# Patient Record
Sex: FEMALE | Race: WHITE | NOT HISPANIC OR LATINO | Employment: UNEMPLOYED | URBAN - METROPOLITAN AREA
[De-identification: names, ages, dates, MRNs, and addresses within clinical notes are randomized per-mention and may not be internally consistent; named-entity substitution may affect disease eponyms.]

---

## 2017-03-13 RX ORDER — SIMVASTATIN 40 MG
TABLET ORAL
Qty: 90 TABLET | Refills: 2 | Status: SHIPPED | OUTPATIENT
Start: 2017-03-13 | End: 2017-10-16 | Stop reason: ALTCHOICE

## 2017-06-30 DIAGNOSIS — E78.5 HYPERLIPIDEMIA: ICD-10-CM

## 2017-07-05 ENCOUNTER — OFFICE VISIT (OUTPATIENT)
Dept: CARDIOLOGY | Facility: CLINIC | Age: 72
End: 2017-07-05

## 2017-07-05 VITALS
HEIGHT: 62 IN | HEART RATE: 80 BPM | SYSTOLIC BLOOD PRESSURE: 122 MMHG | BODY MASS INDEX: 24.18 KG/M2 | DIASTOLIC BLOOD PRESSURE: 56 MMHG | WEIGHT: 131.4 LBS

## 2017-07-05 DIAGNOSIS — I25.10 CORONARY ARTERY DISEASE INVOLVING NATIVE CORONARY ARTERY OF NATIVE HEART WITHOUT ANGINA PECTORIS: Primary | ICD-10-CM

## 2017-07-05 DIAGNOSIS — E78.5 DYSLIPIDEMIA: ICD-10-CM

## 2017-07-05 DIAGNOSIS — E78.00 HYPERCHOLESTEREMIA: Primary | ICD-10-CM

## 2017-07-05 PROCEDURE — 99213 OFFICE O/P EST LOW 20 MIN: CPT | Performed by: INTERNAL MEDICINE

## 2017-07-05 RX ORDER — ROSUVASTATIN CALCIUM 20 MG/1
20 TABLET, COATED ORAL DAILY
Qty: 90 TABLET | Refills: 0 | Status: SHIPPED | OUTPATIENT
Start: 2017-07-05 | End: 2017-10-15 | Stop reason: SDUPTHER

## 2017-07-05 RX ORDER — DEXLANSOPRAZOLE 60 MG/1
60 CAPSULE, DELAYED RELEASE ORAL DAILY
COMMUNITY
End: 2018-07-11

## 2017-07-05 NOTE — PROGRESS NOTES
Earline Santacruz  1945  [phone]      07/05/2017    John Randhawa MD    Chief Complaint   Patient presents with   • Coronary Artery Disease       PROBLEM LIST:  1. Coronary Artery Disease:  a. Emergency room presentation in [facility] for chest pain. Admitted (04/13/2015) through (04/14/2015) with normal EKG and normal troponin.  b. Cardiac catheterization, (04/21/2015), Jodie Kenyon MD, revealing significant coronary artery disease involving the second diagonal branch, which was a 0.5 mm branch in diameter and too small for intervention.   c. Mildly elevated LVEDP and evidence of diastolic dysfunction by echocardiogram.   d. Initiation of Imdur 30 mg daily as well as aspirin 81 mg daily.   2. Fatigue and muscle weakness, recent onset.  3. Family history of early cardiac death.   4. Dyslipidemia.   5. Type 2 Diabetes Mellitus.  6. GERD.   7. Hypothyroidism.   8. Chronic pain.  9. Panic attacks.   10. Pemphigoid.   11. Mild obstructive sleep apnea; does not utilize CPAP.  12. Surgical history:  a. Cataract surgery bilaterally.  b. TODD.   c. Right lumpectomy.  d. Left hand surgery.   e. Appendectomy.       Allergies   Allergen Reactions   • Crestor [Rosuvastatin]      Muscle Cramps   • Sulfa Antibiotics      Headaches       Current Medications:    Current Outpatient Prescriptions:   •  aspirin 81 MG EC tablet, Take 81 mg by mouth daily ., Disp: , Rfl:   •  coenzyme Q10 100 MG capsule, Take 100 mg by mouth daily ., Disp: , Rfl:   •  dexlansoprazole (DEXILANT) 60 MG capsule, Take 60 mg by mouth Daily., Disp: , Rfl:   •  folic acid (FOLVITE) 1 MG tablet, Take 1 mg by mouth daily . 2 po daily, Disp: , Rfl:   •  levothyroxine (SYNTHROID, LEVOTHROID) 50 MCG tablet, Take 50 mcg by mouth daily ., Disp: , Rfl:   •  losartan (COZAAR) 25 MG tablet, Take 25 mg by mouth daily . 1/2 po daily, Disp: , Rfl:   •  metFORMIN (GLUCOPHAGE) 1000 MG tablet, Take 2,000 mg by mouth daily ., Disp: , Rfl:   •   "PARoxetine (PAXIL) 20 MG tablet, Take 20 mg by mouth every morning ., Disp: , Rfl:   •  simvastatin (ZOCOR) 40 MG tablet, TAKE 1 TABLET EVERY DAY, Disp: 90 tablet, Rfl: 2    History of Present Illness (HPI):     Earline Santacruz returns today for a follow-up of CAD. Since last visit, the patient has been experiencing some 5-minute long chest pains at night, when she is resting. However, she does not experience chest pain when she exerts herself. She also mentions having a cough and white phlegm, only when she wakes up in the morning. Additionally, she has a slight pain in her neck, most likely from the cough or sinuses. Overall, the patient is doing well from a cardiac standpoint. She denies any complaints of pressure, tightness, or unusual dyspnea. Aside from moving things in her house, she has not been very physically active because she quickly becomes fatigued.    The following portions of the patient's history were reviewed and updated as appropriate: allergies, current medications and problem list.    Pertinent positives as listed in the HPI.  All other systems reviewed are negative.    Vitals:    17 1527   BP: 122/56   BP Location: Right arm   Patient Position: Sitting   Pulse: 80   Weight: 131 lb 6.4 oz (59.6 kg)   Height: 62\" (157.5 cm)       General: Alert, awake, and oriented.  Neck: Jugular venous pressure is within normal limits. Carotids have normal upstrokes without bruits.   Cardiovascular: Heart has a nondisplaced focal PMI. Regular rate and rhythm without murmur, gallop or rub.  Lungs: Clear without rales or wheezes. Equal expansion is noted.   Extremities: Show no edema.  Skin: Warm and dry.  Neurologic: Non-focal.    Diagnostic Data:  Procedures    Lipid panel (2017)  Tri  Chol: 255  HDL: 59   LDL: 168    Assessment:    ICD-10-CM ICD-9-CM   1. Coronary artery disease involving native coronary artery of native heart without angina pectoris I25.10 414.01   2. Dyslipidemia E78.5 " 272.4       Plan:  1. Crestor 20 mg by mouth daily.  The goal LDL is less than 70  2. Repatha if the Crestor is ineffective  3. Encouraged to increase physical activity.  4. Follow-up in 1 year or sooner, if needed.      Scribed for Jodie Kenyon MD by Nataliya De La Rosa. 7/5/2017  3:52 PM    I Jodie Kenyon MD personally performed the services described in this documentation as scribed by the above individual in my presence, and it is both accurate and complete.    Jodie Kenyon MD, FACC]

## 2017-09-13 DIAGNOSIS — E78.00 HYPERCHOLESTEREMIA: ICD-10-CM

## 2017-10-16 RX ORDER — ROSUVASTATIN CALCIUM 20 MG/1
TABLET, COATED ORAL
Qty: 90 TABLET | Refills: 3 | Status: SHIPPED | OUTPATIENT
Start: 2017-10-16 | End: 2018-11-13 | Stop reason: SDUPTHER

## 2018-07-11 ENCOUNTER — OFFICE VISIT (OUTPATIENT)
Dept: CARDIOLOGY | Facility: CLINIC | Age: 73
End: 2018-07-11

## 2018-07-11 VITALS
HEART RATE: 60 BPM | BODY MASS INDEX: 24.69 KG/M2 | WEIGHT: 134.2 LBS | HEIGHT: 62 IN | DIASTOLIC BLOOD PRESSURE: 62 MMHG | SYSTOLIC BLOOD PRESSURE: 104 MMHG

## 2018-07-11 DIAGNOSIS — E78.5 DYSLIPIDEMIA: ICD-10-CM

## 2018-07-11 DIAGNOSIS — R00.2 PALPITATIONS: ICD-10-CM

## 2018-07-11 DIAGNOSIS — I25.10 CORONARY ARTERY DISEASE INVOLVING NATIVE CORONARY ARTERY OF NATIVE HEART WITHOUT ANGINA PECTORIS: Primary | ICD-10-CM

## 2018-07-11 DIAGNOSIS — I63.429 CEREBROVASCULAR ACCIDENT (CVA) DUE TO EMBOLISM OF ANTERIOR CEREBRAL ARTERY, UNSPECIFIED BLOOD VESSEL LATERALITY (HCC): ICD-10-CM

## 2018-07-11 DIAGNOSIS — R09.89 BRUIT: ICD-10-CM

## 2018-07-11 PROCEDURE — 99213 OFFICE O/P EST LOW 20 MIN: CPT | Performed by: INTERNAL MEDICINE

## 2018-07-11 RX ORDER — PANTOPRAZOLE SODIUM 40 MG/1
40 TABLET, DELAYED RELEASE ORAL DAILY
COMMUNITY
End: 2022-10-31

## 2018-07-11 RX ORDER — ALPRAZOLAM 0.5 MG/1
0.5 TABLET ORAL NIGHTLY PRN
COMMUNITY
End: 2022-09-30 | Stop reason: SDUPTHER

## 2018-07-11 NOTE — PROGRESS NOTES
Earline Santacruz  1945  [phone]      07/11/2018    John Randhawa MD    Chief Complaint   Patient presents with   • Coronary Artery Disease       Problem List:  1. Coronary Artery Disease:  a. Emergency room presentation in [facility] for chest pain. Admitted (04/13/2015) through (04/14/2015) with normal EKG and normal troponin.  b. Cardiac catheterization, (04/21/2015), Jodie Kenyon MD, revealing significant coronary artery disease involving the second diagonal branch, which was a 0.5 mm branch in diameter and too small for intervention.   c. Mildly elevated LVEDP and evidence of diastolic dysfunction by echocardiogram.   d. Initiation of Imdur 30 mg daily as well as aspirin 81 mg daily.   2. Fatigue and muscle weakness, recent onset.  3. Family history of early cardiac death.   4. Dyslipidemia.   5. Type 2 Diabetes Mellitus.  6. GERD.   7. Hypothyroidism.   8. Chronic pain.  9. Panic attacks.   10. Pemphigoid.   11. Mild obstructive sleep apnea; does not utilize CPAP.  12. Surgical history:  a. Cataract surgery bilaterally.  b. TODD.   c. Right lumpectomy.  d. Left hand surgery.   e. Appendectomy.     Allergies   Allergen Reactions   • Crestor [Rosuvastatin]      Muscle Cramps   • Sulfa Antibiotics      Headaches       Current Medications:      Current Outpatient Prescriptions:   •  ALPRAZolam (XANAX) 0.5 MG tablet, Take 0.5 mg by mouth At Night As Needed for Anxiety., Disp: , Rfl:   •  aspirin 81 MG EC tablet, Take 81 mg by mouth daily ., Disp: , Rfl:   •  coenzyme Q10 100 MG capsule, Take 100 mg by mouth daily ., Disp: , Rfl:   •  folic acid (FOLVITE) 1 MG tablet, Take 1 mg by mouth daily . 2 po daily, Disp: , Rfl:   •  levothyroxine (SYNTHROID, LEVOTHROID) 50 MCG tablet, Take 50 mcg by mouth daily ., Disp: , Rfl:   •  losartan (COZAAR) 25 MG tablet, Take 25 mg by mouth Daily., Disp: , Rfl:   •  metFORMIN (GLUCOPHAGE) 1000 MG tablet, Take 2,000 mg by mouth daily ., Disp: , Rfl:   •   "pantoprazole (PROTONIX) 40 MG EC tablet, Take 40 mg by mouth Daily., Disp: , Rfl:   •  PARoxetine (PAXIL) 20 MG tablet, Take 20 mg by mouth every morning ., Disp: , Rfl:   •  rosuvastatin (CRESTOR) 20 MG tablet, TAKE 1 TABLET BY MOUTH EVERY DAY, Disp: 90 tablet, Rfl: 3    HPI    Earline Santacruz presents today for 12 month follow up of CAD and dyslipidemia. Since last visit, patient has been doing well from a cardiac standpoint, but was found to have had a stroke in the past. She states that it may have occurred 5 years ago after her mother passed away. On the day of the stroke she states she was at a yard sale, when she suddenly had difficulty with her speech. Reports that she has lópez under significant stress and anxiety due to family issues. States her ankles swell mildly at night. Patient denies chest pain, palpitations, shortness of breath, edema, PND, orthopnea, dizziness, and syncope. Remains busy and active with walking with no limitations.    The following portions of the patient's history were reviewed and updated as appropriate: allergies, current medications and problem list.    Pertinent positives as listed in the HPI.  All other systems reviewed are negative.    Vitals:    07/11/18 1507   BP: 104/62   BP Location: Right arm   Patient Position: Sitting   Pulse: 60   Weight: 60.9 kg (134 lb 3.2 oz)   Height: 157.5 cm (62\")       Physical Exam:  General: Alert and oriented to person, place, and time.  Neck: Jugular venous pressure is within normal limits. Carotids have normal upstrokes with Soft right bruit.   Cardiovascular: Regular rate and rhythm without murmur gallop or rub.  Lungs: Clear without rales or wheezes. Equal expansion is noted.   Extremities: Show no edema.  Skin: warm and dry.  Neurologic: nonfocal    Diagnostic Data:      Procedures    Assessment:      ICD-10-CM ICD-9-CM   1. Coronary artery disease involving native coronary artery of native heart without angina pectoris I25.10 414.01 "   2. Dyslipidemia E78.5 272.4   3. 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 Palpitations  R00.2 785.1       Plan:    1. Schedule carotid US and a 30 day monitor.  2. Continue current medications.  3. F/up in 5-6 weeksor sooner if needed.    Scribed for Jodie Kenyon MD by Santana Adam. 7/11/2018  3:38 PM     I Jodie Kenyon MD personally performed the services described in this documentation as scribed by the above individual in my presence, and it is both accurate and complete.    Jodie Kenyon MD, FACC

## 2018-08-14 ENCOUNTER — TELEPHONE (OUTPATIENT)
Dept: CARDIOLOGY | Facility: CLINIC | Age: 73
End: 2018-08-14

## 2018-08-14 NOTE — TELEPHONE ENCOUNTER
Notified pt via phone that her monitor did not show any afib- keep carotid and appt on 8/22- she verbalized understanding-

## 2018-08-22 ENCOUNTER — OFFICE VISIT (OUTPATIENT)
Dept: CARDIOLOGY | Facility: CLINIC | Age: 73
End: 2018-08-22

## 2018-08-22 ENCOUNTER — HOSPITAL ENCOUNTER (OUTPATIENT)
Dept: CARDIOLOGY | Facility: HOSPITAL | Age: 73
Discharge: HOME | End: 2018-08-22
Attending: INTERNAL MEDICINE | Admitting: INTERNAL MEDICINE

## 2018-08-22 VITALS
HEIGHT: 62 IN | BODY MASS INDEX: 23.81 KG/M2 | WEIGHT: 129.4 LBS | SYSTOLIC BLOOD PRESSURE: 114 MMHG | HEART RATE: 62 BPM | DIASTOLIC BLOOD PRESSURE: 54 MMHG

## 2018-08-22 DIAGNOSIS — R09.89 BRUIT: ICD-10-CM

## 2018-08-22 DIAGNOSIS — E78.5 DYSLIPIDEMIA: ICD-10-CM

## 2018-08-22 DIAGNOSIS — I25.10 CORONARY ARTERY DISEASE INVOLVING NATIVE CORONARY ARTERY OF NATIVE HEART WITHOUT ANGINA PECTORIS: Primary | ICD-10-CM

## 2018-08-22 LAB
BH CV XLRA MEAS LEFT CCA RATIO VEL: 110 CM/SEC
BH CV XLRA MEAS LEFT DIST CCA EDV: 21.6 CM/SEC
BH CV XLRA MEAS LEFT DIST CCA PSV: 111 CM/SEC
BH CV XLRA MEAS LEFT DIST ICA EDV: 29.1 CM/SEC
BH CV XLRA MEAS LEFT DIST ICA PSV: 102.1 CM/SEC
BH CV XLRA MEAS LEFT ICA RATIO VEL: 110 CM/SEC
BH CV XLRA MEAS LEFT ICA/CCA RATIO: 1
BH CV XLRA MEAS LEFT MID CCA EDV: 20.6 CM/SEC
BH CV XLRA MEAS LEFT MID CCA PSV: 106.1 CM/SEC
BH CV XLRA MEAS LEFT MID ICA EDV: 27.5 CM/SEC
BH CV XLRA MEAS LEFT MID ICA PSV: 110.8 CM/SEC
BH CV XLRA MEAS LEFT PROX CCA EDV: 23.6 CM/SEC
BH CV XLRA MEAS LEFT PROX CCA PSV: 117.9 CM/SEC
BH CV XLRA MEAS LEFT PROX ECA PSV: 90.4 CM/SEC
BH CV XLRA MEAS LEFT PROX ICA EDV: 29.9 CM/SEC
BH CV XLRA MEAS LEFT PROX ICA PSV: 104.5 CM/SEC
BH CV XLRA MEAS LEFT PROX SCLA PSV: 139.5 CM/SEC
BH CV XLRA MEAS LEFT VERTEBRAL A PSV: 47.9 CM/SEC
BH CV XLRA MEAS RIGHT CCA RATIO VEL: 83.1 CM/SEC
BH CV XLRA MEAS RIGHT DIST CCA EDV: 16.1 CM/SEC
BH CV XLRA MEAS RIGHT DIST CCA PSV: 83.8 CM/SEC
BH CV XLRA MEAS RIGHT DIST ICA EDV: 32.2 CM/SEC
BH CV XLRA MEAS RIGHT DIST ICA PSV: 106.9 CM/SEC
BH CV XLRA MEAS RIGHT ICA RATIO VEL: 98.5 CM/SEC
BH CV XLRA MEAS RIGHT ICA/CCA RATIO: 1.2
BH CV XLRA MEAS RIGHT MID CCA EDV: 17.5 CM/SEC
BH CV XLRA MEAS RIGHT MID CCA PSV: 93.6 CM/SEC
BH CV XLRA MEAS RIGHT MID ICA EDV: 30 CM/SEC
BH CV XLRA MEAS RIGHT MID ICA PSV: 99.2 CM/SEC
BH CV XLRA MEAS RIGHT PROX CCA EDV: 19.6 CM/SEC
BH CV XLRA MEAS RIGHT PROX CCA PSV: 87.4 CM/SEC
BH CV XLRA MEAS RIGHT PROX ECA PSV: 74.7 CM/SEC
BH CV XLRA MEAS RIGHT PROX ICA EDV: 21 CM/SEC
BH CV XLRA MEAS RIGHT PROX ICA PSV: 81.7 CM/SEC
BH CV XLRA MEAS RIGHT PROX SCLA PSV: 165 CM/SEC
BH CV XLRA MEAS RIGHT VERTEBRAL A PSV: 33.8 CM/SEC
LEFT ARM BP: NORMAL MMHG
RIGHT ARM BP: NORMAL MMHG

## 2018-08-22 PROCEDURE — 93880 EXTRACRANIAL BILAT STUDY: CPT

## 2018-08-22 PROCEDURE — 99213 OFFICE O/P EST LOW 20 MIN: CPT | Performed by: INTERNAL MEDICINE

## 2018-08-22 PROCEDURE — 93880 EXTRACRANIAL BILAT STUDY: CPT | Performed by: INTERNAL MEDICINE

## 2018-08-22 NOTE — PROGRESS NOTES
Earline Santacruz  1945  [phone]      08/22/2018    John Randhawa MD    Chief Complaint   Patient presents with   • Coronary Artery Disease       Problem List:  1. Coronary Artery Disease:  a. Emergency room presentation in [facility] for chest pain. Admitted (04/13/2015) through (04/14/2015) with normal EKG and normal troponin.  b. Cardiac catheterization, (04/21/2015), Jodie Kenyon MD, revealing significant coronary artery disease involving the second diagonal branch, which was a 0.5 mm branch in diameter and too small for intervention.   c. Mildly elevated LVEDP and evidence of diastolic dysfunction by echocardiogram.   d. Initiation of Imdur 30 mg daily as well as aspirin 81 mg daily.   2. Prior CVA  a. Holter monitor 8/9/2018: SR/ST/PAC  b. Caroid duplex 8/22/2018: Right internal carotid artery stenosis of 0-49%. Left internal carotid artery stenosis of 0-49%. Minimal carotid atheroma bilaterally.  3. Fatigue and muscle weakness, recent onset.  4. Family history of early cardiac death.   5. Dyslipidemia.   6. Type 2 Diabetes Mellitus.  7. GERD.   8. Hypothyroidism.   9. Chronic pain.  10. Panic attacks.   11. Pemphigoid.   12. Mild obstructive sleep apnea; does not utilize CPAP.  13. Surgical history:  a. Cataract surgery bilaterally.  b. TODD.   c. Right lumpectomy.  d. Left hand surgery.   e. Appendectomy.     Allergies   Allergen Reactions   • Crestor [Rosuvastatin]      Muscle Cramps   • Sulfa Antibiotics      Headaches       Current Medications:      Current Outpatient Prescriptions:   •  ALPRAZolam (XANAX) 0.5 MG tablet, Take 0.5 mg by mouth At Night As Needed for Anxiety., Disp: , Rfl:   •  aspirin 81 MG EC tablet, Take 81 mg by mouth daily ., Disp: , Rfl:   •  coenzyme Q10 100 MG capsule, Take 100 mg by mouth daily ., Disp: , Rfl:   •  folic acid (FOLVITE) 1 MG tablet, Take 1 mg by mouth daily . 2 po daily, Disp: , Rfl:   •  levothyroxine (SYNTHROID, LEVOTHROID) 50 MCG tablet,  "Take 50 mcg by mouth daily ., Disp: , Rfl:   •  losartan (COZAAR) 25 MG tablet, Take 25 mg by mouth Daily., Disp: , Rfl:   •  metFORMIN (GLUCOPHAGE) 500 MG tablet, Take 2,000 mg by mouth daily ., Disp: , Rfl:   •  pantoprazole (PROTONIX) 40 MG EC tablet, Take 40 mg by mouth Daily., Disp: , Rfl:   •  rosuvastatin (CRESTOR) 20 MG tablet, TAKE 1 TABLET BY MOUTH EVERY DAY, Disp: 90 tablet, Rfl: 3    HPI    Earline Santacruz presents today for 4 week follow up of CAD and dyslipidemia. Since last visit, patient has been doing well from a cardia standpoint. Per most recent carotid duplex, her carotid artery flow is normal, with minimal blockage. She had a recent 30 day heart monitor which showed sinus rhythm with no episodes of atrial fibrillation. She recently was bitten by her dog, but has had no complications. Patient denies chest pain, palpitations, shortness of breath, edema, PND, orthopnea, dizziness, and syncope. Remains busy and active with no limitations, but does not exercise regularly.    The following portions of the patient's history were reviewed and updated as appropriate: allergies, current medications and problem list.    Pertinent positives as listed in the HPI.  All other systems reviewed are negative.    Vitals:    08/22/18 1114   BP: 114/54   BP Location: Right arm   Patient Position: Sitting   Pulse: 62   Weight: 58.7 kg (129 lb 6.4 oz)   Height: 157.5 cm (62\")       Physical Exam:  General: Alert and oriented to person, place, and time.  Neck: Jugular venous pressure is within normal limits. Carotids have normal upstrokes without bruits.   Cardiovascular: Regular rate and rhythm without murmur gallop or rub.  Lungs: Clear without rales or wheezes. Equal expansion is noted.   Extremities: Show no edema.  Skin: warm and dry.  Neurologic: nonfocal    Diagnostic Data:    Lipid panel 5/16/2018:   TG 97    HDL 68  LDL 29    Procedures    Assessment:      ICD-10-CM ICD-9-CM   1. Coronary artery " disease involving native coronary artery of native heart without angina pectoris I25.10 414.01   2. Dyslipidemia E78.5 272.4       Plan:    1. No transesophageal echo at this time for her prior CVA.  There was no evidence of A. fib on her monitor.  She does not believe she was taking aspirin daily at the time of her CVA approximately 5 years ago.    2. Begin regular exercise for at least 30 minutes 4-5 days a week.  3. If CVA symptoms reoccur, then reevaluate the possibility of BERTIN..  4. Continue current medications.  5. F/up in 1 year or sooner if needed.    Scribed for Jodie Kenyon MD by Santana Adam. 8/22/2018  11:26 AM     I Jodie Kenyon MD personally performed the services described in this documentation as scribed by the above individual in my presence, and it is both accurate and complete.    Jodie Kenyon MD, [facility]

## 2018-11-13 RX ORDER — ROSUVASTATIN CALCIUM 20 MG/1
TABLET, COATED ORAL
Qty: 90 TABLET | Refills: 1 | Status: SHIPPED | OUTPATIENT
Start: 2018-11-13 | End: 2019-07-01 | Stop reason: SDUPTHER

## 2019-07-01 RX ORDER — ROSUVASTATIN CALCIUM 20 MG/1
20 TABLET, COATED ORAL DAILY
Qty: 90 TABLET | Refills: 0 | Status: SHIPPED | OUTPATIENT
Start: 2019-07-01 | End: 2023-01-09 | Stop reason: SDUPTHER

## 2019-10-23 ENCOUNTER — OFFICE VISIT (OUTPATIENT)
Dept: CARDIOLOGY | Facility: CLINIC | Age: 74
End: 2019-10-23

## 2019-10-23 VITALS
WEIGHT: 127 LBS | OXYGEN SATURATION: 97 % | BODY MASS INDEX: 23.37 KG/M2 | SYSTOLIC BLOOD PRESSURE: 106 MMHG | HEART RATE: 74 BPM | HEIGHT: 62 IN | DIASTOLIC BLOOD PRESSURE: 60 MMHG

## 2019-10-23 DIAGNOSIS — I25.10 CORONARY ARTERY DISEASE INVOLVING NATIVE CORONARY ARTERY OF NATIVE HEART WITHOUT ANGINA PECTORIS: Primary | ICD-10-CM

## 2019-10-23 DIAGNOSIS — I10 ESSENTIAL HYPERTENSION: ICD-10-CM

## 2019-10-23 DIAGNOSIS — E78.5 DYSLIPIDEMIA: ICD-10-CM

## 2019-10-23 PROCEDURE — 99214 OFFICE O/P EST MOD 30 MIN: CPT | Performed by: INTERNAL MEDICINE

## 2019-10-23 NOTE — PROGRESS NOTES
Earline Santacruz  1945  [phone]    Encounter date: 10/23/2019    PCP: John Randhawa MD    Chief Complaint: Coronary artery disease, dyslipidemia    PROBLEM LIST:  1. Coronary Artery Disease:  a. Emergency room presentation in [facility] for chest pain. Admitted (04/13/2015) through (04/14/2015) with normal EKG and normal troponin.  b. Cardiac catheterization, (04/21/2015), Jodie Kenyon MD, revealing significant coronary artery disease involving the second diagonal branch, which was a 0.5 mm branch in diameter and too small for intervention.   c. Mildly elevated LVEDP and evidence of diastolic dysfunction by echocardiogram.   d. Initiation of Imdur 30 mg daily as well as aspirin 81 mg daily.   2. Prior CVA  a. Holter monitor 8/9/2018: SR/ST/PAC  b. Caroid duplex 8/22/2018: Right internal carotid artery stenosis of 0-49%. Left internal carotid artery stenosis of 0-49%. Minimal carotid atheroma bilaterally.  3. Fatigue and muscle weakness, recent onset.  4. Family history of early cardiac death.   5. Dyslipidemia.   6. Type 2 Diabetes Mellitus.  7. GERD.   8. Hypothyroidism.   9. Chronic pain.  10. Panic attacks.   11. Pemphigoid.   12. Mild obstructive sleep apnea; does not utilize CPAP.  13. Surgical history:  a. Cataract surgery bilaterally.  b. TODD.   c. Right lumpectomy.  d. Left hand surgery.       Allergies   Allergen Reactions   • Crestor [Rosuvastatin]      Muscle Cramps   • Sulfa Antibiotics      Headaches       Current Medications:    Current Outpatient Medications:   •  ALPRAZolam (XANAX) 0.5 MG tablet, Take 0.5 mg by mouth At Night As Needed for Anxiety., Disp: , Rfl:   •  aspirin 81 MG EC tablet, Take 81 mg by mouth daily ., Disp: , Rfl:   •  coenzyme Q10 100 MG capsule, Take 100 mg by mouth daily ., Disp: , Rfl:   •  folic acid (FOLVITE) 1 MG tablet, Take 1 mg by mouth daily . 2 po daily, Disp: , Rfl:   •  levothyroxine (SYNTHROID, LEVOTHROID) 50 MCG tablet, Take 50 mcg by mouth  "daily ., Disp: , Rfl:   •  losartan (COZAAR) 25 MG tablet, Take 12.5 mg by mouth Daily., Disp: , Rfl:   •  metFORMIN (GLUCOPHAGE) 500 MG tablet, Take 2,000 mg by mouth daily ., Disp: , Rfl:   •  pantoprazole (PROTONIX) 40 MG EC tablet, Take 40 mg by mouth Daily., Disp: , Rfl:   •  rosuvastatin (CRESTOR) 20 MG tablet, Take 1 tablet by mouth Daily. Need lab work, Disp: 90 tablet, Rfl: 0    HPI  Patient returns today for follow up with a history of CAD and dyslipdemia. Since last visit in August 2019, she has been feeling well overall from a cardiac standpoint. She remains physically active with walking, though she admits that her physical activity has greatly decreased recently. She walked 4-5 miles daily in the Spring, but has recently only been walking about 2 miles weekly. This is due mostly to feeling fatigued and \"not wanting to do anything\". She reports blood pressures in the low-100s systolic and wonders whether this could cause her fatigue. Patient denies chest pain, palpitations, shortness of breath, edema, and syncope. Pt reports a recent flu shot.    The following portions of the patient's history were reviewed and updated as appropriate: allergies, current medications and problem list.    Pertinent positives as listed in the HPI.  All other systems reviewed are negative.    Vitals:    10/23/19 1119   BP: 106/60   BP Location: Right arm   Patient Position: Sitting   Pulse: 74   SpO2: 97%   Weight: 57.6 kg (127 lb)   Height: 156.2 cm (61.5\")       General: Alert, awake, and oriented  Neck: Jugular venous pressure is within normal limits. Carotids have normal upstrokes without bruits.   Cardiovascular: Heart has a nondisplaced focal PMI. Regular rate and rhythm without murmur, gallop or rub.  Lungs: Clear without rales or wheezes. Equal expansion is noted.   Extremities: Shows no edema.  Skin: Warm and dry.  Neurologic: nonfocal    Diagnostic Data:    Last labs, 05/28/2019:  · FLP: , TG 99, HDL 72, LDL " 75  · CMP: Glucose 152, otherwise normal CMP  · Normal CBC  · TSH 1.05    Procedures    Assessment:    ICD-10-CM ICD-9-CM   1. Coronary artery disease involving native coronary artery of native heart without angina pectoris I25.10 414.01   2. Essential hypertension I10 401.9   3. Dyslipidemia E78.5 272.4       Plan:    1. Begin routine aerobic exercise at least 30 minutes, 4-5 days per week.   2. DC losartan due to low-normal blood pressure with possible associated fatigue.  If her fatigue does not improve then she should reinitiate the losartan at 12.5 mg daily.  3. Continue aspirin 81 mg for CAD.  4. Continue rosuvastatin 20 mg for hyperlipidemia.  5. Continue current medications.  6. F/up in 12 months or sooner if needed.      Scribed for Jodie Kenyon MD by Yani Ramos. 10/23/2019  11:27 AM     I Jodie Kenyon MD personally performed the services described in this documentation as scribed by the above individual in my presence, and it is both accurate and complete.    Jodie Kenyon MD, FACC

## 2019-11-17 ENCOUNTER — HOSPITAL ENCOUNTER (OUTPATIENT)
Facility: HOSPITAL | Age: 74
Setting detail: OBSERVATION
Discharge: HOME | End: 2019-11-18
Attending: EMERGENCY MEDICINE | Admitting: INTERNAL MEDICINE

## 2019-11-17 DIAGNOSIS — R51.9 INTRACTABLE HEADACHE, UNSPECIFIED CHRONICITY PATTERN, UNSPECIFIED HEADACHE TYPE: Primary | ICD-10-CM

## 2019-11-17 DIAGNOSIS — Z74.09 IMPAIRED MOBILITY AND ADLS: ICD-10-CM

## 2019-11-17 DIAGNOSIS — Z78.9 IMPAIRED MOBILITY AND ADLS: ICD-10-CM

## 2019-11-17 PROBLEM — B02.9 ZOSTER: Status: ACTIVE | Noted: 2019-11-17

## 2019-11-17 LAB
ALBUMIN SERPL-MCNC: 4 G/DL (ref 3.5–5.2)
ALBUMIN/GLOB SERPL: 1.7 G/DL
ALP SERPL-CCNC: 51 U/L (ref 39–117)
ALT SERPL W P-5'-P-CCNC: 12 U/L (ref 1–33)
ANION GAP SERPL CALCULATED.3IONS-SCNC: 10 MMOL/L (ref 5–15)
AST SERPL-CCNC: 14 U/L (ref 1–32)
BASOPHILS # BLD AUTO: 0.05 10*3/MM3 (ref 0–0.2)
BASOPHILS NFR BLD AUTO: 0.5 % (ref 0–1.5)
BILIRUB SERPL-MCNC: 0.4 MG/DL (ref 0.2–1.2)
BUN BLD-MCNC: 15 MG/DL (ref 8–23)
BUN/CREAT SERPL: 23.4 (ref 7–25)
CALCIUM SPEC-SCNC: 9 MG/DL (ref 8.6–10.5)
CHLORIDE SERPL-SCNC: 101 MMOL/L (ref 98–107)
CO2 SERPL-SCNC: 29 MMOL/L (ref 22–29)
CREAT BLD-MCNC: 0.64 MG/DL (ref 0.57–1)
DEPRECATED RDW RBC AUTO: 42.2 FL (ref 37–54)
EOSINOPHIL # BLD AUTO: 0.21 10*3/MM3 (ref 0–0.4)
EOSINOPHIL NFR BLD AUTO: 2.1 % (ref 0.3–6.2)
ERYTHROCYTE [DISTWIDTH] IN BLOOD BY AUTOMATED COUNT: 12.3 % (ref 12.3–15.4)
ERYTHROCYTE [SEDIMENTATION RATE] IN BLOOD: 2 MM/HR (ref 0–30)
GFR SERPL CREATININE-BSD FRML MDRD: 91 ML/MIN/1.73
GLOBULIN UR ELPH-MCNC: 2.4 GM/DL
GLUCOSE BLD-MCNC: 155 MG/DL (ref 65–99)
HCT VFR BLD AUTO: 39.8 % (ref 34–46.6)
HGB BLD-MCNC: 13.6 G/DL (ref 12–15.9)
IMM GRANULOCYTES # BLD AUTO: 0.02 10*3/MM3 (ref 0–0.05)
IMM GRANULOCYTES NFR BLD AUTO: 0.2 % (ref 0–0.5)
LYMPHOCYTES # BLD AUTO: 2.87 10*3/MM3 (ref 0.7–3.1)
LYMPHOCYTES NFR BLD AUTO: 29 % (ref 19.6–45.3)
MCH RBC QN AUTO: 32.1 PG (ref 26.6–33)
MCHC RBC AUTO-ENTMCNC: 34.2 G/DL (ref 31.5–35.7)
MCV RBC AUTO: 93.9 FL (ref 79–97)
MONOCYTES # BLD AUTO: 0.64 10*3/MM3 (ref 0.1–0.9)
MONOCYTES NFR BLD AUTO: 6.5 % (ref 5–12)
NEUTROPHILS # BLD AUTO: 6.11 10*3/MM3 (ref 1.7–7)
NEUTROPHILS NFR BLD AUTO: 61.7 % (ref 42.7–76)
NRBC BLD AUTO-RTO: 0 /100 WBC (ref 0–0.2)
PLATELET # BLD AUTO: 294 10*3/MM3 (ref 140–450)
PMV BLD AUTO: 9.3 FL (ref 6–12)
POTASSIUM BLD-SCNC: 4.1 MMOL/L (ref 3.5–5.2)
PROT SERPL-MCNC: 6.4 G/DL (ref 6–8.5)
RBC # BLD AUTO: 4.24 10*6/MM3 (ref 3.77–5.28)
SODIUM BLD-SCNC: 140 MMOL/L (ref 136–145)
WBC NRBC COR # BLD: 9.9 10*3/MM3 (ref 3.4–10.8)

## 2019-11-17 PROCEDURE — 25010000002 DIPHENHYDRAMINE PER 50 MG: Performed by: PHYSICIAN ASSISTANT

## 2019-11-17 PROCEDURE — 96375 TX/PRO/DX INJ NEW DRUG ADDON: CPT

## 2019-11-17 PROCEDURE — 99285 EMERGENCY DEPT VISIT HI MDM: CPT

## 2019-11-17 PROCEDURE — 85652 RBC SED RATE AUTOMATED: CPT | Performed by: INTERNAL MEDICINE

## 2019-11-17 PROCEDURE — 85025 COMPLETE CBC W/AUTO DIFF WBC: CPT | Performed by: PHYSICIAN ASSISTANT

## 2019-11-17 PROCEDURE — 25010000002 KETOROLAC TROMETHAMINE PER 15 MG: Performed by: EMERGENCY MEDICINE

## 2019-11-17 PROCEDURE — 96376 TX/PRO/DX INJ SAME DRUG ADON: CPT

## 2019-11-17 PROCEDURE — 93005 ELECTROCARDIOGRAM TRACING: CPT | Performed by: INTERNAL MEDICINE

## 2019-11-17 PROCEDURE — G0378 HOSPITAL OBSERVATION PER HR: HCPCS

## 2019-11-17 PROCEDURE — 96374 THER/PROPH/DIAG INJ IV PUSH: CPT

## 2019-11-17 PROCEDURE — 80053 COMPREHEN METABOLIC PANEL: CPT | Performed by: PHYSICIAN ASSISTANT

## 2019-11-17 PROCEDURE — 25010000002 METOCLOPRAMIDE PER 10 MG: Performed by: EMERGENCY MEDICINE

## 2019-11-17 PROCEDURE — 25010000002 DIPHENHYDRAMINE PER 50 MG: Performed by: EMERGENCY MEDICINE

## 2019-11-17 PROCEDURE — 25010000002 METOCLOPRAMIDE PER 10 MG: Performed by: PHYSICIAN ASSISTANT

## 2019-11-17 PROCEDURE — 99220 PR INITIAL OBSERVATION CARE/DAY 70 MINUTES: CPT | Performed by: INTERNAL MEDICINE

## 2019-11-17 RX ORDER — ACETAMINOPHEN 325 MG/1
650 TABLET ORAL EVERY 4 HOURS PRN
Status: DISCONTINUED | OUTPATIENT
Start: 2019-11-17 | End: 2019-11-18 | Stop reason: HOSPADM

## 2019-11-17 RX ORDER — ONDANSETRON 2 MG/ML
4 INJECTION INTRAMUSCULAR; INTRAVENOUS EVERY 6 HOURS PRN
Status: DISCONTINUED | OUTPATIENT
Start: 2019-11-17 | End: 2019-11-18 | Stop reason: HOSPADM

## 2019-11-17 RX ORDER — SODIUM CHLORIDE 0.9 % (FLUSH) 0.9 %
10 SYRINGE (ML) INJECTION AS NEEDED
Status: DISCONTINUED | OUTPATIENT
Start: 2019-11-17 | End: 2019-11-18 | Stop reason: HOSPADM

## 2019-11-17 RX ORDER — METOCLOPRAMIDE HYDROCHLORIDE 5 MG/ML
10 INJECTION INTRAMUSCULAR; INTRAVENOUS ONCE
Status: COMPLETED | OUTPATIENT
Start: 2019-11-17 | End: 2019-11-17

## 2019-11-17 RX ORDER — HYDROMORPHONE HYDROCHLORIDE 1 MG/ML
0.25 INJECTION, SOLUTION INTRAMUSCULAR; INTRAVENOUS; SUBCUTANEOUS
Status: DISCONTINUED | OUTPATIENT
Start: 2019-11-17 | End: 2019-11-18 | Stop reason: HOSPADM

## 2019-11-17 RX ORDER — METOCLOPRAMIDE HYDROCHLORIDE 5 MG/ML
5 INJECTION INTRAMUSCULAR; INTRAVENOUS ONCE
Status: COMPLETED | OUTPATIENT
Start: 2019-11-17 | End: 2019-11-17

## 2019-11-17 RX ORDER — DIPHENHYDRAMINE HYDROCHLORIDE 50 MG/ML
12.5 INJECTION INTRAMUSCULAR; INTRAVENOUS ONCE
Status: COMPLETED | OUTPATIENT
Start: 2019-11-17 | End: 2019-11-17

## 2019-11-17 RX ORDER — NICOTINE POLACRILEX 4 MG
15 LOZENGE BUCCAL
Status: DISCONTINUED | OUTPATIENT
Start: 2019-11-17 | End: 2019-11-18 | Stop reason: HOSPADM

## 2019-11-17 RX ORDER — DIPHENHYDRAMINE HYDROCHLORIDE 50 MG/ML
25 INJECTION INTRAMUSCULAR; INTRAVENOUS ONCE
Status: COMPLETED | OUTPATIENT
Start: 2019-11-17 | End: 2019-11-17

## 2019-11-17 RX ORDER — PANTOPRAZOLE SODIUM 40 MG/1
40 TABLET, DELAYED RELEASE ORAL DAILY
Status: DISCONTINUED | OUTPATIENT
Start: 2019-11-18 | End: 2019-11-18 | Stop reason: HOSPADM

## 2019-11-17 RX ORDER — GABAPENTIN 400 MG/1
400 CAPSULE ORAL EVERY 8 HOURS SCHEDULED
Status: DISCONTINUED | OUTPATIENT
Start: 2019-11-18 | End: 2019-11-18 | Stop reason: HOSPADM

## 2019-11-17 RX ORDER — DEXTROSE MONOHYDRATE 25 G/50ML
25 INJECTION, SOLUTION INTRAVENOUS
Status: DISCONTINUED | OUTPATIENT
Start: 2019-11-17 | End: 2019-11-18 | Stop reason: HOSPADM

## 2019-11-17 RX ORDER — ROSUVASTATIN CALCIUM 20 MG/1
20 TABLET, COATED ORAL DAILY
Status: DISCONTINUED | OUTPATIENT
Start: 2019-11-18 | End: 2019-11-18 | Stop reason: HOSPADM

## 2019-11-17 RX ORDER — ASPIRIN 81 MG/1
81 TABLET ORAL DAILY
Status: DISCONTINUED | OUTPATIENT
Start: 2019-11-18 | End: 2019-11-18 | Stop reason: HOSPADM

## 2019-11-17 RX ORDER — ONDANSETRON 4 MG/1
4 TABLET, FILM COATED ORAL EVERY 6 HOURS PRN
Status: DISCONTINUED | OUTPATIENT
Start: 2019-11-17 | End: 2019-11-18 | Stop reason: HOSPADM

## 2019-11-17 RX ORDER — ALPRAZOLAM 0.5 MG/1
0.5 TABLET ORAL NIGHTLY PRN
Status: DISCONTINUED | OUTPATIENT
Start: 2019-11-17 | End: 2019-11-18 | Stop reason: HOSPADM

## 2019-11-17 RX ORDER — TETRACAINE HYDROCHLORIDE 5 MG/ML
2 SOLUTION OPHTHALMIC ONCE
Status: COMPLETED | OUTPATIENT
Start: 2019-11-17 | End: 2019-11-17

## 2019-11-17 RX ORDER — KETOROLAC TROMETHAMINE 15 MG/ML
15 INJECTION, SOLUTION INTRAMUSCULAR; INTRAVENOUS ONCE
Status: COMPLETED | OUTPATIENT
Start: 2019-11-17 | End: 2019-11-17

## 2019-11-17 RX ORDER — NALOXONE HCL 0.4 MG/ML
0.4 VIAL (ML) INJECTION
Status: DISCONTINUED | OUTPATIENT
Start: 2019-11-17 | End: 2019-11-18 | Stop reason: HOSPADM

## 2019-11-17 RX ORDER — ACETAMINOPHEN 160 MG/5ML
650 SOLUTION ORAL EVERY 4 HOURS PRN
Status: DISCONTINUED | OUTPATIENT
Start: 2019-11-17 | End: 2019-11-18 | Stop reason: HOSPADM

## 2019-11-17 RX ORDER — SODIUM CHLORIDE 0.9 % (FLUSH) 0.9 %
10 SYRINGE (ML) INJECTION EVERY 12 HOURS SCHEDULED
Status: DISCONTINUED | OUTPATIENT
Start: 2019-11-18 | End: 2019-11-18 | Stop reason: HOSPADM

## 2019-11-17 RX ORDER — ACETAMINOPHEN 650 MG/1
650 SUPPOSITORY RECTAL EVERY 4 HOURS PRN
Status: DISCONTINUED | OUTPATIENT
Start: 2019-11-17 | End: 2019-11-18 | Stop reason: HOSPADM

## 2019-11-17 RX ORDER — SODIUM CHLORIDE 9 MG/ML
75 INJECTION, SOLUTION INTRAVENOUS CONTINUOUS
Status: DISCONTINUED | OUTPATIENT
Start: 2019-11-18 | End: 2019-11-18 | Stop reason: HOSPADM

## 2019-11-17 RX ORDER — VALACYCLOVIR HYDROCHLORIDE 500 MG/1
1000 TABLET, FILM COATED ORAL EVERY 8 HOURS SCHEDULED
Status: DISCONTINUED | OUTPATIENT
Start: 2019-11-18 | End: 2019-11-18 | Stop reason: HOSPADM

## 2019-11-17 RX ORDER — LEVOTHYROXINE SODIUM 0.05 MG/1
50 TABLET ORAL
Status: DISCONTINUED | OUTPATIENT
Start: 2019-11-18 | End: 2019-11-18 | Stop reason: HOSPADM

## 2019-11-17 RX ADMIN — SODIUM CHLORIDE 500 ML: 9 INJECTION, SOLUTION INTRAVENOUS at 12:57

## 2019-11-17 RX ADMIN — METOCLOPRAMIDE 10 MG: 5 INJECTION, SOLUTION INTRAMUSCULAR; INTRAVENOUS at 17:26

## 2019-11-17 RX ADMIN — DIPHENHYDRAMINE HYDROCHLORIDE 25 MG: 50 INJECTION INTRAMUSCULAR; INTRAVENOUS at 17:10

## 2019-11-17 RX ADMIN — KETOROLAC TROMETHAMINE 15 MG: 15 INJECTION, SOLUTION INTRAMUSCULAR; INTRAVENOUS at 17:14

## 2019-11-17 RX ADMIN — TETRACAINE HYDROCHLORIDE 2 DROP: 5 SOLUTION OPHTHALMIC at 15:42

## 2019-11-17 RX ADMIN — METOCLOPRAMIDE 5 MG: 5 INJECTION, SOLUTION INTRAMUSCULAR; INTRAVENOUS at 12:57

## 2019-11-17 RX ADMIN — SODIUM CHLORIDE 1000 ML: 9 INJECTION, SOLUTION INTRAVENOUS at 17:08

## 2019-11-17 RX ADMIN — DIPHENHYDRAMINE HYDROCHLORIDE 12.5 MG: 50 INJECTION, SOLUTION INTRAMUSCULAR; INTRAVENOUS at 12:57

## 2019-11-17 NOTE — ED PROVIDER NOTES
"Subjective   Earline Santacruz is a 74 year old female presenting to the ED today with complaints of a headache. She reports for the past 2 weeks she has been experiencing a headache so she presented to [facility]. She was admitted to [facility] for 7 days and reports a temporal artery biopsy was performed and found to be normal. She was discharged from [facility] 2 days ago and prescribed Baclofen, Norco, Tylenol, and a muscle relaxer. She states the pain is constant, most prominent at her right eye, and describes it as throbbing and sharp. She states sleep helps relieve some of her pain, however light exacerbates it. She states having headaches in the past but never this severe. Additional complains include right eye drainage, intermittent ear \"popping and cracking\", nausea, and vomiting, however she denies fever. She has a history of pemphigoid autoimmune disease and diabetes mellitus. She has had a hysterectomy and a breast biopsy. She denies smoking, use of alcohol, or illegal drugs. She has upcoming appointments scheduled with Dr. Abarca, neurology, Dr. Valdez, dermatologist, Dr. Pruett, ENT, and Dr. Randhawa, primary care. There are no other acute complaints at this time.                History provided by:  Patient  Headache   Location: right eye.  Quality: sharp and throbbing.  Radiates to:  Does not radiate  Severity currently:  10/10  Onset quality:  Sudden  Duration:  2 weeks  Timing:  Constant  Progression:  Worsening  Chronicity:  New  Similar to prior headaches: no    Relieved by: mild relief from sleep.  Worsened by:  Light  Ineffective treatments:  Prescription medications  Associated symptoms: eye pain (right), nausea and vomiting    Associated symptoms: no fever        Review of Systems   Constitutional: Negative for chills and fever.   HENT:        Positive for intermittent ear \"popping and cracking\".   Eyes: Positive for pain (right) and discharge (right).   Gastrointestinal: Positive for " nausea and vomiting.   Neurological: Positive for headaches.   All other systems reviewed and are negative.      Past Medical History:   Diagnosis Date   • Diabetes mellitus (CMS/HCC)    • Hyperlipidemia    • Hypertension    • Panic attacks        Allergies   Allergen Reactions   • Sulfa Antibiotics      Headaches       Past Surgical History:   Procedure Laterality Date   • APPENDECTOMY     • BREAST LUMPECTOMY      RIGHT    • CATARACT EXTRACTION, BILATERAL     • HAND SURGERY      LEFT    • TOTAL ABDOMINAL HYSTERECTOMY         Family History   Problem Relation Age of Onset   • No Known Problems Mother    • No Known Problems Father        Social History     Socioeconomic History   • Marital status:      Spouse name: Not on file   • Number of children: Not on file   • Years of education: Not on file   • Highest education level: Not on file   Tobacco Use   • Smoking status: Never Smoker   • Smokeless tobacco: Never Used   Substance and Sexual Activity   • Alcohol use: No   • Drug use: No   • Sexual activity: Defer         Objective   Physical Exam   Constitutional: She is oriented to person, place, and time. She appears well-developed and well-nourished. No distress.   HENT:   Head: Normocephalic and atraumatic.   Right Ear: Tympanic membrane normal.   Left Ear: Tympanic membrane normal.   Eyes: Conjunctivae are normal. Pupils are equal, round, and reactive to light. No scleral icterus.   Abrasions over right eyelid. Normal fluorescein exam. IOP 10   Neck: Normal range of motion. Neck supple.   Cardiovascular: Normal rate, regular rhythm and normal heart sounds. Exam reveals no gallop and no friction rub.   No murmur heard.  Pulmonary/Chest: Effort normal and breath sounds normal. No respiratory distress.   Abdominal: Soft. There is no tenderness.   Musculoskeletal: Normal range of motion.   Neurological: She is alert and oriented to person, place, and time. No cranial nerve deficit or sensory deficit. She  exhibits normal muscle tone. Coordination normal.   Skin: Skin is warm and dry.   Psychiatric: She has a normal mood and affect. Her behavior is normal.   Nursing note and vitals reviewed.      Procedures         ED Course  ED Course as of Nov 17 2141   Sun Nov 17, 2019   [address] records:  Negative right temporal artery biopsy  Negative MRI brain/ orbits  Evaluated by neurology   [WT]      ED Course User Index  [WT] Sarah Mix, OSCAR     Recent Results (from the past 24 hour(s))   Comprehensive Metabolic Panel    Collection Time: 11/17/19 12:56 PM   Result Value Ref Range    Glucose 155 (H) 65 - 99 mg/dL    BUN 15 8 - 23 mg/dL    Creatinine 0.64 0.57 - 1.00 mg/dL    Sodium 140 136 - 145 mmol/L    Potassium 4.1 3.5 - 5.2 mmol/L    Chloride 101 98 - 107 mmol/L    CO2 29.0 22.0 - 29.0 mmol/L    Calcium 9.0 8.6 - 10.5 mg/dL    Total Protein 6.4 6.0 - 8.5 g/dL    Albumin 4.00 3.50 - 5.20 g/dL    ALT (SGPT) 12 1 - 33 U/L    AST (SGOT) 14 1 - 32 U/L    Alkaline Phosphatase 51 39 - 117 U/L    Total Bilirubin 0.4 0.2 - 1.2 mg/dL    eGFR Non African Amer 91 >60 mL/min/1.73    Globulin 2.4 gm/dL    A/G Ratio 1.7 g/dL    BUN/Creatinine Ratio 23.4 7.0 - 25.0    Anion Gap 10.0 5.0 - 15.0 mmol/L   CBC Auto Differential    Collection Time: 11/17/19 12:56 PM   Result Value Ref Range    WBC 9.90 3.40 - 10.80 10*3/mm3    RBC 4.24 3.77 - 5.28 10*6/mm3    Hemoglobin 13.6 12.0 - 15.9 g/dL    Hematocrit 39.8 34.0 - 46.6 %    MCV 93.9 79.0 - 97.0 fL    MCH 32.1 26.6 - 33.0 pg    MCHC 34.2 31.5 - 35.7 g/dL    RDW 12.3 12.3 - 15.4 %    RDW-SD 42.2 37.0 - 54.0 fl    MPV 9.3 6.0 - 12.0 fL    Platelets 294 140 - 450 10*3/mm3    Neutrophil % 61.7 42.7 - 76.0 %    Lymphocyte % 29.0 19.6 - 45.3 %    Monocyte % 6.5 5.0 - 12.0 %    Eosinophil % 2.1 0.3 - 6.2 %    Basophil % 0.5 0.0 - 1.5 %    Immature Grans % 0.2 0.0 - 0.5 %    Neutrophils, Absolute 6.11 1.70 - 7.00 10*3/mm3    Lymphocytes, Absolute 2.87 0.70 - 3.10 10*3/mm3    Monocytes,  "Absolute 0.64 0.10 - 0.90 10*3/mm3    Eosinophils, Absolute 0.21 0.00 - 0.40 10*3/mm3    Basophils, Absolute 0.05 0.00 - 0.20 10*3/mm3    Immature Grans, Absolute 0.02 0.00 - 0.05 10*3/mm3    nRBC 0.0 0.0 - 0.2 /100 WBC     Note: In addition to lab results from this visit, the labs listed above may include labs taken at another facility or during a different encounter within the last 24 hours. Please correlate lab times with ED admission and discharge times for further clarification of the services performed during this visit.    No orders to display     Vitals:    11/17/19 2000 11/17/19 2030 11/17/19 2109 11/17/19 2134   BP: 140/67 142/65 136/60 146/88   BP Location:   Right arm Right arm   Patient Position:   Lying Lying   Pulse:   60 68   Resp:   16 18   Temp:   98.4 °F (36.9 °C) 98.3 °F (36.8 °C)   TempSrc:   Oral Oral   SpO2: 97% 97% 96%    Weight:    58.4 kg (128 lb 12.8 oz)   Height:    154.9 cm (61\")     Medications   metoclopramide (REGLAN) injection 5 mg (5 mg Intravenous Given 11/17/19 1257)   diphenhydrAMINE (BENADRYL) injection 12.5 mg (12.5 mg Intravenous Given 11/17/19 1257)   sodium chloride 0.9 % bolus 500 mL (0 mL Intravenous Stopped 11/17/19 1327)   tetracaine (ALTACAINE) 0.5 % ophthalmic solution 2 drop (2 drops Right Eye Given 11/17/19 1542)   sodium chloride 0.9 % bolus 1,000 mL (0 mL Intravenous Stopped 11/17/19 1800)   metoclopramide (REGLAN) injection 10 mg (10 mg Intravenous Given 11/17/19 1726)   ketorolac (TORADOL) injection 15 mg (15 mg Intravenous Given 11/17/19 1714)   diphenhydrAMINE (BENADRYL) injection 25 mg (25 mg Intravenous Given 11/17/19 1710)     ECG/EMG Results (last 24 hours)     ** No results found for the last 24 hours. **        No orders to display                       MDM  Number of Diagnoses or Management Options  Intractable headache, unspecified chronicity pattern, unspecified headache type:   Diagnosis management comments: Patient presents complaining of pain in the " right temporal region.  She is recently been admitted for 1 week at [facility].  She had a negative MRI of the brain and orbits.  She had a negative temporal artery biopsy.  Patient continues to have intractable headache.  She is mildly improved in the emergency department with Reglan, Benadryl, Tylenol.  She does have lesions over her eyelid which she states she has been rubbing at, currently these appear to be only scabs and not vesicular or herpetic lesions.  She has no dendritic lesions on fluorescein exam.  She has a normal visual acuity.  She has seen neurology at the outside hospital and refused lumbar puncture.  She will be admitted for intractable headache.       Amount and/or Complexity of Data Reviewed  Clinical lab tests: reviewed        Final diagnoses:   Intractable headache, unspecified chronicity pattern, unspecified headache type       Documentation assistance provided by sydnie Parnell.  Information recorded by the sydnie was done at my direction and has been verified and validated by me.     Taya Parnell  11/17/19 1326       Taya Parnell  11/17/19 1642       Sarah Mix PA-C  11/17/19 [id number]

## 2019-11-18 VITALS
WEIGHT: 128.8 LBS | RESPIRATION RATE: 18 BRPM | SYSTOLIC BLOOD PRESSURE: 123 MMHG | BODY MASS INDEX: 24.32 KG/M2 | HEART RATE: 75 BPM | OXYGEN SATURATION: 95 % | HEIGHT: 61 IN | DIASTOLIC BLOOD PRESSURE: 66 MMHG | TEMPERATURE: 98.6 F

## 2019-11-18 LAB
ANION GAP SERPL CALCULATED.3IONS-SCNC: 13 MMOL/L (ref 5–15)
BASOPHILS # BLD AUTO: 0.05 10*3/MM3 (ref 0–0.2)
BASOPHILS NFR BLD AUTO: 0.5 % (ref 0–1.5)
BUN BLD-MCNC: 8 MG/DL (ref 8–23)
BUN/CREAT SERPL: 15.1 (ref 7–25)
CALCIUM SPEC-SCNC: 8.6 MG/DL (ref 8.6–10.5)
CHLORIDE SERPL-SCNC: 106 MMOL/L (ref 98–107)
CO2 SERPL-SCNC: 24 MMOL/L (ref 22–29)
CREAT BLD-MCNC: 0.53 MG/DL (ref 0.57–1)
DEPRECATED RDW RBC AUTO: 41.1 FL (ref 37–54)
EOSINOPHIL # BLD AUTO: 0.17 10*3/MM3 (ref 0–0.4)
EOSINOPHIL NFR BLD AUTO: 1.7 % (ref 0.3–6.2)
ERYTHROCYTE [DISTWIDTH] IN BLOOD BY AUTOMATED COUNT: 12.1 % (ref 12.3–15.4)
GFR SERPL CREATININE-BSD FRML MDRD: 113 ML/MIN/1.73
GLUCOSE BLD-MCNC: 128 MG/DL (ref 65–99)
GLUCOSE BLDC GLUCOMTR-MCNC: 102 MG/DL (ref 70–130)
GLUCOSE BLDC GLUCOMTR-MCNC: 152 MG/DL (ref 70–130)
HBA1C MFR BLD: 5.5 % (ref 4.8–5.6)
HCT VFR BLD AUTO: 38.6 % (ref 34–46.6)
HGB BLD-MCNC: 12.9 G/DL (ref 12–15.9)
IMM GRANULOCYTES # BLD AUTO: 0.03 10*3/MM3 (ref 0–0.05)
IMM GRANULOCYTES NFR BLD AUTO: 0.3 % (ref 0–0.5)
LYMPHOCYTES # BLD AUTO: 4.21 10*3/MM3 (ref 0.7–3.1)
LYMPHOCYTES NFR BLD AUTO: 41.6 % (ref 19.6–45.3)
MCH RBC QN AUTO: 31 PG (ref 26.6–33)
MCHC RBC AUTO-ENTMCNC: 33.4 G/DL (ref 31.5–35.7)
MCV RBC AUTO: 92.8 FL (ref 79–97)
MONOCYTES # BLD AUTO: 0.69 10*3/MM3 (ref 0.1–0.9)
MONOCYTES NFR BLD AUTO: 6.8 % (ref 5–12)
NEUTROPHILS # BLD AUTO: 4.97 10*3/MM3 (ref 1.7–7)
NEUTROPHILS NFR BLD AUTO: 49.1 % (ref 42.7–76)
NRBC BLD AUTO-RTO: 0 /100 WBC (ref 0–0.2)
PLATELET # BLD AUTO: 297 10*3/MM3 (ref 140–450)
PMV BLD AUTO: 9.4 FL (ref 6–12)
POTASSIUM BLD-SCNC: 3.6 MMOL/L (ref 3.5–5.2)
RBC # BLD AUTO: 4.16 10*6/MM3 (ref 3.77–5.28)
SODIUM BLD-SCNC: 143 MMOL/L (ref 136–145)
TSH SERPL DL<=0.05 MIU/L-ACNC: 1.2 UIU/ML (ref 0.27–4.2)
WBC NRBC COR # BLD: 10.12 10*3/MM3 (ref 3.4–10.8)

## 2019-11-18 PROCEDURE — 82962 GLUCOSE BLOOD TEST: CPT

## 2019-11-18 PROCEDURE — G0378 HOSPITAL OBSERVATION PER HR: HCPCS

## 2019-11-18 PROCEDURE — 25010000002 ENOXAPARIN PER 10 MG: Performed by: INTERNAL MEDICINE

## 2019-11-18 PROCEDURE — 96372 THER/PROPH/DIAG INJ SC/IM: CPT

## 2019-11-18 PROCEDURE — 83036 HEMOGLOBIN GLYCOSYLATED A1C: CPT | Performed by: INTERNAL MEDICINE

## 2019-11-18 PROCEDURE — 96361 HYDRATE IV INFUSION ADD-ON: CPT

## 2019-11-18 PROCEDURE — 97166 OT EVAL MOD COMPLEX 45 MIN: CPT | Performed by: OCCUPATIONAL THERAPIST

## 2019-11-18 PROCEDURE — 97161 PT EVAL LOW COMPLEX 20 MIN: CPT

## 2019-11-18 PROCEDURE — 63710000001 INSULIN LISPRO (HUMAN) PER 5 UNITS: Performed by: INTERNAL MEDICINE

## 2019-11-18 PROCEDURE — 97116 GAIT TRAINING THERAPY: CPT

## 2019-11-18 PROCEDURE — 85025 COMPLETE CBC W/AUTO DIFF WBC: CPT | Performed by: INTERNAL MEDICINE

## 2019-11-18 PROCEDURE — 93010 ELECTROCARDIOGRAM REPORT: CPT | Performed by: INTERNAL MEDICINE

## 2019-11-18 PROCEDURE — 84443 ASSAY THYROID STIM HORMONE: CPT | Performed by: INTERNAL MEDICINE

## 2019-11-18 PROCEDURE — 80048 BASIC METABOLIC PNL TOTAL CA: CPT | Performed by: INTERNAL MEDICINE

## 2019-11-18 PROCEDURE — 99217 PR OBSERVATION CARE DISCHARGE MANAGEMENT: CPT | Performed by: INTERNAL MEDICINE

## 2019-11-18 RX ORDER — VALACYCLOVIR HYDROCHLORIDE 1 G/1
1000 TABLET, FILM COATED ORAL 3 TIMES DAILY
Qty: 30 TABLET | Refills: 0 | Status: SHIPPED | OUTPATIENT
Start: 2019-11-18 | End: 2019-11-28

## 2019-11-18 RX ORDER — GABAPENTIN 400 MG/1
400 CAPSULE ORAL 3 TIMES DAILY
Qty: 21 CAPSULE | Refills: 0 | Status: SHIPPED | OUTPATIENT
Start: 2019-11-18 | End: 2022-10-31

## 2019-11-18 RX ADMIN — SODIUM CHLORIDE, PRESERVATIVE FREE 10 ML: 5 INJECTION INTRAVENOUS at 09:12

## 2019-11-18 RX ADMIN — VALACYCLOVIR HYDROCHLORIDE 1000 MG: 500 TABLET, FILM COATED ORAL at 00:56

## 2019-11-18 RX ADMIN — GABAPENTIN 400 MG: 400 CAPSULE ORAL at 00:56

## 2019-11-18 RX ADMIN — VALACYCLOVIR HYDROCHLORIDE 1000 MG: 500 TABLET, FILM COATED ORAL at 08:59

## 2019-11-18 RX ADMIN — ROSUVASTATIN CALCIUM 20 MG: 20 TABLET, COATED ORAL at 09:00

## 2019-11-18 RX ADMIN — ACETAMINOPHEN 650 MG: 325 TABLET ORAL at 09:48

## 2019-11-18 RX ADMIN — PANTOPRAZOLE SODIUM 40 MG: 40 TABLET, DELAYED RELEASE ORAL at 09:00

## 2019-11-18 RX ADMIN — ENOXAPARIN SODIUM 40 MG: 40 INJECTION SUBCUTANEOUS at 09:00

## 2019-11-18 RX ADMIN — SODIUM CHLORIDE 75 ML/HR: 9 INJECTION, SOLUTION INTRAVENOUS at 00:57

## 2019-11-18 RX ADMIN — SODIUM CHLORIDE, PRESERVATIVE FREE 10 ML: 5 INJECTION INTRAVENOUS at 00:57

## 2019-11-18 RX ADMIN — GABAPENTIN 400 MG: 400 CAPSULE ORAL at 06:13

## 2019-11-18 RX ADMIN — LEVOTHYROXINE SODIUM 50 MCG: 50 TABLET ORAL at 06:13

## 2019-11-18 RX ADMIN — ASPIRIN 81 MG: 81 TABLET, COATED ORAL at 09:00

## 2019-11-18 NOTE — CONSULTS
INFECTIOUS DISEASE CONSULT/INITIAL HOSPITAL VISIT    Earline Santacruz  1945  [id number]    Date of Consult: 11/18/2019    Admission Date: 11/17/2019      Requesting Provider: No ref. provider found  Evaluating Physician: Cabrera Hernandez MD    Reason for Consultation:right eye zoster ophthalmicus   History of present illness:    Patient is a 74 y.o. female seen today for right eye zoster ophthalmicus. She was recently hospitalized at [facility] for a persistent right sided headache, undergoing a temporal artery biopsy which was negative. She was discharged last Thursday, but she continued to have pain.  Over the weekend she began developing some lesions around her right eye, and presented to the ED.  She continues to complain of pain and blurred vision.  She has been afebrile without leukocytosis.  Plans to discharge for opthalmology evaluation as outpatient .     Past Medical History:   Diagnosis Date   • Diabetes mellitus (CMS/[facility])    • Hyperlipidemia    • Hypertension    • Panic attacks        Past Surgical History:   Procedure Laterality Date   • APPENDECTOMY     • BREAST LUMPECTOMY      RIGHT    • CATARACT EXTRACTION, BILATERAL     • HAND SURGERY      LEFT    • TOTAL ABDOMINAL HYSTERECTOMY         Family History   Problem Relation Age of Onset   • No Known Problems Mother    • No Known Problems Father        Social History     Socioeconomic History   • Marital status:      Spouse name: Not on file   • Number of children: Not on file   • Years of education: Not on file   • Highest education level: Not on file   Tobacco Use   • Smoking status: Never Smoker   • Smokeless tobacco: Never Used   Substance and Sexual Activity   • Alcohol use: No   • Drug use: No   • Sexual activity: Defer       Allergies   Allergen Reactions   • Sulfa Antibiotics      Headaches         Medication:    Current Facility-Administered Medications:   •  acetaminophen (TYLENOL) tablet 650 mg, 650 mg, Oral, Q4H PRN, 650 mg at  11/18/19 0948 **OR** acetaminophen (TYLENOL) 160 MG/5ML solution 650 mg, 650 mg, Oral, Q4H PRN **OR** acetaminophen (TYLENOL) suppository 650 mg, 650 mg, Rectal, Q4H PRN, Zeke, Yarely G, DO  •  ALPRAZolam (XANAX) tablet 0.5 mg, 0.5 mg, Oral, Nightly PRN, Zeke, Yarely G, DO  •  aspirin EC tablet 81 mg, 81 mg, Oral, Daily, Zeke, Yarely G, DO, 81 mg at 11/18/19 0900  •  dextrose (D50W) 25 g/ 50mL Intravenous Solution 25 g, 25 g, Intravenous, Q15 Min PRN, Zeke, Yarely G, DO  •  dextrose (GLUTOSE) oral gel 15 g, 15 g, Oral, Q15 Min PRN, Zeke, Yarely G, DO  •  enoxaparin (LOVENOX) syringe 40 mg, 40 mg, Subcutaneous, Q24H, ZekeRea lentzsie G, DO, 40 mg at 11/18/19 0900  •  gabapentin (NEURONTIN) capsule 400 mg, 400 mg, Oral, Q8H, Zeke, Yarely G, DO, 400 mg at 11/18/19 0613  •  glucagon (human recombinant) (GLUCAGEN DIAGNOSTIC) injection 1 mg, 1 mg, Subcutaneous, Q15 Min PRN, Zeke, Yarely G, DO  •  HYDROmorphone (DILAUDID) injection 0.25 mg, 0.25 mg, Intravenous, Q2H PRN **AND** naloxone (NARCAN) injection 0.4 mg, 0.4 mg, Intravenous, Q5 Min PRN, Zeke, Yarely G, DO  •  insulin lispro (humaLOG) injection 0-7 Units, 0-7 Units, Subcutaneous, 4x Daily With Meals & Nightly, Zeke, Yarely G, DO  •  levothyroxine (SYNTHROID, LEVOTHROID) tablet 50 mcg, 50 mcg, Oral, Q AM, Zeke, Yarely G, DO, 50 mcg at 11/18/19 0613  •  ondansetron (ZOFRAN) tablet 4 mg, 4 mg, Oral, Q6H PRN **OR** ondansetron (ZOFRAN) injection 4 mg, 4 mg, Intravenous, Q6H PRN, Zeke, Yarely G, DO  •  pantoprazole (PROTONIX) EC tablet 40 mg, 40 mg, Oral, Daily, Zeke, Yarely G, DO, 40 mg at 11/18/19 0900  •  rosuvastatin (CRESTOR) tablet 20 mg, 20 mg, Oral, Daily, Zeke, Yarely G, DO, 20 mg at 11/18/19 0900  •  sodium chloride 0.9 % flush 10 mL, 10 mL, Intravenous, Q12H, Zeke, Yarely G, DO, 10 mL at 11/18/19 0912  •  sodium chloride 0.9 % flush 10 mL, 10 mL, Intravenous, PRN, Zeke, Yarely G, DO  •  sodium chloride 0.9 % infusion, 75 mL/hr, Intravenous,  Continuous, Zeke, Yarely G, DO, Last Rate: 75 mL/hr at 19 0601, 75 mL/hr at 19 0601  •  valACYclovir (VALTREX) tablet 1,000 mg, 1,000 mg, Oral, Q8H, Zeke, Yarely G, DO, 1,000 mg at 19 0859    Antibiotics:  Anti-Infectives (From admission, onward)    Ordered     Dose/Rate Route Frequency Start Stop    19 2327  valACYclovir (VALTREX) tablet 1,000 mg     Ordering Provider:  Zeke, Yarely G, DO    1,000 mg Oral Every 8 Hours Scheduled 19 0000 19 2359    19 0956  valACYclovir (VALTREX) 1000 MG tablet     Ordering Provider:  Breann Bergman APRN    1,000 mg Oral 3 Times Daily 19 0000 19 2359            Review of Systems:  Constitutional-- No Fever, chills or sweats.  Appetite good, and no malaise. No fatigue.  HEENT-- + blurred vision , hearing or throat complaints.  No epistaxis or oral sores.  Denies odynophagia or dysphagia. No headache, photophobia or neck stiffness. + right sided headache  CV-- No chest pain, palpitation or syncope  Resp-- No SOB/cough/Hemoptysis  GI- No nausea, vomiting, or diarrhea.  No hematochezia, melena, or hematemesis. Denies jaundice or chronic liver disease.  -- No dysuria, hematuria, or flank pain.  Denies hesitancy, urgency or flank pain.  Lymph- no swollen lymph nodes in neck/axilla or groin.   Heme- No active bruising or bleeding; no Hx of DVT or PE.  MS-- no swelling or pain in the bones or joints of arms/legs.  No new back pain.  Neuro-- No acute focal weakness or numbness in the arms or legs.  No seizures.+ neuropathy         Physical Exam:   Vital Signs  Temp (24hrs), Av.7 °F (37.1 °C), Min:98.3 °F (36.8 °C), Max:99.1 °F (37.3 °C)    Temp  Min: 98.3 °F (36.8 °C)  Max: 99.1 °F (37.3 °C)  BP  Min: 123/66  Max: 153/66  Pulse  Min: 60  Max: 75  Resp  Min: 16  Max: 18  SpO2  Min: 93 %  Max: 100 %    GENERAL: Awake and alert, in no acute distress.   HEENT: Normocephalic, atraumatic.  PERRL. EOMI. Right eye with conjuctival  erythema, right side of eye with small scabbed lesions, and one on lid No icterus. Oropharynx clear without evidence of thrush or exudate. No evidence of peridontal disease.    HEART: RRR; No murmur, rubs, gallops.   LUNGS: Clear to auscultation bilaterally without wheezing, rales, rhonchi. Normal respiratory effort. Nonlabored. No dullness.  ABDOMEN: Soft, nontender, nondistended. Positive bowel sounds. No rebound or guarding. NO mass or HSM.  EXT:  No cyanosis, clubbing or edema. No cord.  :  Without Medrano catheter.  MSK:  No joint effusions  SKIN: She has no generalized rash.  She has several small scabbed lesions over the lateral right periorbital region and her eyelid.  NEURO: Oriented to PPT.  PSYCHIATRIC: Normal insight and judgement. Cooperative with PE    Laboratory Data    Results from last 7 days   Lab Units 11/18/19  0657 11/17/19  1256   WBC 10*3/mm3 10.12 9.90   HEMOGLOBIN g/dL 12.9 13.6   HEMATOCRIT % 38.6 39.8   PLATELETS 10*3/mm3 297 294     Results from last 7 days   Lab Units 11/18/19  0657   SODIUM mmol/L 143   POTASSIUM mmol/L 3.6   CHLORIDE mmol/L 106   CO2 mmol/L 24.0   BUN mg/dL 8   CREATININE mg/dL 0.53*   GLUCOSE mg/dL 128*   CALCIUM mg/dL 8.6     Results from last 7 days   Lab Units 11/17/19  1256   ALK PHOS U/L 51   BILIRUBIN mg/dL 0.4   ALT (SGPT) U/L 12   AST (SGOT) U/L 14     Results from last 7 days   Lab Units 11/17/19  1256   SED RATE mm/hr 2                     Estimated Creatinine Clearance: 51.4 mL/min (A) (by C-G formula based on SCr of 0.53 mg/dL (L)).      Microbiology:                                Radiology:  Imaging Results (Last 72 Hours)     ** No results found for the last 72 hours. **            Impression:   1.  Right eye zoster ophthalmicus- this presented as persistent pain with late onset of ulcerative lesions.  This was a difficult diagnosis to make while she was at [facility].  I did discuss her clinical situation with her physicians at [facility], both Dr. Vincent and Dr. Barakat.  I will plan for her to receive high-dose oral Valtrex.  She is complaining of some right eye visual disturbance and she will need to be seen by ophthalmology.  2.  Diabetes Mellitus, type 2      PLAN/RECOMMENDATIONS:   Thank you for asking us to see Earline Santacruz, I recommend the followin. Ophthalmology evaluation   2.  Valtrex 1g po tid   3.  Follow up with Dr. Hernandez Friday at 1230  appt made    Dr. Hernandez has obtained the history, performed the physical exam and formulated the above treatment plan.     I discussed her clinical situation with Dr. Pascal.  I discussed her situation with both Dr. Vincent and Dr. Barakat today.    Cabrera Hernandez MD  2019  12:55 PM

## 2019-11-18 NOTE — THERAPY EVALUATION
Acute Care - Occupational Therapy Initial Evaluation  [facility]     Patient Name: Earline Santacruz  : 1945  MRN: [id number]  Today's Date: 2019     Date of Referral to OT: 19       Admit Date: 2019       ICD-10-CM ICD-9-CM   1. Intractable headache, unspecified chronicity pattern, unspecified headache type R51 784.0   2. Impaired mobility and ADLs Z74.09 799.89     Patient Active Problem List   Diagnosis   • CAD (coronary artery disease)   • Dyslipidemia   • Type 2 diabetes mellitus (CMS/HCC)   • Hypothyroidism   • Chronic pain   • Pemphigoid   • Obstructive sleep apnea   • GERD (gastroesophageal reflux disease)   • Headache   • Zoster     Past Medical History:   Diagnosis Date   • Diabetes mellitus (CMS/HCC)    • Hyperlipidemia    • Hypertension    • Panic attacks      Past Surgical History:   Procedure Laterality Date   • APPENDECTOMY     • BREAST LUMPECTOMY      RIGHT    • CATARACT EXTRACTION, BILATERAL     • HAND SURGERY      LEFT    • TOTAL ABDOMINAL HYSTERECTOMY            OT ASSESSMENT FLOWSHEET (last 12 hours)      Occupational Therapy Evaluation     Row Name 19 0720                   OT Evaluation Time/Intention    Subjective Information  complains of;pain  -ST        Document Type  evaluation  -ST        Mode of Treatment  individual therapy;occupational therapy  -ST        Patient Effort  good  -ST        Symptoms Noted During/After Treatment  none  -ST           General Information    Patient Profile Reviewed?  yes  -ST        Patient Observations  alert;cooperative;agree to therapy  -ST        Patient/Family Observations  no family present  -ST        General Observations of Patient  Pt sitting UIB at arrival; IV intact  -ST        Prior Level of Function  independent:;all household mobility;community mobility;transfer;bed mobility;feeding;grooming;dressing;bathing;home management;cooking;cleaning;driving;shopping;using stairs prior to ~2-3 wks ago, then progressive  weakness  -ST        Pertinent History of Current Functional Problem  Pt presents to [facility] w/ c/o H/A and frontal eye pain around 10 days ago. Underwent R temporal artery bx and MRI, both (-) and d/c'ed home. Pt continued to have severe burning and sharp, stabbing R eye and frontal pain/skin sensitivity along with photophobia along with N/V and brought to [facility] for further examination. Work-up reveals probable shingles of eye, will attempt to transfer to  eye care  -ST        Existing Precautions/Restrictions  fall;other (see comments) vision; shingles precautions  -ST        Limitations/Impairments  visual  -ST        Risks Reviewed  patient:;LOB;nausea/vomiting;dizziness;increased discomfort;change in vital signs  -ST        Benefits Reviewed  patient:;improve function;increase independence;increase strength;increase balance;decrease pain;increase knowledge  -ST        Barriers to Rehab  medically complex  -ST           Relationship/Environment    Primary Source of Support/Comfort  spouse  -ST        Concerns About Impact on Relationships  pt lives w/her ex- (she will stay on 1st floor and him on 2nd floor)  -ST           Resource/Environmental Concerns    Current Living Arrangements  home/apartment/condo  -ST           Home Main Entrance    Number of Stairs, Main Entrance  four  -ST           Cognitive Assessment/Interventions    Additional Documentation  Cognitive Assessment/Intervention (Group)  -ST           Cognitive Assessment/Intervention- PT/OT    Affect/Mental Status (Cognitive)  WNL  -ST        Orientation Status (Cognition)  oriented x 4  -ST        Follows Commands (Cognition)  WNL  -ST        Cognitive Function (Cognitive)  WNL  -ST           Safety Issues, Functional Mobility    Safety Issues Affecting Function (Mobility)  judgment  -ST        Impairments Affecting Function (Mobility)  balance  -ST        Comment, Safety Issues/Impairments (Mobility)  judgement and balance d/t visual  deficits   -ST           Bed Mobility Assessment/Treatment    Bed Mobility Assessment/Treatment  supine-sit;sit-supine  -ST        Supine-Sit [place] (Bed Mobility)  minimum assist (75% patient effort)  -ST        Sit-Supine [place] (Bed Mobility)  supervision  -ST        Bed Mobility, Safety Issues  impaired trunk control for bed mobility  -ST        Assistive Device (Bed Mobility)  head of bed elevated  -ST        Comment (Bed Mobility)  cuing for sequencing   -ST           Functional Mobility    Functional Mobility- Ind. Level  contact guard assist  -ST        Functional Mobility-Distance (Feet)  15  -ST        Functional Mobility- Comment  HHA for decreased balance, slight posterior lean and mild LOB laterally Xseveral instances   -ST           Transfer Assessment/Treatment    Transfer Assessment/Treatment  sit-stand transfer;stand-sit transfer;toilet transfer  -ST           Sit-Stand Transfer    Sit-Stand [place] (Transfers)  supervision  -ST           Stand-Sit Transfer    Stand-Sit [place] (Transfers)  supervision  -ST           Toilet Transfer    Type (Toilet Transfer)  sit-stand;stand-sit  -ST        [place] Level (Toilet Transfer)  contact guard  -ST        Assistive Device (Toilet Transfer)  raised toilet seat  -ST           ADL Assessment/Intervention    BADL Assessment/Intervention  lower body dressing;grooming;toileting  -ST           Lower Body Dressing Assessment/Training    Lower Body Dressing [place] Level  don;socks  -ST        Lower Body Dressing Position  long sitting  -ST           Grooming Assessment/Training    [place] Level (Grooming)  wash face, hands;set up  -ST        Grooming Position  sink side  -ST        Comment (Grooming)  leaning against sink for additional support   -ST           Toileting Assessment/Training    [place] Level (Toileting)  perform perineal hygiene;set up  -ST        Assistive Devices (Toileting)  raised toilet seat  -ST            BADL Safety/Performance    Impairments, BADL Safety/Performance  balance;endurance/activity tolerance  -ST           General ROM    GENERAL ROM COMMENTS  BUCLARY's WFL for ADLs   -ST           MMT (Manual Muscle Testing)    General MMT Comments  4+/5  -ST           Motor Assessment/Interventions    Additional Documentation  Balance (Group)  -ST           Balance    Balance  dynamic sitting balance;dynamic standing balance  -ST           Dynamic Sitting Balance    Level of [place], Reaches Outside Midline (Sitting, Dynamic Balance)  supervision  -ST        Sitting Position, Reaches Outside Midline (Sitting, Dynamic Balance)  sitting on edge of bed  -ST           Dynamic Standing Balance    Level of [place], Reaches Outside Midline (Standing, Dynamic Balance)  contact guard assist  -ST        Time Able to Maintain Position, Reaches Outside Midline (Standing, Dynamic Balance)  1 to 2 minutes  -ST           Sensory Assessment/Intervention    Sensory General Assessment  no sensation deficits identified  -ST        Additional Documentation  Vision Assessment/Intervention (Group)  -ST           Vision Assessment/Intervention    Visual Impairment/Limitations  blurry vision;distance vision impaired  -ST        Visual Field Deficit  other (see comments)  -ST        Impact of Vision Impairment on Function (Vision)  R eye impaired and impacting perception, reading and depth  -ST           Positioning and Restraints    Pre-Treatment Position  in bed  -ST        Post Treatment Position  bed  -ST        In Bed  notified nsg;supine;call light within reach;encouraged to call for assist;exit alarm on  -ST           Pain Assessment    Additional Documentation  Pain Scale: Numbers Pre/Post-Treatment (Group)  -ST           Pain Scale: Numbers Pre/Post-Treatment    Pain Scale: Numbers, Pretreatment  8/10  -ST        Pain Scale: Numbers, Post-Treatment  8/10  -ST        Pain Location - Side  Right  -ST        Pain Location  eye   -ST           Clinical Impression (OT)    Date of Referral to OT  11/18/19  -ST        OT Diagnosis  impaired ADLs  -ST        Prognosis (OT Eval)  good  -ST        Patient/Family Goals Statement (OT Eval)  decrease pain/return to PLOF  -ST        Criteria for Skilled Therapeutic Interventions Met (OT Eval)  yes  -ST        Rehab Potential (OT Eval)  good, to achieve stated therapy goals  -ST        Therapy Frequency (OT Eval)  daily  -ST        Care Plan Review (OT)  evaluation/treatment results reviewed;care plan/treatment goals reviewed;risks/benefits reviewed;current/potential barriers reviewed;patient/other agree to care plan  -ST        Anticipated Equipment Needs at Discharge (OT)  --  (Significant)  w/appropriate AD to be determined by PT  -ST        Anticipated Discharge Disposition (OT)  home with home health  -ST           Planned OT Interventions    Planned Therapy Interventions (OT Eval)  activity tolerance training;functional balance retraining;occupation/activity based interventions;patient/caregiver education/training;transfer/mobility retraining  -ST           OT Goals    Bed Mobility Goal Selection (OT)  bed mobility, OT goal 1  -ST        Transfer Goal Selection (OT)  transfer, OT goal 1  -ST        Balance Goal Selection (OT)  balance, OT goal 1  -ST        Problem Specific Goal Selection (OT)  problem specific goal 1, OT  -ST        Additional Documentation  Balance Goal Selection (OT) (Row);Problem Specific Goal Selection (OT) (Row)  -ST           Bed Mobility Goal 1 (OT)    Activity/Assistive Device (Bed Mobility Goal 1, OT)  sit to supine/supine to sit  -ST        [place] Level/Cues Needed (Bed Mobility Goal 1, OT)  independent  -ST        Time Frame (Bed Mobility Goal 1, OT)  long term goal (LTG);5 days  -ST        Progress/Outcomes (Bed Mobility Goal 1, OT)  goal ongoing  -ST           Transfer Goal 1 (OT)    Activity/Assistive Device (Transfer Goal 1, OT)   bed-to-chair/chair-to-bed;toilet;other (see comments)  -ST        [place] Level/Cues Needed (Transfer Goal 1, OT)  conditional independence  -ST        Time Frame (Transfer Goal 1, OT)  long term goal (LTG);5 days  -ST        Barriers (Transfers Goal 1, OT)  appropriate AD  -ST        Progress/Outcome (Transfer Goal 1, OT)  goal ongoing  -ST           Balance Goal 1 (OT)    Activity/Assistive Device (Balance Goal 1, OT)  standing, dynamic  -ST        [place] Level/Cues Needed (Balance Goal 1, OT)  conditional independence  -ST        Time Frame (Balance Goal 1, OT)  long term goal (LTG);5 days  -ST        Barriers (Balance Goal 1, OT)  complete fxnl task w/o LOB w/appropriate AD X5+ mins  -ST        Progress/Outcomes (Balance Goal 1, OT)  goal ongoing  -ST           Problem Specific Goal 1 (OT)    Problem Specific Goal 1 (OT)  Pt to be independent with visual compensatory strategies to increase safety with ADLs and community based activities  -ST        Time Frame (Problem Specific Goal 1, OT)  long term goal (LTG);5 days  -ST        Progress/Outcome (Problem Specific Goal 1, OT)  goal ongoing  -ST           Living Environment    Home Accessibility  stairs to enter home;tub/shower is not walk in  -ST          User Key  (r) = Recorded By, (t) = Taken By, (c) = Cosigned By    Initials Name Effective Dates     Mary Lou Kovacs, OTR 06/10/18 -          Occupational Therapy Education     Title: PT OT SLP Therapies (Done)     Topic: Occupational Therapy (Done)     Point: ADL training (Done)     Description: Instruct learner(s) on proper safety adaptation and remediation techniques during self care or transfers.   Instruct in proper use of assistive devices.    Learning Progress Summary           Patient Acceptance, E,TB,D, VU,DU by  at 11/18/2019  7:20 AM    Comment:  see flow sheet                   Point: Home exercise program (Done)     Description: Instruct learner(s) on appropriate technique for  monitoring, assisting and/or progressing therapeutic exercises/activities.    Learning Progress Summary           Patient Acceptance, E,TB,D, VU,DU by  at 11/18/2019  7:20 AM    Comment:  see flow sheet                   Point: Precautions (Done)     Description: Instruct learner(s) on prescribed precautions during self-care and functional transfers.    Learning Progress Summary           Patient Acceptance, E,TB,D, VU,DU by  at 11/18/2019  7:20 AM    Comment:  see flow sheet                   Point: Body mechanics (Done)     Description: Instruct learner(s) on proper positioning and spine alignment during self-care, functional mobility activities and/or exercises.    Learning Progress Summary           Patient Acceptance, E,TB,D, VU,DU by  at 11/18/2019  7:20 AM    Comment:  see flow sheet                               User Key     Initials Effective Dates Name Provider Type Discipline     06/10/18 -  Mary Lou Kovacs OTR Occupational Therapist OT                  OT Recommendation and Plan  Outcome Summary/Treatment Plan (OT)  Anticipated Equipment Needs at Discharge (OT): (S) (w/appropriate AD to be determined by PT)  Anticipated Discharge Disposition (OT): home with home health  Planned Therapy Interventions (OT Eval): activity tolerance training, functional balance retraining, occupation/activity based interventions, patient/caregiver education/training, transfer/mobility retraining  Therapy Frequency (OT Eval): daily  Plan of Care Review  Plan of Care Reviewed With: patient  Plan of Care Reviewed With: patient  Outcome Summary: Pt presents w/painful, blurry R eye, impacting depth perception and reading. Pt also exhibits decline in balance and fxnl task with ADLs d/t fatigue impacting overall safety. Recommend pt d/c home with HHPT and appropriate AD as recommended by physical therapist to improve balance. OT to follow IP to address ADL independence/safety, visual compensatory strategies, and AE/DME  needs.     Outcome Measures     Row Name 11/18/19 0720             How much help from another is currently needed...    Putting on and taking off regular lower body clothing?  3  -ST      Bathing (including washing, rinsing, and drying)  3  -ST      Toileting (which includes using toilet bed pan or urinal)  3  -ST      Putting on and taking off regular upper body clothing  3  -ST      Taking care of personal grooming (such as brushing teeth)  3  -ST      Eating meals  4  -ST      AM-PAC 6 Clicks Score (OT)  19  -ST         Functional Assessment    Outcome Measure Options  AM-PAC 6 Clicks Daily Activity (OT)  -ST        User Key  (r) = Recorded By, (t) = Taken By, (c) = Cosigned By    Initials Name Provider Type    Mary Lou Valencia OTR Occupational Therapist          Time Calculation:   Time Calculation- OT     Row Name 11/18/19 0720             Time Calculation- OT    OT Start Time  0720  -ST      OT Received On  07/20/19  -ST      OT Goal Re-Cert Due Date  11/18/19  -ST        User Key  (r) = Recorded By, (t) = Taken By, (c) = Cosigned By    Initials Name Provider Type    Mary Lou Valencia OTR Occupational Therapist        Therapy Charges for Today     Code Description Service Date Service Provider Modifiers Qty    [id number] HC OT EVAL MOD COMPLEXITY 4 11/18/2019 Mary Lou Kovacs OTR GO 1               DANIA Conrad  11/18/2019

## 2019-11-18 NOTE — PLAN OF CARE
Problem: Patient Care Overview  Goal: Plan of Care Review  Outcome: Ongoing (interventions implemented as appropriate)   11/18/19 1212   Coping/Psychosocial   Plan of Care Reviewed With patient   Plan of Care Review   Progress no change   OTHER   Outcome Summary VSS, pt has had no draiginage from right eye, complains she is losing vision, physician is aware, she has a appt magalys for eye, she is bieng discharged home with antiobiotcs        Problem: Fall Risk (Adult)  Goal: Identify Related Risk Factors and Signs and Symptoms  Outcome: Ongoing (interventions implemented as appropriate)   11/18/19 1212   Fall Risk (Adult)   Related Risk Factors (Fall Risk) environment unfamiliar   Signs and Symptoms (Fall Risk) presence of risk factors

## 2019-11-18 NOTE — THERAPY EVALUATION
Patient Name: Earline Santacruz  : 1945    MRN: [id number]                              Today's Date: 2019       Admit Date: 2019    Visit Dx:     ICD-10-CM ICD-9-CM   1. Intractable headache, unspecified chronicity pattern, unspecified headache type R51 784.0   2. Impaired mobility and ADLs Z74.09 799.89     Patient Active Problem List   Diagnosis   • CAD (coronary artery disease)   • Dyslipidemia   • Type 2 diabetes mellitus (CMS/HCC)   • Hypothyroidism   • Chronic pain   • Pemphigoid   • Obstructive sleep apnea   • GERD (gastroesophageal reflux disease)   • Headache   • Zoster     Past Medical History:   Diagnosis Date   • Diabetes mellitus (CMS/HCC)    • Hyperlipidemia    • Hypertension    • Panic attacks      Past Surgical History:   Procedure Laterality Date   • APPENDECTOMY     • BREAST LUMPECTOMY      RIGHT    • CATARACT EXTRACTION, BILATERAL     • HAND SURGERY      LEFT    • TOTAL ABDOMINAL HYSTERECTOMY       General Information     Row Name 19 1136          PT Evaluation Time/Intention    Document Type  evaluation  -KM     Mode of Treatment  physical therapy  -KM     Row Name 19 1136          General Information    Patient Profile Reviewed?  yes  -KM     Prior Level of Function  independent:;gait;transfer;bed mobility;ADL's  -KM     Existing Precautions/Restrictions  fall vision, airborne precautions for Shingles  -KM     Barriers to Rehab  medically complex  -KM     Row Name 19 1136          Relationship/Environment    Lives With  significant other  -KM     Row Name 19 1136          Resource/Environmental Concerns    Current Living Arrangements  home/apartment/condo  -KM     Row Name 19 1136          Home Main Entrance    Number of Stairs, Main Entrance  four  -KM     Stair Railings, Main Entrance  railing on left side (ascending)  -KM     Row Name 19 1136          Cognitive Assessment/Intervention- PT/OT    Orientation Status (Cognition)  oriented  x 4  -KM     Row Name 11/18/19 1136          Safety Issues, Functional Mobility    Safety Issues Affecting Function (Mobility)  impulsivity;insight into deficits/self awareness;judgment  -KM     Impairments Affecting Function (Mobility)  balance  -KM     Comment, Safety Issues/Impairments (Mobility)  visual deficit  -KM       User Key  (r) = Recorded By, (t) = Taken By, (c) = Cosigned By    Initials Name Provider Type    Monisha Cifuentes, PT Physical Therapist        Mobility     Row Name 11/18/19 1139          Bed Mobility Assessment/Treatment    Bed Mobility Assessment/Treatment  supine-sit;sit-supine;scooting/bridging  -KM     Scooting/Bridging Hall (Bed Mobility)  verbal cues;contact guard  -KM     Supine-Sit Hall (Bed Mobility)  verbal cues;contact guard  -KM     Sit-Supine Hall (Bed Mobility)  supervision  -KM     Row Name 11/18/19 1139          Sit-Stand Transfer    Sit-Stand Hall (Transfers)  supervision  -KM     Row Name 11/18/19 1139          Gait/Stairs Assessment/Training    Gait/Stairs Assessment/Training  gait/ambulation independence  -KM     Hall Level (Gait)  contact guard  -KM     Distance in Feet (Gait)  50  -KM     Pattern (Gait)  step-through  -KM     Deviations/Abnormal Patterns (Gait)  gait speed decreased  -KM     Bilateral Gait Deviations  weight shift ability decreased  -KM       User Key  (r) = Recorded By, (t) = Taken By, (c) = Cosigned By    Initials Name Provider Type    Monisha Cifuentes, PT Physical Therapist        Obj/Interventions     Row Name 11/18/19 1140          General ROM    GENERAL ROM COMMENTS  B l/es wfls  -KM     Row Name 11/18/19 1140          MMT (Manual Muscle Testing)    General MMT Comments  B l/es 5/5  -KM     Row Name 11/18/19 1140          Static Sitting Balance    Level of Hall (Unsupported Sitting, Static Balance)  independent  -KM     Row Name 11/18/19 1140          Dynamic Sitting Balance    Level  of [place], Reaches Outside Midline (Sitting, Dynamic Balance)  supervision  -KM     Sitting Position, Reaches Outside Midline (Sitting, Dynamic Balance)  sitting on edge of bed  -KM     Row Name 11/18/19 1140          Static Standing Balance    Level of [place] (Supported Standing, Static Balance)  standby assist  -KM     Row Name 11/18/19 1140          Dynamic Standing Balance    Level of [place], Reaches Outside Midline (Standing, Dynamic Balance)  contact guard assist  -KM     Row Name 11/18/19 1140          Sensory Assessment/Intervention    Sensory General Assessment  no sensation deficits identified  -KM       User Key  (r) = Recorded By, (t) = Taken By, (c) = Cosigned By    Initials Name Provider Type    Monisha Cifuentes, PT Physical Therapist        Goals/Plan     Row Name 11/18/19 1142          Bed Mobility Goal 1 (PT)    Activity/Assistive Device (Bed Mobility Goal 1, PT)  bed mobility activities, all  -KM     [place] Level/Cues Needed (Bed Mobility Goal 1, PT)  independent  -KM     Time Frame (Bed Mobility Goal 1, PT)  10 days  -KM     Row Name 11/18/19 1142          Transfer Goal 1 (PT)    Activity/Assistive Device (Transfer Goal 1, PT)  sit-to-stand/stand-to-sit;bed-to-chair/chair-to-bed  -KM     [place] Level/Cues Needed (Transfer Goal 1, PT)  independent  -KM     Row Name 11/18/19 1142          Gait Training Goal 1 (PT)    Activity/Assistive Device (Gait Training Goal 1, PT)  gait (walking locomotion);improve balance and speed  -KM     [place] Level (Gait Training Goal 1, PT)  independent  -KM     Distance (Gait Goal 1, PT)  100  -KM     Time Frame (Gait Training Goal 1, PT)  10 days  -KM       User Key  (r) = Recorded By, (t) = Taken By, (c) = Cosigned By    Initials Name Provider Type    Monisha Cifuentes, PT Physical Therapist        Clinical Impression     Row Name 11/18/19 1141          Pain Scale: Numbers Pre/Post-Treatment    Pain Scale: Numbers,  Pretreatment  8/10  -KM     Pain Scale: Numbers, Post-Treatment  8/10  -KM     Pain Location - Side  Right  -KM     Pain Location  eye  -KM     Row Name 11/18/19 1141          Plan of Care Review    Plan of Care Reviewed With  patient  -KM     Row Name 11/18/19 1141          Physical Therapy Clinical Impression    Patient/Family Goals Statement (PT Clinical Impression)  regain PLOF  -KM     Criteria for Skilled Interventions Met (PT Clinical Impression)  yes  -KM     Rehab Potential (PT Clinical Summary)  good, to achieve stated therapy goals  -KM     Row Name 11/18/19 1141          Positioning and Restraints    Pre-Treatment Position  in bed  -KM     Post Treatment Position  bed  -KM     In Bed  supine;call light within reach;with family/caregiver  -KM       User Key  (r) = Recorded By, (t) = Taken By, (c) = Cosigned By    Initials Name Provider Type    Monisha Cifuentes, PT Physical Therapist        Outcome Measures     Row Name 11/18/19 1143          How much help from another person do you currently need...    Turning from your back to your side while in flat bed without using bedrails?  4  -KM     Moving from lying on back to sitting on the side of a flat bed without bedrails?  3  -KM     Moving to and from a bed to a chair (including a wheelchair)?  3  -KM     Standing up from a chair using your arms (e.g., wheelchair, bedside chair)?  4  -KM     Climbing 3-5 steps with a railing?  3  -KM     To walk in hospital room?  3  -KM     AM-PAC 6 Clicks Score (PT)  20  -KM     Row Name 11/18/19 1143          Functional Assessment    Outcome Measure Options  AM-PAC 6 Clicks Basic Mobility (PT)  -KM       User Key  (r) = Recorded By, (t) = Taken By, (c) = Cosigned By    Initials Name Provider Type    Monisha Cifuentes, PT Physical Therapist        Physical Therapy Education     Title: PT OT SLP Therapies (Done)     Topic: Physical Therapy (Done)     Point: Mobility training (Done)     Learning Progress  Summary           Patient Acceptance, E, VU by  at 11/18/2019 11:43 AM    Comment:  discussed plan of care and d/c planning                   Point: Home exercise program (Done)     Learning Progress Summary           Patient Acceptance, E, VU by  at 11/18/2019 11:43 AM    Comment:  discussed plan of care and d/c planning                   Point: Body mechanics (Done)     Learning Progress Summary           Patient Acceptance, E, VU by  at 11/18/2019 11:43 AM    Comment:  discussed plan of care and d/c planning                   Point: Precautions (Done)     Learning Progress Summary           Patient Acceptance, E, VU by  at 11/18/2019 11:43 AM    Comment:  discussed plan of care and d/c planning                               User Key     Initials Effective Dates Name Provider Type Discipline     06/19/15 -  Monisha Garza, PT Physical Therapist PT              PT Recommendation and Plan  Planned Therapy Interventions (PT Eval): balance training, bed mobility training, transfer training, gait training  Outcome Summary/Treatment Plan (PT)  Anticipated Discharge Disposition (PT): home with assist  Plan of Care Reviewed With: patient  Outcome Summary: Patient presents with decrease in functional mobility from independent baseline involving higher level balance reactions. Pt to benefit from skilled svcs to assess need for assistive device , pt may benefit from st cane use dependent on progress.Recommend home with assist and outpatient follow up     Time Calculation:   PT Charges     Row Name 11/18/19 1155             Time Calculation    Start Time  1111  -KM      PT Received On  11/18/19  -KM      PT Goal Re-Cert Due Date  11/28/19  -KM         Time Calculation- PT    Total Timed Code Minutes- PT  8 minute(s)  -KM         Timed Charges    09384 - Gait Training Minutes   8  -KM        User Key  (r) = Recorded By, (t) = Taken By, (c) = Cosigned By    Initials Name Provider Type    ROSELINE Garza  Monisha ANTHONY, PT Physical Therapist        Therapy Charges for Today     Code Description Service Date Service Provider Modifiers Qty    48928597342 HC GAIT TRAINING EA 15 MIN 11/18/2019 Monisha Garza, PT GP 1    60091124466 HC PT EVAL LOW COMPLEXITY 4 11/18/2019 Monisha Garza, PT GP 1          PT G-Codes  Outcome Measure Options: AM-PAC 6 Clicks Basic Mobility (PT)  AM-PAC 6 Clicks Score (PT): 20  AM-PAC 6 Clicks Score (OT): 19    Monisha Garza, PT  11/18/2019

## 2019-11-18 NOTE — PLAN OF CARE
Problem: Patient Care Overview  Goal: Plan of Care Review  Outcome: Ongoing (interventions implemented as appropriate)   11/18/19 3981   Coping/Psychosocial   Plan of Care Reviewed With patient   OTHER   Outcome Summary Patient presents with decrease in functional mobility from independent baseline involving higher level balance reactions. Pt to benefit from skilled svcs to assess need for assistive device , pt may benefit from st cane use dependent on progress.Recommend home with assist and outpatient follow up

## 2019-11-18 NOTE — H&P
[facility] Medicine Services  HISTORY AND PHYSICAL    Patient Name: Ealrine Santacruz  : 1945  MRN: [id number]  Primary Care Physician: John Randhawa MD  Date of admission: 2019      Subjective   Subjective     Chief Complaint:  Headache    HPI:  Earline Santacruz is a 74 y.o. female patient with a PMH significant for diabetes mellitus type 2, hypertension, hyperlipidemia, CAD, history of pemphigoid who presents to the ED with complaints of headache.  Patient reports onset of right frontal/eye pain around 10 days ago.  She was seen by her PCP who prescribed ampicillin for concerns for possible sinus infection.  Her pain persisted prompting her to go to the ED at [place] on 2019.  Patient was hospitalized with discharge on 2019 where she was evaluated for severe right frontal headache and right eye pain.  Patient reports, she underwent a right temporal artery biopsy which was negative and had a negative MRI of her brain with and without contrast.  She was evaluated by neurology with discharge home.  Patient continued to have severe burning and sharp stabbing right eye and right frontal head pain with skin sensitivity.  She reports associated watering, photophobia.  She states that family noticed right eye and right lateral forehead skin rash on Saturday morning which they attributed to possible rubbing of the skin.  This morning she began to experience nausea and vomiting prompting her to come to the ED for further evaluation.  She denies fever.  She has never had a zoster vaccination but reports chickenpox as a child.          Review of Systems   Constitutional: Negative.    Eyes: Positive for photophobia, pain, discharge and visual disturbance.   Respiratory: Negative.    Cardiovascular: Negative.    Gastrointestinal: Positive for nausea and vomiting.   Endocrine: Negative.    Genitourinary: Negative.    Musculoskeletal: Negative.    Skin: Positive for  rash.   Allergic/Immunologic: Negative.    Neurological: Positive for headaches.   Hematological: Negative.    Psychiatric/Behavioral: Negative.         All other systems reviewed and are negative.     Personal History     Past Medical History:   Diagnosis Date   • Diabetes mellitus (CMS/HCC)    • Hyperlipidemia    • Hypertension    • Panic attacks        Past Surgical History:   Procedure Laterality Date   • APPENDECTOMY     • BREAST LUMPECTOMY      RIGHT    • CATARACT EXTRACTION, BILATERAL     • HAND SURGERY      LEFT    • TOTAL ABDOMINAL HYSTERECTOMY         Family History: family history includes No Known Problems in her father and mother. Otherwise pertinent FHx was reviewed and unremarkable.     Social History:  reports that she has never smoked. She has never used smokeless tobacco. She reports that she does not drink alcohol or use drugs.  Social History     Social History Narrative   • Not on file       Medications:  Available home medication information reviewed.  Medications Prior to Admission   Medication Sig Dispense Refill Last Dose   • ALPRAZolam (XANAX) 0.5 MG tablet Take 0.5 mg by mouth At Night As Needed for Anxiety.   11/16/2019 at Unknown time   • aspirin 81 MG EC tablet Take 81 mg by mouth daily  .   11/17/2019 at Unknown time   • coenzyme Q10 100 MG capsule Take 100 mg by mouth daily  .   Past Week at Unknown time   • levothyroxine (SYNTHROID, LEVOTHROID) 50 MCG tablet Take 50 mcg by mouth daily  .   11/16/2019 at Unknown time   • losartan (COZAAR) 25 MG tablet Take 12.5 mg by mouth Daily.   Taking   • metFORMIN (GLUCOPHAGE) 500 MG tablet Take 2,000 mg by mouth daily  .   Past Week at Unknown time   • pantoprazole (PROTONIX) 40 MG EC tablet Take 40 mg by mouth Daily.   11/16/2019 at Unknown time   • rosuvastatin (CRESTOR) 20 MG tablet Take 1 tablet by mouth Daily. Need lab work 90 tablet 0 Past Week at Unknown time       Allergies   Allergen Reactions   • Sulfa Antibiotics      Headaches        Objective   Objective     Vital Signs:   Temp:  [98.3 °F (36.8 °C)-98.4 °F (36.9 °C)] 98.3 °F (36.8 °C)  Heart Rate:  [60-71] 68  Resp:  [16-18] 18  BP: (125-153)/(49-88) 146/88        Physical Exam   Constitutional: Awake, alert  Eyes: PERRLA, sclerae anicteric, no conjunctival injection  HENT: NCAT, mucous membranes moist  Neck: Supple, no thyromegaly, no lymphadenopathy, trachea midline  Respiratory: Clear to auscultation bilaterally, nonlabored respirations   Cardiovascular: RRR, no murmurs, rubs, or gallops, palpable pedal pulses bilaterally  Gastrointestinal: Positive bowel sounds, soft, nontender, nondistended  Musculoskeletal: No bilateral ankle edema, no clubbing or cyanosis to extremities  Psychiatric: Appropriate affect, cooperative  Neurologic: Oriented x 3, strength symmetric in all extremities, Cranial Nerves grossly intact to confrontation, speech clear.  No nuchal rigidity  Skin: Crusted lesions to inferior and superior eyelid, skin lateral to right eyebrow.  Temporal biopsy site healing well with surgical glue intact      Results Reviewed:  I have personally reviewed current lab and radiology data.    Results from last 7 days   Lab Units 19  1256   WBC 10*3/mm3 9.90   HEMOGLOBIN g/dL 13.6   HEMATOCRIT % 39.8   PLATELETS 10*3/mm3 294     Results from last 7 days   Lab Units 19  1256   SODIUM mmol/L 140   POTASSIUM mmol/L 4.1   CHLORIDE mmol/L 101   CO2 mmol/L 29.0   BUN mg/dL 15   CREATININE mg/dL 0.64   GLUCOSE mg/dL 155*   CALCIUM mg/dL 9.0   ALT (SGPT) U/L 12   AST (SGOT) U/L 14     Estimated Creatinine Clearance: 51.4 mL/min (by C-G formula based on SCr of 0.64 mg/dL).  Brief Urine Lab Results     None        Imaging Results (Last 24 Hours)     ** No results found for the last 24 hours. **        Results for orders placed during the hospital encounter of 04/21/15   Echo - Converted    Narrative Patient:      HZANE KAUR    [facility] Rec#:     [id number]               :           1945            Date:         04/21/2015            Age:          70y                   Height:       157.48 cm / 62.0 in  Weight:       64.41 kg / 142.0 lbs  Sex:          F                     BSA:          1.65  Room#:        [facility]                      Sonographer:  Norma Mathur [facility]  Referring:    Jodie Kenyon MD  Reading:      Jodie Kenyon MD  Primary:      John ANTHONY West  ______________________________________________________________________    Transthoracic Echocardiogram    Indication:  cp,dyspnea  BP:           117/64    Conclusions  1. The estimated ejection fraction is 55-60%.   2. There is a trace of mitral regurgitation.  3. There is a trace tricuspid regurgitation.     Findings       Technical Comments:  The study quality is good.      Left Ventricle:  The left ventricular chamber size is normal. Global left ventricular  wall motion and contractility are within normal limits. There is normal  left ventricular systolic function. The estimated ejection fraction is  55-60%.      Left Atrium:  The left atrial chamber size is normal.     Right Ventricle:  The right ventricular cavity size is normal. The right ventricular  global systolic function is normal.     Right Atrium:  The right atrial cavity size is normal. No atrial septal defect is  visualized.     Aortic Valve:  The aortic valve is trileaflet. There is no evidence of aortic  regurgitation. There is no evidence of aortic stenosis.     Mitral Valve:  The mitral valve leaflets appear normal. There is no evidence of mitral  valve prolapse. There is a trace of mitral regurgitation. There is no  evidence of mitral stenosis.     Tricuspid Valve:  The tricuspid valve leaflets are normal.  There is a trace tricuspid  regurgitation.      Pulmonic Valve:  The pulmonic valve appears normal. There is a trace pulmonic  regurgitation.      Pericardium:  There is no evidence of pericardial effusion.     Aorta:  There is no dilatation of  the aortic root.     Pulmonary Artery:  The main pulmonary artery appears normal.     Venous:  The venous system appears normal.     Measurements   Chambers  Name                    Value           Ao root diameter (MM)   2.9 cm          RVIDd (AP) 2D           2.25 cm         IVSd (2D)               0.88 cm         LVIDd (2D)              4.11 cm         LVIDs (2D)              2.76 cm         LVPWd (2D)              0.79 cm         EF (2D)                 61.8 %          LA dimension (AP) 2D    3.3 cm            Volumes  Name                    Value           LV EDV SP 4CH (MOD)     39 ml           LV ESV SP 4CH (MOD)     8 ml            EF SP 4CH (MOD)         79 %              Diastolic/Systolic Function  Name                    Value           MV E-wave Vmax          0.5 m/sec       MV A-wave Vmax          0.78 m/sec      MV E:A ratio            0.6 ratio         Pulmonic Valve/Qp:Qs  Name                    Value           PV acceleration time    123 msec          Electronically signed by: Jodie Kenyon MD on 04/21/2015  17:58:52       Assessment/Plan   Assessment / Plan     Active Hospital Problems    Diagnosis POA   • Headache [R51] Yes   • Zoster [B02.9] Yes   • Type 2 diabetes mellitus (CMS/HCC) [E11.9] Yes   • Obstructive sleep apnea [G47.33] Yes     Mild - does not utilize CPAP     • Hypothyroidism [E03.9] Yes   • GERD (gastroesophageal reflux disease) [K21.9] Yes   • Dyslipidemia [E78.5] Yes     on simvastatin therapy twice a week with an HDL of 52 and LDL of 126.      • Chronic pain [G89.29] Yes   • CAD (coronary artery disease) [I25.10] Yes     a. Emergency room presentation in [facility] for chest pain.  Admitted 04/13/2015 through 04/14/2015 with normal EKG and normal troponin.  b. Cardiac catheterization, 04/21/2015, Jodie Kenyon MD, revealing significant coronary artery disease involving the second diagonal branch, which was a 0.5 mm branch in diameter and too small for  intervention.   c. Mildly elevated LVEDP and evidence of diastolic dysfunction by echocardiogram.   d. Initiation of Imdur 30 mg daily as well as aspirin 81 mg daily.             This is a 74-year-old female patient with a PMH significant for CAD, diabetes mellitus, HTN/HLD, history of pemphigoid who presents to the ED with right-sided headache and eye pain with concerns for zoster.    Headache, rash, eye pain  - Concerns for zoster  - Start valacyclovir 1 g every 8 hours for now  - Consult infectious disease, appreciate input  -Consult neurology, appreciate input  - Spoke with Dr. Foster with ophthalmology at [facility] who recommends ophthalmology evaluation tomorrow.  If ID and neuro agree, patient will likely need to be discharged in a.m. for ophthalmology eval.  - Patient on gabapentin 300 mg 3 times daily, will increase 400 mg 3 times daily.  And Dilaudid as needed for breakthrough pain  -Zofran for nausea vomiting  -Nursing order to obtain records from recent hospital stay.  Per patient report, MRI with and without contrast with no acute findings, negative temporal artery biopsy.  Evaluated by neurology.    CAD, HTN/HLD  - Continue with rosuvastatin, aspirin  - Not on antihypertensive currently    Diabetes mellitus type 2  -Hold home metformin  - Sliding scale insulin with Accu-Cheks    ESTHER  - CPAP    Hypothyroidism  -Continue home levothyroxine    DVT prophylaxis: Lovenox    CODE STATUS:    Code Status and Medical Interventions:   Ordered at: 11/17/19 2337     Code Status:    CPR     Medical Interventions (Level of Support Prior to Arrest):    Full       Admission Status:  I believe this patient meets OBSERVATION status, however if further evaluation or treatment plans warrant, status may change.  Based upon current information, I predict patient's care encounter to be less than or equal to 2 midnights.      Electronically signed by Yarely Alanis DO, 11/17/19, 11:46 PM.

## 2019-11-18 NOTE — PROGRESS NOTES
Discharge Planning Assessment  [facility]     Patient Name: Earline Santacruz  MRN: [id number]  Today's Date: 11/18/2019    Admit Date: 11/17/2019    Discharge Needs Assessment     Row Name 11/18/19 1342       Living Environment    Lives With  spouse    Name(s) of Who Lives With Patient  Ex Guillaume lives upstairs    Current Living Arrangements  home/apartment/condo    Primary Care Provided by  spouse/significant other    Provides Primary Care For  no one, unable/limited ability to care for self    Family Caregiver if Needed  spouse    Family Caregiver Names  Ex , Guillaume Santacruz lives upstairs    Quality of Family Relationships  supportive;involved;helpful    Able to Return to Prior Arrangements  yes    Living Arrangement Comments  Lives in  house with no steps, ex  lives upstairs and assists        Resource/Environmental Concerns    Resource/Environmental Concerns  none    Transportation Concerns  car, none       Transition Planning    Patient/Family Anticipates Transition to  home with family    Patient/Family Anticipated Services at Transition  none    Transportation Anticipated  family or friend will provide       Discharge Needs Assessment    Readmission Within the Last 30 Days  previous discharge plan unsuccessful    Concerns to be Addressed  denies needs/concerns at this time    Equipment Currently Used at Home  glucometer    Anticipated Changes Related to Illness  none    Equipment Needed After Discharge  none        Discharge Plan     Row Name 11/18/19 0784       Plan    Plan  Plan is home with follow up visits arranged with opthamology and infectious disease    Patient/Family in Agreement with Plan  yes    Plan Comments  Met with patient and ex  in the room for initial discharge planning.  Patient with Herpes Zoster of eye.  Seen by [facility].  Follow up appointments made with Dr. Kelly with opthamology and Dr. Hernandez with [facility].  No additional needs    Final  Discharge Disposition Code  01 - home or self-care        Destination      No service coordination in this encounter.      Durable Medical Equipment      No service coordination in this encounter.      Dialysis/Infusion      No service coordination in this encounter.      Home Medical Care      No service coordination in this encounter.      Therapy      No service coordination in this encounter.      Community Resources      No service coordination in this encounter.        Expected Discharge Date and Time     Expected Discharge Date Expected Discharge Time    Nov 18, 2019         Demographic Summary     Row Name 11/18/19 1341       General Information    Admission Type  observation    Arrived From  emergency department    Referral Source  admission list    Reason for Consult  discharge planning    Preferred Language  English        Functional Status     Row Name 11/18/19 1341       Functional Status    Usual Activity Tolerance  poor    Current Activity Tolerance  poor       Functional Status, IADL    Medications  independent    Meal Preparation  assistive person Ex , Guillaume Santacruz    Housekeeping  assistive person Ex , Guillaume Santacruz    Laundry  assistive person Ex , Guillaume Santacruz    Shopping  assistive person Ex , Guillaume Santacruz        Psychosocial    No documentation.       Abuse/Neglect    No documentation.       Legal    No documentation.       Substance Abuse    No documentation.       Patient Forms    No documentation.           Marina Rascon RN

## 2019-11-18 NOTE — DISCHARGE SUMMARY
[facility]  DISCHARGE SUMMARY    Patient Name: Earline Santacruz  : 1945  MRN: [id number]    Date of Admission: 2019 12:22 PM  Date of Discharge: 2019  Primary Care Physician: John Randhawa MD    Consults     Date and Time Order Name Status Description    2019 2338 Inpatient Neurology Consult General      2019 2338 Inpatient Infectious Diseases Consult Completed           Hospital Course     Presenting Problem:   Headache [R51]    Active Hospital Problems    Diagnosis  POA   • Headache [R51]  Yes   • Zoster [B02.9]  Yes   • Type 2 diabetes mellitus (CMS/HCC) [E11.9]  Yes   • Obstructive sleep apnea [G47.33]  Yes   • Hypothyroidism [E03.9]  Yes   • GERD (gastroesophageal reflux disease) [K21.9]  Yes   • Dyslipidemia [E78.5]  Yes   • Chronic pain [G89.29]  Yes   • CAD (coronary artery disease) [I25.10]  Yes      Resolved Hospital Problems   No resolved problems to display.          Hospital Course:  Earline Santacruz is a 74 y.o. female patient with a PMH significant for CAD, diabetes mellitus, HTN/HLD, history of pemphigoid who presents to the ED with right-sided headache and eye pain with likely zoster ophthalmicus. Patient started on TID Valtrex upon arrival and TID gabapentin for pain control. ID was consulted and I discussed the case with him. Agrees with PO Valtrex and close interval follow upon Friday in his clinic. I also discussed the case with Dr. Kelly (ophthalmology) who felt that patient did not have uveitis at this time and recommended follow up in their [facility] at 1015 tomorrow morning.     Discharge Follow Up Recommendations for labs/diagnostics:   Dr. Kelly - [facility] @ 10:15 tomorrow,    Dr. Hernandez [facility] on Friday    Day of Discharge     HPI: Up in bed with family in room. Still with facial pain and HA. No drainage from eye.    Review of Systems  Gen- No fevers, chills  CV- No chest pain,  palpitations  Resp- No cough, dyspnea  GI- No N/V/D, abd pain    Vital Signs:   Temp:  [98.3 °F (36.8 °C)-99.1 °F (37.3 °C)] 99.1 °F (37.3 °C)  Heart Rate:  [60-71] 66  Resp:  [16-18] 16  BP: (125-153)/(49-88) 131/66     Physical Exam:  Constitutional: No acute distress, awake, alert  HENT: NCAT, mucous membranes moist, no eye redness, able to read names on board across room, no visible drainage  Face: 2 separate crusted lesions over right eyelid and cheek  Respiratory: Clear to auscultation bilaterally, respiratory effort normal   Cardiovascular: RRR, no murmurs, rubs, or gallops, palpable pedal pulses bilaterally  Gastrointestinal: Positive bowel sounds, soft, nontender, nondistended  Musculoskeletal: No bilateral ankle edema  Psychiatric: Appropriate affect, cooperative  Neurologic: Oriented x 3, strength symmetric in all extremities, Cranial Nerves grossly intact to confrontation, speech clear  Skin: No rashes    Pertinent  and/or Most Recent Results     Results from last 7 days   Lab Units 11/18/19  0657 11/17/19  1256   WBC 10*3/mm3 10.12 9.90   HEMOGLOBIN g/dL 12.9 13.6   HEMATOCRIT % 38.6 39.8   PLATELETS 10*3/mm3 297 294   SODIUM mmol/L 143 140   POTASSIUM mmol/L 3.6 4.1   CHLORIDE mmol/L 106 101   CO2 mmol/L 24.0 29.0   BUN mg/dL 8 15   CREATININE mg/dL 0.53* 0.64   GLUCOSE mg/dL 128* 155*   CALCIUM mg/dL 8.6 9.0     Results from last 7 days   Lab Units 11/17/19  1256   BILIRUBIN mg/dL 0.4   ALK PHOS U/L 51   ALT (SGPT) U/L 12   AST (SGOT) U/L 14           Invalid input(s): TG, LDLCALC, LDLREALC  Results from last 7 days   Lab Units 11/18/19  0657   TSH uIU/mL 1.200   HEMOGLOBIN A1C % 5.50       Brief Urine Lab Results     None          Microbiology Results Abnormal     None          Imaging Results (All)     None          Results for orders placed during the hospital encounter of 08/22/18   Duplex Carotid Ultrasound CAR    Narrative · Right internal carotid artery stenosis of 0-49%.  · Left internal  carotid artery stenosis of 0-49%.  · Minimal carotid atheroma bilaterally.          Results for orders placed during the hospital encounter of 18   Duplex Carotid Ultrasound CAR    Narrative · Right internal carotid artery stenosis of 0-49%.  · Left internal carotid artery stenosis of 0-49%.  · Minimal carotid atheroma bilaterally.          Results for orders placed during the hospital encounter of 04/21/15   Echo - Converted    Narrative Patient:      ZHANE KAUR    [facility] Rec#:     [id number]               :          1945            Date:         2015            Age:          70y                   Height:       157.48 cm / 62.0 in  Weight:       64.41 kg / 142.0 lbs  Sex:          F                     BSA:          1.65  Room#:        [facility]                      Sonographer:  Norma Mathur [facility]  Referring:    Jodie Kenyon MD  Reading:      Jodie Kenyon MD  Primary:      John Randhawa  ______________________________________________________________________    Transthoracic Echocardiogram    Indication:  cp,dyspnea  BP:           117/64    Conclusions  1. The estimated ejection fraction is 55-60%.   2. There is a trace of mitral regurgitation.  3. There is a trace tricuspid regurgitation.     Findings       Technical Comments:  The study quality is good.      Left Ventricle:  The left ventricular chamber size is normal. Global left ventricular  wall motion and contractility are within normal limits. There is normal  left ventricular systolic function. The estimated ejection fraction is  55-60%.      Left Atrium:  The left atrial chamber size is normal.     Right Ventricle:  The right ventricular cavity size is normal. The right ventricular  global systolic function is normal.     Right Atrium:  The right atrial cavity size is normal. No atrial septal defect is  visualized.     Aortic Valve:  The aortic valve is trileaflet. There is no evidence of aortic  regurgitation. There  is no evidence of aortic stenosis.     Mitral Valve:  The mitral valve leaflets appear normal. There is no evidence of mitral  valve prolapse. There is a trace of mitral regurgitation. There is no  evidence of mitral stenosis.     Tricuspid Valve:  The tricuspid valve leaflets are normal.  There is a trace tricuspid  regurgitation.      Pulmonic Valve:  The pulmonic valve appears normal. There is a trace pulmonic  regurgitation.      Pericardium:  There is no evidence of pericardial effusion.     Aorta:  There is no dilatation of the aortic root.     Pulmonary Artery:  The main pulmonary artery appears normal.     Venous:  The venous system appears normal.     Measurements   Chambers  Name                    Value           Ao root diameter (MM)   2.9 cm          RVIDd (AP) 2D           2.25 cm         IVSd (2D)               0.88 cm         LVIDd (2D)              4.11 cm         LVIDs (2D)              2.76 cm         LVPWd (2D)              0.79 cm         EF (2D)                 61.8 %          LA dimension (AP) 2D    3.3 cm            Volumes  Name                    Value           LV EDV SP 4CH (MOD)     39 ml           LV ESV SP 4CH (MOD)     8 ml            EF SP 4CH (MOD)         79 %              Diastolic/Systolic Function  Name                    Value           MV E-wave Vmax          0.5 m/sec       MV A-wave Vmax          0.78 m/sec      MV E:A ratio            0.6 ratio         Pulmonic Valve/Qp:Qs  Name                    Value           PV acceleration time    123 msec          Electronically signed by: Jodie Kenyon MD on 04/21/2015  17:58:52         Discharge Details        Discharge Medications      New Medications      Instructions Start Date   gabapentin 400 MG capsule  Commonly known as:  NEURONTIN   400 mg, Oral, 3 Times Daily      valACYclovir 1000 MG tablet  Commonly known as:  VALTREX   1,000 mg, Oral, 3 Times Daily         Continue These Medications      Instructions Start Date    ALPRAZolam 0.5 MG tablet  Commonly known as:  XANAX   0.5 mg, Oral, Nightly PRN      aspirin 81 MG EC tablet   81 mg, Oral, Daily      coenzyme Q10 100 MG capsule   100 mg, Oral, Daily      levothyroxine 50 MCG tablet  Commonly known as:  SYNTHROID, LEVOTHROID   50 mcg, Oral, Daily      losartan 25 MG tablet  Commonly known as:  COZAAR   12.5 mg, Oral, Daily      metFORMIN 500 MG tablet  Commonly known as:  GLUCOPHAGE   2,000 mg, Oral, Daily      pantoprazole 40 MG EC tablet  Commonly known as:  PROTONIX   40 mg, Oral, Daily      rosuvastatin 20 MG tablet  Commonly known as:  CRESTOR   20 mg, Oral, Daily, Need lab work             Allergies   Allergen Reactions   • Sulfa Antibiotics      Headaches         Discharge Disposition:  Home or Self Care    Diet:  Hospital:  Diet Order   Procedures   • Diet Clear Liquid; Consistent Carbohydrate       Activity:  Activity Instructions     Activity as tolerated                      CODE STATUS:    Code Status and Medical Interventions:   Ordered at: 11/17/19 2337     Code Status:    CPR     Medical Interventions (Level of Support Prior to Arrest):    Full       Future Appointments   Date Time Provider Department Center   10/29/2020 10:45 AM Jodie Kenyon MD [facility] None       Additional Instructions for the Follow-ups that You Need to Schedule     Discharge Follow-up with Specialty: Ophthalmology - [facility]; 1 Day   As directed      Specialty:  Ophthalmology [facility]    Follow Up:  1 Day    Follow Up Details:  Tomorrow @ 10:15               Time Spent on Discharge: 48 minutes    Electronically signed by Mell Pascal II, DO, 11/18/19, 11:40 AM.

## 2019-11-18 NOTE — PLAN OF CARE
Problem: Patient Care Overview  Goal: Plan of Care Review  Outcome: Ongoing (interventions implemented as appropriate)   11/18/19 0417   Coping/Psychosocial   Plan of Care Reviewed With patient   Plan of Care Review   Progress no change   OTHER   Outcome Summary Pt admitted to floor. Moved from 207 to 219 for negative pressure room to implement airborne precautions, due to possible shingles in the R eye. Pt has minimal complaints of pain, states it feels like pressure. Requires 1 person assist, pt unsteady on feet. Will continue to monitor.        Problem: Fall Risk (Adult)  Goal: Identify Related Risk Factors and Signs and Symptoms  Outcome: Ongoing (interventions implemented as appropriate)   11/18/19 0417   Fall Risk (Adult)   Related Risk Factors (Fall Risk) fatigue/slow reaction;gait/mobility problems   Signs and Symptoms (Fall Risk) presence of risk factors

## 2019-11-18 NOTE — PLAN OF CARE
Problem: Patient Care Overview  Goal: Plan of Care Review  Outcome: Ongoing (interventions implemented as appropriate)   11/18/19 1920   Coping/Psychosocial   Plan of Care Reviewed With patient   OTHER   Outcome Summary Pt presents w/painful, blurry R eye, impacting depth perception and reading. Pt also exhibits decline in balance and fxnl task with ADLs d/t fatigue impacting overall safety. Recommend pt d/c home with HHPT and appropriate AD as recommended by physical therapist to improve balance. OT to follow IP to address ADL independence/safety, visual compensatory strategies, and AE/DME needs.

## 2020-10-29 ENCOUNTER — OFFICE VISIT (OUTPATIENT)
Dept: CARDIOLOGY | Facility: CLINIC | Age: 75
End: 2020-10-29

## 2020-10-29 VITALS
SYSTOLIC BLOOD PRESSURE: 102 MMHG | DIASTOLIC BLOOD PRESSURE: 54 MMHG | OXYGEN SATURATION: 98 % | HEIGHT: 61 IN | BODY MASS INDEX: 23.6 KG/M2 | WEIGHT: 125 LBS | HEART RATE: 65 BPM

## 2020-10-29 DIAGNOSIS — I10 ESSENTIAL HYPERTENSION: ICD-10-CM

## 2020-10-29 DIAGNOSIS — E78.5 DYSLIPIDEMIA: ICD-10-CM

## 2020-10-29 DIAGNOSIS — I25.10 CORONARY ARTERY DISEASE INVOLVING NATIVE CORONARY ARTERY OF NATIVE HEART WITHOUT ANGINA PECTORIS: Primary | ICD-10-CM

## 2020-10-29 PROCEDURE — 99214 OFFICE O/P EST MOD 30 MIN: CPT | Performed by: INTERNAL MEDICINE

## 2020-10-29 NOTE — PROGRESS NOTES
[facility] Cardiology    Patient ID: Earline Santacruz is a 75 y.o. female.  : 1945   Contact: [phone]    Encounter date: 10/29/2020    PCP: John Randhawa MD      Chief complaint:   Chief Complaint   Patient presents with   • Coronary artery disease involving native coronary artery of      Problem List:  1. Coronary Artery Disease:  a. Emergency room presentation in [facility] for chest pain. Admitted (2015) through (2015) with normal EKG and normal troponin.  b. Cardiac catheterization, (2015), Jodie Kenyon MD, revealing significant coronary artery disease involving the second diagonal branch, which was a 0.5 mm branch in diameter and too small for intervention.   c. Mildly elevated LVEDP and evidence of diastolic dysfunction by echocardiogram.   d. Initiation of Imdur 30 mg daily as well as aspirin 81 mg daily.   2. Prior CVA  a. Holter monitor 2018: SR/ST/PAC  b. Caroid duplex 2018: Right internal carotid artery stenosis of 0-49%. Left internal carotid artery stenosis of 0-49%. Minimal carotid atheroma bilaterally.  3. Fatigue and muscle weakness, recent onset.  4. Family history of early cardiac death.   5. Dyslipidemia.   6. Type 2 Diabetes Mellitus.  7. GERD.   8. Hypothyroidism.   9. Chronic pain.  10. Panic attacks.   11. Pemphigoid.   12. Mild obstructive sleep apnea; does not utilize CPAP.  13. Surgical history:  a. Cataract surgery bilaterally.  b. TODD.   c. Right lumpectomy.  d. Left hand surgery.     Allergies   Allergen Reactions   • Sulfa Antibiotics      Headaches       Current Medications:    Current Outpatient Medications:   •  ALPRAZolam (XANAX) 0.5 MG tablet, Take 0.5 mg by mouth At Night As Needed for Anxiety., Disp: , Rfl:   •  aspirin 81 MG EC tablet, Take 81 mg by mouth daily ., Disp: , Rfl:   •  coenzyme Q10 100 MG capsule, Take 100 mg by mouth daily ., Disp: , Rfl:   •  gabapentin (NEURONTIN) 400 MG  "capsule, Take 1 capsule by mouth 3 (Three) Times a Day., Disp: 21 capsule, Rfl: 0  •  levothyroxine (SYNTHROID, LEVOTHROID) 50 MCG tablet, Take 50 mcg by mouth daily ., Disp: , Rfl:   •  losartan (COZAAR) 25 MG tablet, Take 12.5 mg by mouth Daily., Disp: , Rfl:   •  metFORMIN (GLUCOPHAGE) 500 MG tablet, Take 2,000 mg by mouth daily ., Disp: , Rfl:   •  pantoprazole (PROTONIX) 40 MG EC tablet, Take 40 mg by mouth Daily., Disp: , Rfl:   •  rosuvastatin (CRESTOR) 20 MG tablet, Take 1 tablet by mouth Daily. Need lab work, Disp: 90 tablet, Rfl: 0    HPI    Earline Santacruz is a 75 y.o. female who presents today for an annual follow up of CAD and dyslipidemia. Since last visit, patient has been doing great. Her cholesterol was recently taken with PCP and she started taking Crestor QOD. She is due for new labs in a couple of months. No chest pain, SOB, PND, LLE, orthopnea.     The following portions of the patient's history were reviewed and updated as appropriate: allergies, current medications and problem list.    Pertinent positives as listed in the HPI.  All other systems reviewed are negative.         Vitals:    10/29/20 1049   BP: 102/54   BP Location: Left arm   Patient Position: Sitting   Pulse: 65   SpO2: 98%   Weight: 56.7 kg (125 lb)   Height: 154.9 cm (61\")       Physical Exam:  General: Alert and oriented.  Neck: Jugular venous pressure is within normal limits. Carotids have normal upstrokes without bruits.   Cardiovascular: Heart has a nondisplaced focal PMI. Regular rate and rhythm. No murmur, gallop or rub.  Lungs: Clear, no rales or wheezes. Equal expansion is noted.   Extremities: Show no edema.  Skin: Warm and dry.  Neurologic: Nonfocal.     Diagnostic Data (reviewed with patient):  Lab Results   Component Value Date    GLUCOSE 128 (H) 11/18/2019    BUN 8 11/18/2019    CREATININE 0.53 (L) 11/18/2019    EGFRIFNONA 113 11/18/2019    BCR 15.1 11/18/2019     11/18/2019    K 3.6 11/18/2019     " 11/18/2019    CO2 24.0 11/18/2019    CALCIUM 8.6 11/18/2019    ALBUMIN 4.00 11/17/2019    ALKPHOS 51 11/17/2019    AST 14 11/17/2019    ALT 12 11/17/2019     Lab Results   Component Value Date    WBC 10.12 11/18/2019    RBC 4.16 11/18/2019    HGB 12.9 11/18/2019    HCT 38.6 11/18/2019    MCV 92.8 11/18/2019     11/18/2019      Lab Results   Component Value Date    TSH 1.200 11/18/2019        Procedures      Assessment:    ICD-10-CM ICD-9-CM   1. Coronary artery disease involving native coronary artery of native heart without angina pectoris  I25.10 414.01   2. Essential hypertension  I10 401.9   3. Dyslipidemia  E78.5 272.4         Plan:  1. Continue statin for HLD.   2. Continue ASA, statin for CAD  3. Will get copy of FLP from PCP.   4. Patient was counseled to begin aerobic exercise 30 min per day for at least 4 days per week.   5. Continue all other current medications.  6. F/up in 12 months, sooner if needed.      Electronically signed by NESHA Longo, 10/29/20, 11:03 AM EDT.

## 2022-08-02 ENCOUNTER — OFFICE VISIT (OUTPATIENT)
Dept: FAMILY MEDICINE CLINIC | Facility: CLINIC | Age: 77
End: 2022-08-02

## 2022-08-02 VITALS
DIASTOLIC BLOOD PRESSURE: 70 MMHG | OXYGEN SATURATION: 99 % | HEIGHT: 62 IN | RESPIRATION RATE: 12 BRPM | BODY MASS INDEX: 24.59 KG/M2 | HEART RATE: 83 BPM | SYSTOLIC BLOOD PRESSURE: 113 MMHG | TEMPERATURE: 98.2 F | WEIGHT: 133.6 LBS

## 2022-08-02 DIAGNOSIS — E11.42 TYPE 2 DIABETES MELLITUS WITH DIABETIC POLYNEUROPATHY, WITHOUT LONG-TERM CURRENT USE OF INSULIN: ICD-10-CM

## 2022-08-02 DIAGNOSIS — G51.0 FACIAL PARALYSIS/BELLS PALSY: Primary | ICD-10-CM

## 2022-08-02 PROCEDURE — 99213 OFFICE O/P EST LOW 20 MIN: CPT | Performed by: INTERNAL MEDICINE

## 2022-08-02 RX ORDER — GABAPENTIN 300 MG/1
300 CAPSULE ORAL 3 TIMES DAILY
COMMUNITY
Start: 2022-07-22 | End: 2022-09-30 | Stop reason: SDUPTHER

## 2022-08-02 RX ORDER — VALACYCLOVIR HYDROCHLORIDE 1 G/1
1000 TABLET, FILM COATED ORAL 3 TIMES DAILY
Qty: 21 TABLET | Refills: 0 | Status: SHIPPED | OUTPATIENT
Start: 2022-08-02 | End: 2022-08-09

## 2022-08-02 RX ORDER — PREDNISONE 20 MG/1
TABLET ORAL
Qty: 21 TABLET | Refills: 0 | Status: SHIPPED | OUTPATIENT
Start: 2022-08-02 | End: 2022-10-31

## 2022-08-02 RX ORDER — CONJUGATED ESTROGENS 0.62 MG/G
0.62 CREAM VAGINAL 2 TIMES DAILY PRN
COMMUNITY
Start: 2022-05-12 | End: 2023-03-23

## 2022-08-02 NOTE — PROGRESS NOTES
Follow Up Office Visit      Date: 2022   Patient Name: Earline Santacruz  : 1945   MRN: [id number]     Chief Complaint:    Chief Complaint   Patient presents with   • pain in eye   • Ear Drainage     Opthamoligist says she has bells palsy   • Headache       History of Present Illness: Earline Santacruz is a 77 y.o. female who is here today upon referral from ophthalmologist Dr. Wisdom, having presented there today with a 2-day history of burning in her left eye with some itch, and progressive left-sided facial weakness without numbness associate with a headache.  Dr. Wisdom felt that she had a diagnosis of Bell's palsy and referred her here for definitive treatment.  Patient has normal sense of taste, no focal weakness or numbness elsewhere.  Has never had a similar episode in the past.  No fevers or chills.  Also diabetic, historically having good control with hemoglobin A1c recently of 6.9%, hypertensive with blood pressures well controlled in the 110s over 60s to 70s.    Subjective      Review of Systems:   Review of Systems    I have reviewed the patients family history, social history, past medical history, past surgical history and have updated it as appropriate.     Medications:     Current Outpatient Medications:   •  ALPRAZolam (XANAX) 0.5 MG tablet, Take 0.5 mg by mouth At Night As Needed for Anxiety., Disp: , Rfl:   •  aspirin 81 MG EC tablet, Take 81 mg by mouth daily ., Disp: , Rfl:   •  coenzyme Q10 100 MG capsule, Take 100 mg by mouth daily ., Disp: , Rfl:   •  levothyroxine (SYNTHROID, LEVOTHROID) 50 MCG tablet, Take 50 mcg by mouth daily ., Disp: , Rfl:   •  losartan (COZAAR) 25 MG tablet, Take 12.5 mg by mouth Daily., Disp: , Rfl:   •  metFORMIN (GLUCOPHAGE) 500 MG tablet, Take 2,000 mg by mouth daily ., Disp: , Rfl:   •  rosuvastatin (CRESTOR) 20 MG tablet, Take 1 tablet by mouth Daily. Need lab work, Disp: 90 tablet, Rfl: 0  •  gabapentin (NEURONTIN) 300 MG capsule, Take 300 mg  "by mouth 3 (Three) Times a Day., Disp: , Rfl:   •  gabapentin (NEURONTIN) 400 MG capsule, Take 1 capsule by mouth 3 (Three) Times a Day. (Patient taking differently: Take 300 mg by mouth 3 (Three) Times a Day.), Disp: 21 capsule, Rfl: 0  •  pantoprazole (PROTONIX) 40 MG EC tablet, Take 40 mg by mouth Daily., Disp: , Rfl:   •  predniSONE (DELTASONE) 20 MG tablet, 3 tablets once daily x7 days, Disp: 21 tablet, Rfl: 0  •  Premarin 0.625 MG/GM vaginal cream, Insert 0.625 mg into the vagina 2 (Two) Times a Day As Needed., Disp: , Rfl:   •  valACYclovir (Valtrex) 1000 MG tablet, Take 1 tablet by mouth 3 (Three) Times a Day for 7 days., Disp: 21 tablet, Rfl: 0    Allergies:   Allergies   Allergen Reactions   • Sulfa Antibiotics Headache     Headaches       Objective     Physical Exam: Please see above  Vital Signs:   Vitals:    08/02/22 1350   BP: 113/70   Pulse: 83   Resp: 12   Temp: 98.2 °F (36.8 °C)   SpO2: 99%   Weight: 60.6 kg (133 lb 9.6 oz)   Height: 157.5 cm (62\")     Body mass index is 24.44 kg/m².  BMI is within normal parameters. No other follow-up for BMI required.       Physical Exam  General: Pleasant elderly white female who is in no acute distress.  She is upset understandably given some left-sided facial weakness.  Head and neck: Clear weakness of the left side of her face including her periorbital region and lower face, able to close her eye with great effort on the left, lopsided smile, EOMI, vision intact, conjunctive a with minimal irritation but no discharge, no proptosis, TMs and canals bilaterally clear with hearing grossly intact, nares is clear, oropharynx is clear with tongue midline, neck supple with no masses or tenderness, normal sensation in her face  Lungs clear  Cardiac regular rate rhythm with no murmurs gallops rubs  Neurological exam with left-sided facial weakness is noted consistent with House-Brackman stage III, extremity motor and sensory exam normal, gait " normal  Procedures    Results:   Labs:   Hemoglobin A1C   Date Value Ref Range Status   11/18/2019 5.50 4.80 - 5.60 % Final     TSH   Date Value Ref Range Status   11/18/2019 1.200 0.270 - 4.200 uIU/mL Final        Imaging:   No valid procedures specified.     Assessment / Plan      Assessment/Plan:   Diagnoses and all orders for this visit:    1. Facial paralysis/Matheson palsy (Primary)  -     predniSONE (DELTASONE) 20 MG tablet; 3 tablets once daily x7 days  Dispense: 21 tablet; Refill: 0  -     valACYclovir (Valtrex) 1000 MG tablet; Take 1 tablet by mouth 3 (Three) Times a Day for 7 days.  Dispense: 21 tablet; Refill: 0  Left-sided Bell's palsy, House-Brackman class III.  Present for 2 days.  Given overall satisfactory diabetic control, will treat aggressively with prednisone 60 mg daily x7 days, and Valtrex 1 g 3 times daily x7 days noting she has normal renal function.  Advised will take many weeks for probable clinical improvement but ultimately good long-term prognosis.  She has been advised to tape her eye shut at nighttime to avoid dryness and to use eye gel or drops for lubrication.    2. Type 2 diabetes mellitus with diabetic polyneuropathy, without long-term current use of insulin (HCC)    Good control historically with recent hemoglobin A1c of 6.9% on metformin, also on ARB.    Follow Up:   Return in about 1 week (around 8/9/2022) for Recheck.    I  At [facility], we believe that sharing information builds trust and better relationships. You are receiving this note because you recently visited [facility]. It is possible you will see health information before a provider has talked with you about it. This kind of information can be easy to misunderstand. To help you fully understand what it means for your health, we urge you to discuss this note with your provider.    Manjinder Randhawa MD  [facility]

## 2022-08-09 ENCOUNTER — OFFICE VISIT (OUTPATIENT)
Dept: FAMILY MEDICINE CLINIC | Facility: CLINIC | Age: 77
End: 2022-08-09

## 2022-08-09 VITALS
OXYGEN SATURATION: 96 % | WEIGHT: 132.6 LBS | DIASTOLIC BLOOD PRESSURE: 66 MMHG | RESPIRATION RATE: 12 BRPM | BODY MASS INDEX: 24.4 KG/M2 | TEMPERATURE: 97.5 F | HEIGHT: 62 IN | SYSTOLIC BLOOD PRESSURE: 116 MMHG | HEART RATE: 78 BPM

## 2022-08-09 DIAGNOSIS — G51.0 FACIAL PARALYSIS/BELLS PALSY: Primary | ICD-10-CM

## 2022-08-09 PROCEDURE — 99213 OFFICE O/P EST LOW 20 MIN: CPT | Performed by: INTERNAL MEDICINE

## 2022-08-09 NOTE — PROGRESS NOTES
Follow Up Office Visit      Date: 2022   Patient Name: Earline Santacruz  : 1945   MRN: [id number]     Chief Complaint:    Chief Complaint   Patient presents with   • Follow-up       History of Present Illness: Earline Santacruz is a 77 y.o. female who is here today for follow-up of Bell's palsy, House-Brackman stage III, assessed 1 week ago, now 9 days after treatment.  She was referred here by Dr. Wisdom of ophthalmology.  Her biggest complaint is left eye discomfort and some blurring of her vision which is unchanged from when she had seen Dr. Wisdom.  She also feels like the weakness on the left side of her face is stable, not any better or worse.  She is completing her course of Valtrex and prednisone, noting the prednisone did cause her blood sugars to go as high as 500, historically having good control with a recent hemoglobin A1c of 6.9%, but now off the prednisone her blood sugar this morning was in the 120 range.  No other acute problems.    Subjective      Review of Systems:   Review of Systems    I have reviewed the patients family history, social history, past medical history, past surgical history and have updated it as appropriate.     Medications:     Current Outpatient Medications:   •  ALPRAZolam (XANAX) 0.5 MG tablet, Take 0.5 mg by mouth At Night As Needed for Anxiety., Disp: , Rfl:   •  aspirin 81 MG EC tablet, Take 81 mg by mouth daily ., Disp: , Rfl:   •  gabapentin (NEURONTIN) 300 MG capsule, Take 300 mg by mouth 3 (Three) Times a Day., Disp: , Rfl:   •  levothyroxine (SYNTHROID, LEVOTHROID) 50 MCG tablet, Take 50 mcg by mouth daily ., Disp: , Rfl:   •  losartan (COZAAR) 25 MG tablet, Take 12.5 mg by mouth Daily., Disp: , Rfl:   •  metFORMIN (GLUCOPHAGE) 500 MG tablet, Take 2,000 mg by mouth daily ., Disp: , Rfl:   •  predniSONE (DELTASONE) 20 MG tablet, 3 tablets once daily x7 days, Disp: 21 tablet, Rfl: 0  •  rosuvastatin (CRESTOR) 20 MG tablet, Take 1 tablet by mouth Daily.  "Need lab work, Disp: 90 tablet, Rfl: 0  •  valACYclovir (Valtrex) 1000 MG tablet, Take 1 tablet by mouth 3 (Three) Times a Day for 7 days., Disp: 21 tablet, Rfl: 0  •  coenzyme Q10 100 MG capsule, Take 100 mg by mouth daily ., Disp: , Rfl:   •  gabapentin (NEURONTIN) 400 MG capsule, Take 1 capsule by mouth 3 (Three) Times a Day. (Patient taking differently: Take 300 mg by mouth 3 (Three) Times a Day.), Disp: 21 capsule, Rfl: 0  •  pantoprazole (PROTONIX) 40 MG EC tablet, Take 40 mg by mouth Daily., Disp: , Rfl:   •  Premarin 0.625 MG/GM vaginal cream, Insert 0.625 mg into the vagina 2 (Two) Times a Day As Needed., Disp: , Rfl:     Allergies:   Allergies   Allergen Reactions   • Sulfa Antibiotics Headache     Headaches       Objective     Physical Exam: Please see above  Vital Signs:   Vitals:    08/09/22 1502   BP: 116/66   Pulse: 78   Resp: 12   Temp: 97.5 °F (36.4 °C)   SpO2: 96%   Weight: 60.1 kg (132 lb 9.6 oz)   Height: 157.5 cm (62\")     Body mass index is 24.25 kg/m².  BMI is within normal parameters. No other follow-up for BMI required.       Physical Exam  General: Pleasant elderly white female who is in no acute distress.  Head neck: Obvious left-sided facial weakness, involving both her upper and lower face, with incomplete closure of her eye with normal effort, able to close her eye completely with significant effort, having moderate weakness on the right lower face, consistent with House-Brackman stage III.  Pupils are equally round reactive to light, fundi exam is clear, vision subjectively blurry on the left relative to the right, conjunctive appear clear bilaterally, EOMI, no diplopia, TMs and canals bilaterally clear, nares clear, oropharynx with some right-sided facial pulling given left-sided weakness, but otherwise clear, neck supple with no masses or tenderness  Neurological exam other than left-sided facial weakness is grossly normal  Procedures    Results:   Labs:   Hemoglobin A1C   Date Value " Ref Range Status   11/18/2019 5.50 4.80 - 5.60 % Final     TSH   Date Value Ref Range Status   11/18/2019 1.200 0.270 - 4.200 uIU/mL Final        Imaging:   No valid procedures specified.     Assessment / Plan      Assessment/Plan:   Diagnoses and all orders for this visit:    1. Facial paralysis/Carlton palsy (Primary)  Left-sided Bell's palsy.  Completing course of prednisone and Valtrex.  House-Brackman stage III.  Continue lubricating her left eye with drops during the daytime and gel at nighttime, taping her left eye shut.  She does have subjective eye discomfort with no objective findings noted, also some subjective change in her vision on the left.  She is scheduled to see Dr. Wisdom of ophthalmology in 2 days.  Keep this appointment.  I did discuss with patient slow anticipated recovery, though at least 80 to 85% of people ultimately have complete or near complete resolution of symptoms after several months.  We will reassess clinically in the next couple weeks to ensure she is stable and hopefully having some improvement.      Follow Up:   Return in about 2 weeks (around 8/23/2022) for Recheck.      At [facility], we believe that sharing information builds trust and better relationships. You are receiving this note because you recently visited [facility]. It is possible you will see health information before a provider has talked with you about it. This kind of information can be easy to misunderstand. To help you fully understand what it means for your health, we urge you to discuss this note with your provider.    Manjinder Randhawa MD  [facility]

## 2022-08-10 ENCOUNTER — TELEPHONE (OUTPATIENT)
Dept: FAMILY MEDICINE CLINIC | Facility: CLINIC | Age: 77
End: 2022-08-10

## 2022-08-10 NOTE — TELEPHONE ENCOUNTER
Caller: Earline Santacruz    Relationship: Self    Best call back number:     [phone]    What medication are you concerned about:    predniSONE (DELTASONE) 20 MG tablet    Who prescribed you this medication:     DR KHARI VASQUEZ    What are your concerns:     PATIENT STATED SHE THOUGHT THE PREDNISONE STEROID WAS SUPPOSED TO DECREASE AS TAKEN OVER A PERIOD OF TIME    PATIENT STATED THE DOSAGE HAS REMAINED THE SAME    PATIENT REQUESTED A CALL BACK FROM DR KHARI VASQUEZ TO GIVE RECOMMENDATIONS AND NEXT STEPS IN USING THE STEROID

## 2022-08-10 NOTE — TELEPHONE ENCOUNTER
Please advise Earline that the current protocol of prednisone she is taking is simply a standard dose for 7 days then stop.  Thanks

## 2022-08-25 ENCOUNTER — TELEPHONE (OUTPATIENT)
Dept: FAMILY MEDICINE CLINIC | Facility: CLINIC | Age: 77
End: 2022-08-25

## 2022-08-25 ENCOUNTER — TELEMEDICINE (OUTPATIENT)
Dept: FAMILY MEDICINE CLINIC | Facility: CLINIC | Age: 77
End: 2022-08-25

## 2022-08-25 DIAGNOSIS — U07.1 CLINICAL DIAGNOSIS OF SEVERE ACUTE RESPIRATORY SYNDROME CORONAVIRUS 2 (SARS-COV-2) DISEASE: Primary | ICD-10-CM

## 2022-08-25 PROCEDURE — 99213 OFFICE O/P EST LOW 20 MIN: CPT | Performed by: INTERNAL MEDICINE

## 2022-08-25 NOTE — PROGRESS NOTES
Telehealth E-Visit      Date: 2022   Patient Name: Earline Santacruz  : 1945   MRN: [id number]     Chief Complaint:  No chief complaint on file.      I have reviewed the E-Visit questionnaire and the patient's answers, my assessment and plan are listed below.     This provider is located at the [facility] (through [facility]), [address] [place] [address] using a secure [employer] Video Visit through [employer]. Patient is being seen remotely via telehealth at their home address in [place], and stated they are in a secure environment for this session. The patient's condition being diagnosed/treated is appropriate for telemedicine. The provider identified herself as well as her credentials. The patient, and/or patients guardian, consent to be seen remotely, and when consent is given they understand that the consent allows for patient identifiable information to be sent to a third party as needed. They may refuse to be seen remotely at any time. The electronic data is encrypted and password protected, and the patient and/or guardian has been advised of the potential risks to privacy not withstanding such measures.    You have chosen to receive care through a telehealth visit. Do you consent to use a video/audio connection for your medical care today? Yes    History of Present Illness: Earline Santacruz is a 77 y.o. female who is here today evaluated via video telemedicine to follow up with diagnosis earlier today at an urgent treatment center with COVID-19, having developed onset yesterday of nasal congestion cough rhinorrhea hoarseness headache earache decreased appetite with malaise but no fevers or chills.  No vomiting or diarrhea.  Not vaccinated against COVID-19.  Recent diagnosis of Bell's palsy, having completed Valtrex and prednisone regimen.  She is getting improvement in her left facial weakness using acupuncture.      Subjective      Review of Systems:    Review of Systems    I have reviewed and the following portions of the patient's history were updated as appropriate: past family history, past medical history, past social history, past surgical history and problem list.    Medications:     Current Outpatient Medications:   •  ALPRAZolam (XANAX) 0.5 MG tablet, Take 0.5 mg by mouth At Night As Needed for Anxiety., Disp: , Rfl:   •  aspirin 81 MG EC tablet, Take 81 mg by mouth daily ., Disp: , Rfl:   •  coenzyme Q10 100 MG capsule, Take 100 mg by mouth daily ., Disp: , Rfl:   •  gabapentin (NEURONTIN) 300 MG capsule, Take 300 mg by mouth 3 (Three) Times a Day., Disp: , Rfl:   •  gabapentin (NEURONTIN) 400 MG capsule, Take 1 capsule by mouth 3 (Three) Times a Day. (Patient taking differently: Take 300 mg by mouth 3 (Three) Times a Day.), Disp: 21 capsule, Rfl: 0  •  levothyroxine (SYNTHROID, LEVOTHROID) 50 MCG tablet, Take 50 mcg by mouth daily ., Disp: , Rfl:   •  losartan (COZAAR) 25 MG tablet, Take 12.5 mg by mouth Daily., Disp: , Rfl:   •  metFORMIN (GLUCOPHAGE) 500 MG tablet, Take 2,000 mg by mouth daily ., Disp: , Rfl:   •  Nirmatrelvir&Ritonavir 300/100 (PAXLOVID) 20 x 150 MG & 10 x 100MG tablet therapy pack tablet, Take 3 tablets by mouth 2 (Two) Times a Day for 5 days. Discontinue rosuvastatin while taking this medication, Disp: 30 tablet, Rfl: 0  •  pantoprazole (PROTONIX) 40 MG EC tablet, Take 40 mg by mouth Daily., Disp: , Rfl:   •  predniSONE (DELTASONE) 20 MG tablet, 3 tablets once daily x7 days, Disp: 21 tablet, Rfl: 0  •  Premarin 0.625 MG/GM vaginal cream, Insert 0.625 mg into the vagina 2 (Two) Times a Day As Needed., Disp: , Rfl:   •  rosuvastatin (CRESTOR) 20 MG tablet, Take 1 tablet by mouth Daily. Need lab work, Disp: 90 tablet, Rfl: 0    Allergies:   Allergies   Allergen Reactions   • Sulfa Antibiotics Headache     Headaches       Objective     Physical Exam:  Vital Signs: There were no vitals filed for this visit.  There is no height or  weight on file to calculate BMI.    Physical Exam  General: Pleasant 77-year-old white female being evaluated via video telemedicine, lying in her bed, alert and oriented, acutely ill but nontoxic, alert and oriented, appearing to be hydrated.  Head neck: Minimal left facial weakness, significant improved from her last office evaluation regarding her Bell's palsy, otherwise no obvious abnormalities noted  Pulmonary: No obvious cough wheeze or dyspnea  Cardiac: No indication of chest pains palpitations or dizziness  Neurological exam with some mild left facial weakness improving from her left-sided Bell's palsy, otherwise nonfocal  Physical exam otherwise not performed given limitations of video telemedicine.    Assessment / Plan      Assessment/Plan:   Diagnoses and all orders for this visit:    1. Clinical diagnosis of severe acute respiratory syndrome coronavirus 2 (SARS-CoV-2) disease (Primary)  -     Nirmatrelvir&Ritonavir 300/100 (PAXLOVID) 20 x 150 MG & 10 x 100MG tablet therapy pack tablet; Take 3 tablets by mouth 2 (Two) Times a Day for 5 days. Discontinue rosuvastatin while taking this medication  Dispense: 30 tablet; Refill: 0    Patient diagnosed today with COVID-19 infection at an urgent treatment center, candidate for Paxlovid therapy given high risk based upon age greater than 65 as well as comorbid conditions including diabetes.  She has normal renal function per testing from 4/2022 thus can take full dose which will be prescribed to [facility].  Push plenty fluids, treat symptoms with Motrin or Tylenol along with OTC cough and cold meds.  Strongly advised patient that she needs to obtain the COVID-19 vaccine series once she is clinically improved.  She has been advised to notify me or go to the ER if she were to develop any significant worsening specifically discussing signs of pneumonia such as significant dyspnea, etc.  She is very clinically stable at this time of assessment.    Follow Up:   Return if  symptoms worsen or fail to improve.    Any medications prescribed have been sent electronically to   [facility]/pharmacy #[id number] - [place], [place] - [address]EXT TO [facility] - [phone]  - [phone]   [address] [facility] [address]  Phone: [phone] Fax: [phone]    [place] [address]  Phone: [phone] Fax: [phone]    [facility] - [place], [place] - [address] [phone]  - [phone]   [address] [facility] [address]  Phone: [phone] Fax: [phone]      20 minutes were spent reviewing the patient's questionnaire, formulating a treatment plan, and relaying information to the patient via [employer].    Manjinder Randhawa. MD  [facility]  08/25/22  17:19 EDT

## 2022-08-25 NOTE — TELEPHONE ENCOUNTER
I have spoke with Dr. Dunbar and he says to schedule her for a telehealth visit at the end of the day. TF  I have tried to call regarding this and her phone says she isnt excepting calls. TF    I have gotten a hold of her and she is ok with this. She has an Iphone and will face time with him. TF

## 2022-08-25 NOTE — TELEPHONE ENCOUNTER
Caller: Earline Santacruz    Relationship: Self    Best call back number: [phone]    What medication are you requesting: NEW MEDICATIONS     What are your current symptoms: COVID    How long have you been experiencing symptoms: 2 DAYS    Have you had these symptoms before:    [x] Yes  [] No    Have you been treated for these symptoms before:   [] Yes  [x] No    If a prescription is needed, what is your preferred pharmacy and phone number: [place], [place] - [facility] [phone] [facility] [phone]      Additional notes: REQUESTING ANY MEDICATION TO HELP WITH HEADACHE AND EAR ACHE

## 2022-09-30 DIAGNOSIS — E11.40 TYPE 2 DIABETES MELLITUS WITH DIABETIC NEUROPATHY, WITHOUT LONG-TERM CURRENT USE OF INSULIN: ICD-10-CM

## 2022-09-30 DIAGNOSIS — F41.1 GENERALIZED ANXIETY DISORDER: Primary | ICD-10-CM

## 2022-09-30 RX ORDER — GABAPENTIN 300 MG/1
300 CAPSULE ORAL 3 TIMES DAILY
Qty: 90 CAPSULE | Refills: 2 | Status: SHIPPED | OUTPATIENT
Start: 2022-09-30 | End: 2023-03-21

## 2022-09-30 RX ORDER — ALPRAZOLAM 0.5 MG/1
0.5 TABLET ORAL NIGHTLY PRN
Qty: 30 TABLET | Refills: 0 | Status: SHIPPED | OUTPATIENT
Start: 2022-09-30 | End: 2022-10-31 | Stop reason: SDUPTHER

## 2022-09-30 NOTE — TELEPHONE ENCOUNTER
Caller: Earline Santacruz    Relationship: Self    Best call back number: [phone]    Requested Prescriptions:   Requested Prescriptions     Pending Prescriptions Disp Refills   • gabapentin (NEURONTIN) 300 MG capsule       Sig: Take 1 capsule by mouth 3 (Three) Times a Day.   • ALPRAZolam (XANAX) 0.5 MG tablet       Sig: Take 1 tablet by mouth At Night As Needed for Anxiety.        Pharmacy where request should be sent: [address] [phone] [facility] [phone] FX     Additional details provided by patient: PATIENT HAS CALLED REQUESTING REFILLS ON ABOVE MEDICATIONS    Does the patient have less than a 3 day supply:  [x] Yes  [] No    Hansel Talamantes   09/30/22 13:32 EDT

## 2022-09-30 NOTE — TELEPHONE ENCOUNTER
Please advise patient that she needs a follow-up visit, ideally with nurse practitioner Nimo Hinds, within the next month given her use of alprazolam and her gabapentin.

## 2022-10-07 LAB — HBA1C MFR BLD: 6.9 %

## 2022-10-31 ENCOUNTER — OFFICE VISIT (OUTPATIENT)
Dept: FAMILY MEDICINE CLINIC | Facility: CLINIC | Age: 77
End: 2022-10-31

## 2022-10-31 VITALS
BODY MASS INDEX: 24.18 KG/M2 | SYSTOLIC BLOOD PRESSURE: 118 MMHG | OXYGEN SATURATION: 94 % | WEIGHT: 131.4 LBS | HEART RATE: 97 BPM | RESPIRATION RATE: 16 BRPM | DIASTOLIC BLOOD PRESSURE: 72 MMHG | TEMPERATURE: 97.3 F | HEIGHT: 62 IN

## 2022-10-31 DIAGNOSIS — F41.1 GENERALIZED ANXIETY DISORDER: ICD-10-CM

## 2022-10-31 DIAGNOSIS — R35.0 URINARY FREQUENCY: Primary | ICD-10-CM

## 2022-10-31 PROCEDURE — 99214 OFFICE O/P EST MOD 30 MIN: CPT | Performed by: NURSE PRACTITIONER

## 2022-10-31 RX ORDER — ALPRAZOLAM 0.5 MG/1
0.5 TABLET ORAL NIGHTLY PRN
Qty: 30 TABLET | Refills: 0 | Status: SHIPPED | OUTPATIENT
Start: 2022-10-31 | End: 2022-12-05 | Stop reason: SDUPTHER

## 2022-10-31 RX ORDER — METHENAMINE HIPPURATE 1000 MG/1
1 TABLET ORAL 2 TIMES DAILY
COMMUNITY
Start: 2022-08-25

## 2022-11-01 ENCOUNTER — TELEPHONE (OUTPATIENT)
Dept: FAMILY MEDICINE CLINIC | Facility: CLINIC | Age: 77
End: 2022-11-01

## 2022-11-01 PROBLEM — F41.1 GENERALIZED ANXIETY DISORDER: Status: ACTIVE | Noted: 2022-11-01

## 2022-11-01 PROBLEM — R35.0 URINARY FREQUENCY: Status: ACTIVE | Noted: 2022-11-01

## 2022-11-01 NOTE — ASSESSMENT & PLAN NOTE
History of recurrent UTI. Evaluated by urology but hasn't been in over two years. Urinary complaints started as pyruia, frequency, she noted some blood initially and one episode of chills. For two weeks she has been taking methenamine and nitrofuratoin that she had in her cabinet from two year ago. All symptoms have resolved except for some urinary frequency.     -UA would not be useful today as she has been using AZO and already self treated with urinary antiseptic medications  -Will send urine culture and treat accordingly if indicated.   -We discussed if this becomes a recurrent problem again she will need to revisit urology.

## 2022-11-01 NOTE — TELEPHONE ENCOUNTER
Called and spoke with the pharmacy and they advised me that her insurance was not working . I called and advised the patient and she is going to call [facility]

## 2022-11-01 NOTE — TELEPHONE ENCOUNTER
PT CALLED STATING THAT THE PHARMACY IS HAVING AN ISSUE WITH HER PRESCRIPTION THAT WAS SENT YESTERDAY.

## 2022-11-01 NOTE — ASSESSMENT & PLAN NOTE
Situational associated with divorce in the past.  She and her ex  are living together although not sleeping together.  She states that they have their issues but in general they continue to be amicable and in fact do most things together.  Plan:       2.  Alprazolam 0.25 mg, utilize one at at bedtime, 1 if necessary during the day.  Refill, #60, 2 refills.

## 2022-11-02 ENCOUNTER — TELEPHONE (OUTPATIENT)
Dept: FAMILY MEDICINE CLINIC | Facility: CLINIC | Age: 77
End: 2022-11-02

## 2022-11-02 LAB
BACTERIA UR CULT: NORMAL
BACTERIA UR CULT: NORMAL

## 2022-11-02 NOTE — TELEPHONE ENCOUNTER
Called and spoke with patient and advised her that the urine culture was normal and she verbalized understanding.      Nimo patient states that she is taking 2 alprazolam a day not just one. Could you check Trev

## 2022-11-02 NOTE — TELEPHONE ENCOUNTER
----- Message from NESHA Llanos sent at 11/2/2022 11:44 AM EDT -----  Please let Earline know her urine culture didn't grow any bacteria. Keep taking cranberry pills and drinking plenty of water

## 2022-11-17 ENCOUNTER — OFFICE VISIT (OUTPATIENT)
Dept: FAMILY MEDICINE CLINIC | Facility: CLINIC | Age: 77
End: 2022-11-17

## 2022-11-17 VITALS
OXYGEN SATURATION: 98 % | SYSTOLIC BLOOD PRESSURE: 122 MMHG | WEIGHT: 129.2 LBS | HEIGHT: 61 IN | BODY MASS INDEX: 24.39 KG/M2 | HEART RATE: 68 BPM | DIASTOLIC BLOOD PRESSURE: 84 MMHG

## 2022-11-17 DIAGNOSIS — K21.9 GASTROESOPHAGEAL REFLUX DISEASE WITHOUT ESOPHAGITIS: ICD-10-CM

## 2022-11-17 DIAGNOSIS — R11.0 NAUSEA: ICD-10-CM

## 2022-11-17 DIAGNOSIS — N30.01 ACUTE CYSTITIS WITH HEMATURIA: Primary | ICD-10-CM

## 2022-11-17 LAB
BILIRUB BLD-MCNC: NEGATIVE MG/DL
CLARITY, POC: ABNORMAL
COLOR UR: YELLOW
GLUCOSE UR STRIP-MCNC: NEGATIVE MG/DL
KETONES UR QL: ABNORMAL
LEUKOCYTE EST, POC: ABNORMAL
NITRITE UR-MCNC: NEGATIVE MG/ML
PH UR: 5.5 [PH] (ref 5–8)
PROT UR STRIP-MCNC: ABNORMAL MG/DL
RBC # UR STRIP: NEGATIVE /UL
SP GR UR: 1.01 (ref 1–1.03)
UROBILINOGEN UR QL: ABNORMAL

## 2022-11-17 PROCEDURE — 99214 OFFICE O/P EST MOD 30 MIN: CPT | Performed by: INTERNAL MEDICINE

## 2022-11-17 PROCEDURE — 81002 URINALYSIS NONAUTO W/O SCOPE: CPT | Performed by: INTERNAL MEDICINE

## 2022-11-17 RX ORDER — PROMETHAZINE HYDROCHLORIDE 12.5 MG/1
12.5 TABLET ORAL EVERY 6 HOURS PRN
Qty: 12 TABLET | Refills: 0 | Status: SHIPPED | OUTPATIENT
Start: 2022-11-17 | End: 2023-03-23

## 2022-11-17 RX ORDER — NITROFURANTOIN 25; 75 MG/1; MG/1
CAPSULE ORAL
COMMUNITY
Start: 2022-11-16 | End: 2022-11-23

## 2022-11-17 RX ORDER — OMEPRAZOLE 40 MG/1
40 CAPSULE, DELAYED RELEASE ORAL DAILY
Qty: 30 CAPSULE | Refills: 1 | Status: SHIPPED | OUTPATIENT
Start: 2022-11-17 | End: 2023-01-03

## 2022-11-17 NOTE — PROGRESS NOTES
"    Follow Up Office Visit      Date: 2022   Patient Name: Earline Santacruz  : 1945   MRN: [id number]     Chief Complaint:    Chief Complaint   Patient presents with   • Blood in Urine     Painful urination, blood in urine, started yesterday        History of Present Illness: Earline Santacruz is a 77 y.o. female who is here today for acute onset yesterday of a suspicion of a UTI with urinary frequency urgency dysuria and gross hematuria.  Also noting some pelvic pressure and some low back discomfort.  She did start taking a previous prescription both of methenamine as well as Macrodantin yesterday, and has had improvement in her symptoms but still states that she does not \"feel well\".  She has had a little bit of nausea.  She also relates that her upper back seems to hurt at times when she eats and feels like she may have heartburn causing this for which she has been taking Rolaids and Lisa-Hosford recently.  No bowel problems.  No fevers but does have a chill when she has been urinating.  History of recurrent UTIs, previously having seen Dr. Jabier Vasques of urology, who had initially prescribed methenamine prophylaxis which she states was \"nasty\", then apparently more recently had prescribed her Macrobid 100 mg to take once daily prophylaxis.  She had not been taking either medication regularly up until yesterday.  She also does take cranberry tablets over-the-counter and occasionally takes Premarin vaginal cream, typically weekly application..  Patient has an appointment to see Dr. Vasques of urology next week.    Subjective      Review of Systems:   Review of Systems    I have reviewed the patients family history, social history, past medical history, past surgical history and have updated it as appropriate.     Medications:     Current Outpatient Medications:   •  ALPRAZolam (XANAX) 0.5 MG tablet, Take 1 tablet by mouth At Night As Needed for Anxiety., Disp: 30 tablet, Rfl: 0  •  aspirin 81 " "MG EC tablet, Take 81 mg by mouth daily ., Disp: , Rfl:   •  coenzyme Q10 100 MG capsule, Take 100 mg by mouth daily ., Disp: , Rfl:   •  levothyroxine (SYNTHROID, LEVOTHROID) 50 MCG tablet, Take 50 mcg by mouth daily ., Disp: , Rfl:   •  losartan (COZAAR) 25 MG tablet, Take 12.5 mg by mouth Daily., Disp: , Rfl:   •  metFORMIN (GLUCOPHAGE) 500 MG tablet, Take 2,000 mg by mouth daily ., Disp: , Rfl:   •  methenamine (HIPREX) 1 g tablet, Take 1 tablet by mouth 2 (Two) Times a Day., Disp: , Rfl:   •  nitrofurantoin, macrocrystal-monohydrate, (MACROBID) 100 MG capsule, , Disp: , Rfl:   •  Premarin 0.625 MG/GM vaginal cream, Insert 0.625 mg into the vagina 2 (Two) Times a Day As Needed., Disp: , Rfl:   •  rosuvastatin (CRESTOR) 20 MG tablet, Take 1 tablet by mouth Daily. Need lab work, Disp: 90 tablet, Rfl: 0  •  gabapentin (NEURONTIN) 300 MG capsule, Take 1 capsule by mouth 3 (Three) Times a Day for 30 days., Disp: 90 capsule, Rfl: 2  •  omeprazole (priLOSEC) 40 MG capsule, Take 1 capsule by mouth Daily., Disp: 30 capsule, Rfl: 1  •  promethazine (PHENERGAN) 12.5 MG tablet, Take 1 tablet by mouth Every 6 (Six) Hours As Needed for Nausea or Vomiting., Disp: 12 tablet, Rfl: 0    Allergies:   Allergies   Allergen Reactions   • Sulfa Antibiotics Headache     Headaches       Objective     Physical Exam: Please see above  Vital Signs:   Vitals:    11/17/22 0851   BP: 122/84   Pulse: 68   SpO2: 98%  Comment: resting room air   Weight: 58.6 kg (129 lb 3.2 oz)   Height: 156.2 cm (61.5\")     Body mass index is 24.02 kg/m².  BMI is within normal parameters. No other follow-up for BMI required.       Physical Exam  General: Pleasant elderly white female, stating she does not feel well but does not appear acutely ill.  Alert and oriented.  Lungs clear  Cardiac regular rate rhythm with no murmurs gallops rubs  Abdomen with some diffuse tenderness more so in the epigastrium, but also involving the suprapubic area, no rebound or guard, " no organomegaly or masses, normal bowel sounds  Back with no CVA tenderness to percussion, indicates having some low back discomfort when she reclines and intrinsically, but no actual tenderness to palpation  Procedures    Results:   Labs:   Hemoglobin A1C   Date Value Ref Range Status   04/04/2022 6.9  Final     TSH   Date Value Ref Range Status   11/18/2019 1.200 0.270 - 4.200 uIU/mL Final        POCT Results (if applicable):   Results for orders placed or performed in visit on 11/17/22   POC Urinalysis Dipstick    Specimen: Urine   Result Value Ref Range    Color Yellow Yellow, Straw, Dark Yellow, Kathy    Clarity, UA Cloudy (A) Clear    Glucose, UA Negative Negative mg/dL    Bilirubin Negative Negative    Ketones, UA 15 mg/dL (A) Negative    Specific Gravity  1.015 1.005 - 1.030    Blood, UA Negative Negative    pH, Urine 5.5 5.0 - 8.0    Protein, POC Trace (A) Negative mg/dL    Urobilinogen, UA 0.2 E.U./dL Normal, 0.2 E.U./dL    Leukocytes 75 Ashley/ul (A) Negative    Nitrite, UA Negative Negative       Imaging:   No valid procedures specified.       Assessment / Plan      Assessment/Plan:   Diagnoses and all orders for this visit:    1. Acute cystitis with hematuria (Primary)  -     POC Urinalysis Dipstick  Patient describes classic symptoms of an acute cystitis yesterday including gross hematuria.  She took Macrobid 100 mg tablet prescribed by Dr. Vasques of urology, and actually feels substantially better today.  Her urinalysis does not indicate blood at this time but she does still have some leukocytes.  I am not culturing her urine given clinical improvement and the fact she has been on the Macrobid, but she will continue Macrobid 100 mg twice daily to complete a 5-day course of therapy, after which I advised that she reinitiate her methenamine 1 g twice daily prophylaxis until she follows up with Dr. Vasques next week.  She may also continue her cranberry tablets, and also advised that she take her Premarin  0.625 mg/g vaginal cream applied 2-3 times weekly to help further UTI prophylaxis.  2. Gastroesophageal reflux disease without esophagitis  -     omeprazole (priLOSEC) 40 MG capsule; Take 1 capsule by mouth Daily.  Dispense: 30 capsule; Refill: 1  Patient having daily symptoms over the last couple weeks of what appears to be dyspepsia with some nausea.  Initiate omeprazole 40 mg daily for minimum of 2 months then trial off therapy, using OTC antacids as needed in interim.  Advise if not improving  3. Nausea  -     promethazine (PHENERGAN) 12.5 MG tablet; Take 1 tablet by mouth Every 6 (Six) Hours As Needed for Nausea or Vomiting.  Dispense: 12 tablet; Refill: 0  Secondary to both previous problems.  Patient indicates an intolerance to Zofran.  Prescribe low-dose promethazine as needed.      Vaccine Counseling:      Follow Up:   No follow-ups on file.      At [facility], we believe that sharing information builds trust and better relationships. You are receiving this note because you recently visited [facility]. It is possible you will see health information before a provider has talked with you about it. This kind of information can be easy to misunderstand. To help you fully understand what it means for your health, we urge you to discuss this note with your provider.    Manjinder Randhawa MD  [facility] [place] FRANSISCA Ragland

## 2022-11-23 ENCOUNTER — OFFICE VISIT (OUTPATIENT)
Dept: FAMILY MEDICINE CLINIC | Facility: CLINIC | Age: 77
End: 2022-11-23

## 2022-11-23 VITALS
HEART RATE: 68 BPM | HEIGHT: 62 IN | OXYGEN SATURATION: 98 % | DIASTOLIC BLOOD PRESSURE: 65 MMHG | TEMPERATURE: 98.4 F | SYSTOLIC BLOOD PRESSURE: 132 MMHG | BODY MASS INDEX: 24.03 KG/M2 | WEIGHT: 130.6 LBS

## 2022-11-23 DIAGNOSIS — E78.5 DYSLIPIDEMIA: ICD-10-CM

## 2022-11-23 DIAGNOSIS — G89.29 CHRONIC NONINTRACTABLE HEADACHE, UNSPECIFIED HEADACHE TYPE: ICD-10-CM

## 2022-11-23 DIAGNOSIS — I25.10 CORONARY ARTERY DISEASE INVOLVING NATIVE CORONARY ARTERY OF NATIVE HEART WITHOUT ANGINA PECTORIS: ICD-10-CM

## 2022-11-23 DIAGNOSIS — N30.20 CHRONIC CYSTITIS: ICD-10-CM

## 2022-11-23 DIAGNOSIS — Z00.00 ANNUAL PHYSICAL EXAM: ICD-10-CM

## 2022-11-23 DIAGNOSIS — R51.9 CHRONIC NONINTRACTABLE HEADACHE, UNSPECIFIED HEADACHE TYPE: ICD-10-CM

## 2022-11-23 DIAGNOSIS — E11.42 TYPE 2 DIABETES MELLITUS WITH DIABETIC POLYNEUROPATHY, WITHOUT LONG-TERM CURRENT USE OF INSULIN: Primary | ICD-10-CM

## 2022-11-23 DIAGNOSIS — I10 HYPERTENSION, UNSPECIFIED TYPE: ICD-10-CM

## 2022-11-23 DIAGNOSIS — F41.1 GENERALIZED ANXIETY DISORDER: ICD-10-CM

## 2022-11-23 DIAGNOSIS — E03.9 HYPOTHYROIDISM, UNSPECIFIED TYPE: ICD-10-CM

## 2022-11-23 LAB
EXPIRATION DATE: NORMAL
GLUCOSE BLDC GLUCOMTR-MCNC: 132 MG/DL (ref 70–130)
HBA1C MFR BLD: 6.3 %
Lab: NORMAL

## 2022-11-23 PROCEDURE — 99214 OFFICE O/P EST MOD 30 MIN: CPT | Performed by: NURSE PRACTITIONER

## 2022-11-23 PROCEDURE — 82962 GLUCOSE BLOOD TEST: CPT | Performed by: NURSE PRACTITIONER

## 2022-11-23 PROCEDURE — 3044F HG A1C LEVEL LT 7.0%: CPT | Performed by: NURSE PRACTITIONER

## 2022-11-23 PROCEDURE — 83036 HEMOGLOBIN GLYCOSYLATED A1C: CPT | Performed by: NURSE PRACTITIONER

## 2022-11-23 NOTE — ASSESSMENT & PLAN NOTE
Glucose monitoring daily at home, averaging 130 range  She has been checking home glucose and titrating her metformin based on those readings, she was not instructed to do this but took it upon herself. Her A1C is 6.3% today.  We discussed the importance of her taking medications as prescribed, if she has issues with the way her glucose is running on home monitoring or issues with medications she is asked to call the office as opposed to adjusting meds herself.  Recent eye exam without signs of diabetic retinopathy

## 2022-11-23 NOTE — PROGRESS NOTES
"    Annual Physical     Name: Earline Santacruz    : 1945     MRN: [id number]     Chief Complaint  Annual Exam    Subjective     History of Present Illness:  Earline Santacruz is a 77 y.o. female who presents today for annual physical exam. She has chronic issues of HTN, DM 2, generalized anxiety disorder, CAD and chronic cystitis. She has been referred to Dr. Jabier Vasques but only followed through with her initial visit and hasn't returned despite ongoing urinary issues. I emphasized keeping up with urology as they will be the ones who can identify the root cause of her issues. She is just finishing a round of nitrofuratoin she was given in our office one week ago then will resume her daily methanamine.   BP is well controlled, she reports satisfactory control at home as well. She has been checking home glucose and titrating her metformin based on those readings, she was not instructed to do this but took it upon herself. Her A1C is 6.3% today.   She had a colonoscopy in  that was without abnormal findings, ten year follow-up suggested. Declines mammogram.   She has complaints of daily headache, above her right eye, she refers to this being \"where her shingles were\". She has been referred to Dr. Morales in the past but wishes to get a second opinion    The patient is being seen for a health maintenance evaluation.    Social History  Tobacco Use: Low Risk    • Smoking Tobacco Use: Never   • Smokeless Tobacco Use: Never   • Passive Exposure: Not on file     Social History     Socioeconomic History   • Marital status:    Tobacco Use   • Smoking status: Never   • Smokeless tobacco: Never   Vaping Use   • Vaping Use: Never used   Substance and Sexual Activity   • Alcohol use: No   • Drug use: No   • Sexual activity: Defer       General History  Earline  does have regular dental visits.  She does not complain of vision problems. Last eye exam was Two months ago.  Immunizations are up to date. " "  Lifestyle  Earline  consumes in general, a \"healthy\" diet  .  She exercises rarely.    Reproductive Health  Earline  is postmenopausal.      Screening  Last pap was before hysterectomy years ago  Last Completed Pap Smear     This patient has no relevant Health Maintenance data.      . History of abnormal pap smear or family history of gyn cancer: no  Last mammogram was years ago  Last Completed Mammogram     This patient has no relevant Health Maintenance data.      . Personal or family history of abnormal mammograms or breast cancer: no  Last colonoscopy was 2020. Normal Suggested 10 year follow up  Last Completed Colonoscopy          COLORECTAL CANCER SCREENING (COLONOSCOPY - Every 10 Years) Next due on 9/29/2030 09/29/2020  COLONOSCOPY (Done - scanned from legacy)    09/29/2020  SCANNED - COLONOSCOPY            . Family history of colon cancer: no    Health Maintenance Summary          Overdue - MAMMOGRAM (Every 2 Years) Overdue - never done    No completion, postpone, or frequency change history exists for this topic.          Overdue - URINE MICROALBUMIN (Yearly) Overdue - never done    No completion, postpone, or frequency change history exists for this topic.          Overdue - DXA SCAN (Every 2 Years) Overdue - never done    No completion, postpone, or frequency change history exists for this topic.          Overdue - TDAP/TD VACCINES (1 - Tdap) Overdue - never done    No completion, postpone, or frequency change history exists for this topic.          Overdue - Pneumococcal Vaccine 65+ (2 - PPSV23 if available, else PCV20) Overdue since 10/17/2016    10/17/2015  Imm Admin: Pneumococcal Conjugate 13-Valent (PCV13)          Overdue - HEPATITIS C SCREENING (Once) Overdue - never done    No completion, postpone, or frequency change history exists for this topic.          Overdue - ANNUAL WELLNESS VISIT (Yearly) Overdue - never done    No completion, postpone, or frequency change history exists for this " topic.          Overdue - DIABETIC EYE EXAM (Yearly) Overdue - never done    No completion, postpone, or frequency change history exists for this topic.          Overdue - COVID-19 Vaccine (2 - Booster for Laura series) Overdue since 10/21/2021    08/26/2021  Imm Admin: COVID-19 (LAURA)          Overdue - INFLUENZA VACCINE (Yearly - August to March) Overdue since 8/1/2022 09/14/2020  Imm Admin: Fluzone High Dose =>65 Years ([facility] ONLY)          Ordered - LIPID PANEL (Yearly) Ordered on 11/23/2022 04/04/2022  Done - scanned from [facility]    09/13/2017  Lipid Panel    06/29/2017  Lipid panel          HEMOGLOBIN A1C (Every 6 Months) Next due on 5/23/2023 11/23/2022  Hemoglobin A1C component of POC Glycosylated Hemoglobin (Hb A1C)    04/04/2022  Done - scanned from [facility]    04/04/2022  Hemoglobin A1C component of Hemoglobin A1c    11/18/2019  Hemoglobin A1C component of Hemoglobin A1c          COLORECTAL CANCER SCREENING (COLONOSCOPY - Every 10 Years) Next due on 9/29/2030 09/29/2020  COLONOSCOPY (Done - scanned from [facility])    09/29/2020  SCANNED - COLONOSCOPY          ZOSTER VACCINE (Series Information) Completed    05/13/2021  Imm Admin: Shingrix    03/03/2021  Imm Admin: Shingrix              Immunization History   Administered Date(s) Administered   • COVID-19 (LAURA) 08/26/2021   • Fluzone High Dose =>65 Years ([employer] ONLY) 09/14/2020   • Pneumococcal Conjugate 13-Valent (PCV13) 10/17/2015   • Shingrix 03/03/2021, 05/13/2021       Review of Systems   Constitutional: Negative for activity change, appetite change, chills, fatigue and fever.   HENT: Negative for ear pain, hearing loss and trouble swallowing.    Eyes: Negative for photophobia and visual disturbance.   Respiratory: Negative for cough, choking, chest tightness, shortness of breath and wheezing.    Cardiovascular: Negative for chest pain, palpitations and leg swelling.   Gastrointestinal: Negative for abdominal distention, abdominal  "pain, constipation, diarrhea, nausea and vomiting.   Endocrine: Negative for cold intolerance, heat intolerance, polydipsia and polyuria.   Genitourinary: Positive for dysuria and urgency. Negative for decreased urine volume, flank pain and hematuria.   Musculoskeletal: Negative for arthralgias, joint swelling and myalgias.   Skin: Negative for rash.   Neurological: Positive for headaches. Negative for dizziness, weakness and light-headedness.   Psychiatric/Behavioral: Negative for dysphoric mood. The patient is not nervous/anxious.        Objective     Vital Signs  /65 (BP Location: Left arm, Patient Position: Sitting, Cuff Size: Adult)   Pulse 68   Temp 98.4 °F (36.9 °C) (Temporal)   Ht 156.2 cm (61.5\")   Wt 59.2 kg (130 lb 9.6 oz)   SpO2 98%   BMI 24.28 kg/m²   Estimated body mass index is 24.28 kg/m² as calculated from the following:    Height as of this encounter: 156.2 cm (61.5\").    Weight as of this encounter: 59.2 kg (130 lb 9.6 oz).    BMI is within normal parameters. No other follow-up for BMI required.      Physical Exam  Constitutional:       Appearance: Normal appearance.   HENT:      Head: Normocephalic and atraumatic.      Right Ear: Tympanic membrane, ear canal and external ear normal.      Left Ear: Tympanic membrane, ear canal and external ear normal.      Nose: Nose normal.      Mouth/Throat:      Mouth: Mucous membranes are moist.      Pharynx: Oropharynx is clear.   Eyes:      Conjunctiva/sclera: Conjunctivae normal.      Pupils: Pupils are equal, round, and reactive to light.   Cardiovascular:      Rate and Rhythm: Normal rate and regular rhythm.      Heart sounds: Normal heart sounds.   Pulmonary:      Effort: Pulmonary effort is normal.      Breath sounds: Normal breath sounds.   Abdominal:      General: Bowel sounds are normal. There is no distension.      Palpations: Abdomen is soft. There is no mass.      Tenderness: There is no abdominal tenderness.      Hernia: No hernia is " present.   Musculoskeletal:         General: Normal range of motion.      Cervical back: Normal range of motion and neck supple.   Skin:     General: Skin is warm and dry.      Capillary Refill: Capillary refill takes less than 2 seconds.   Neurological:      Mental Status: She is alert and oriented to person, place, and time.   Psychiatric:         Mood and Affect: Mood normal.         Behavior: Behavior normal.         Thought Content: Thought content normal.         Judgment: Judgment normal.          Assessment and Plan     Diagnoses and all orders for this visit:    1. Type 2 diabetes mellitus with diabetic polyneuropathy, without long-term current use of insulin (HCC) (Primary)  Assessment & Plan:  Glucose monitoring daily at home, averaging 130 range  She has been checking home glucose and titrating her metformin based on those readings, she was not instructed to do this but took it upon herself. Her A1C is 6.3% today.  We discussed the importance of her taking medications as prescribed, if she has issues with the way her glucose is running on home monitoring or issues with medications she is asked to call the office as opposed to adjusting meds herself.  Recent eye exam without signs of diabetic retinopathy      Orders:  -     POC Glycosylated Hemoglobin (Hb A1C)  -     POCT Glucose    2. Generalized anxiety disorder  Assessment & Plan:  Psychological condition is improving with treatment.  Continue current treatment regimen.  Psychological condition  will be reassessed in 3 months.  Continue alprazolam 0.5mg       3. Dyslipidemia  Assessment & Plan:  Currently taking rosuvastatin 20mg daily  Will recheck lipid panel today    Orders:  -     Lipid Panel; Future    4. Coronary artery disease involving native coronary artery of native heart without angina pectoris    5. Hypertension, unspecified type  Assessment & Plan:  Hypertension is well controlled on current medications. Home readings of average 116/60  "range    1. Losartan 25mg daily  2. Low sodium diet      6. Chronic cystitis  Assessment & Plan:  She has been referred to Dr. Jabier Vasques but only followed through with her initial visit and hasn't returned despite ongoing urinary issues. I emphasized keeping up with urology as they will be the ones who can identify the root cause of her issues. She is just finishing a round of nitrofuratoin she was given in our office one week ago then will resume her daily methanamine.         7. Chronic nonintractable headache, unspecified headache type  Assessment & Plan:  She has complaints of daily headache, above her right eye, she refers to this being \"where her shingles were\". She reports the headache would be improved with ibuprofen in the past but now nothing works. She has been referred to Dr. Morales in the past but wishes to get a second opinion.  -Refer to [facility] neurology    Orders:  -     Ambulatory Referral to Neurology    8. Annual physical exam  -     CBC & Differential; Future  -     Comprehensive Metabolic Panel; Future    9. Hypothyroidism, unspecified type  -     TSH; Future  -     T4, Free; Future      Follow Up  Return in about 3 months (around 2/23/2023) for Next scheduled follow up.    Nimo Hinds, APRN  "

## 2022-11-23 NOTE — ASSESSMENT & PLAN NOTE
She has been referred to Dr. Jabier Vasques but only followed through with her initial visit and hasn't returned despite ongoing urinary issues. I emphasized keeping up with urology as they will be the ones who can identify the root cause of her issues. She is just finishing a round of nitrofuratoin she was given in our office one week ago then will resume her daily methanamine.

## 2022-11-23 NOTE — ASSESSMENT & PLAN NOTE
"She has complaints of daily headache, above her right eye, she refers to this being \"where her shingles were\". She reports the headache would be improved with ibuprofen in the past but now nothing works. She has been referred to Dr. Morales in the past but wishes to get a second opinion.  -Refer to [facility]  "

## 2022-11-23 NOTE — ASSESSMENT & PLAN NOTE
Psychological condition is improving with treatment.  Continue current treatment regimen.  Psychological condition  will be reassessed in 3 months.  Continue alprazolam 0.5mg

## 2022-11-23 NOTE — ASSESSMENT & PLAN NOTE
Hypertension is well controlled on current medications. Home readings of average 116/60 range    1. Losartan 25mg daily  2. Low sodium diet

## 2022-12-05 ENCOUNTER — CLINICAL SUPPORT (OUTPATIENT)
Dept: FAMILY MEDICINE CLINIC | Facility: CLINIC | Age: 77
End: 2022-12-05

## 2022-12-05 DIAGNOSIS — F41.1 GENERALIZED ANXIETY DISORDER: ICD-10-CM

## 2022-12-05 DIAGNOSIS — Z00.00 ANNUAL PHYSICAL EXAM: ICD-10-CM

## 2022-12-05 DIAGNOSIS — E78.5 DYSLIPIDEMIA: ICD-10-CM

## 2022-12-05 DIAGNOSIS — E03.9 HYPOTHYROIDISM, UNSPECIFIED TYPE: ICD-10-CM

## 2022-12-05 PROCEDURE — 36415 COLL VENOUS BLD VENIPUNCTURE: CPT | Performed by: NURSE PRACTITIONER

## 2022-12-05 NOTE — PROGRESS NOTES
Venipuncture Blood Specimen Collection  Venipuncture performed in left arm by Reyna Mojica MA with good hemostasis. Patient tolerated the procedure well without complications.   12/05/22   Reyna Mojica MA

## 2022-12-06 DIAGNOSIS — F41.1 GENERALIZED ANXIETY DISORDER: ICD-10-CM

## 2022-12-06 LAB
ALBUMIN SERPL-MCNC: 4.5 G/DL (ref 3.7–4.7)
ALBUMIN/GLOB SERPL: 2.3 {RATIO} (ref 1.2–2.2)
ALP SERPL-CCNC: 55 IU/L (ref 44–121)
ALT SERPL-CCNC: 11 IU/L (ref 0–32)
AST SERPL-CCNC: 15 IU/L (ref 0–40)
BASOPHILS # BLD AUTO: 0.1 X10E3/UL (ref 0–0.2)
BASOPHILS NFR BLD AUTO: 1 %
BILIRUB SERPL-MCNC: 0.2 MG/DL (ref 0–1.2)
BUN SERPL-MCNC: 12 MG/DL (ref 8–27)
BUN/CREAT SERPL: 17 (ref 12–28)
CALCIUM SERPL-MCNC: 9.6 MG/DL (ref 8.7–10.3)
CHLORIDE SERPL-SCNC: 105 MMOL/L (ref 96–106)
CHOLEST SERPL-MCNC: 149 MG/DL (ref 100–199)
CO2 SERPL-SCNC: 25 MMOL/L (ref 20–29)
CREAT SERPL-MCNC: 0.71 MG/DL (ref 0.57–1)
EGFRCR SERPLBLD CKD-EPI 2021: 88 ML/MIN/1.73
EOSINOPHIL # BLD AUTO: 0.2 X10E3/UL (ref 0–0.4)
EOSINOPHIL NFR BLD AUTO: 2 %
ERYTHROCYTE [DISTWIDTH] IN BLOOD BY AUTOMATED COUNT: 11.8 % (ref 11.7–15.4)
GLOBULIN SER CALC-MCNC: 2 G/DL (ref 1.5–4.5)
GLUCOSE SERPL-MCNC: 128 MG/DL (ref 70–99)
HCT VFR BLD AUTO: 40.2 % (ref 34–46.6)
HDLC SERPL-MCNC: 51 MG/DL
HGB BLD-MCNC: 13.7 G/DL (ref 11.1–15.9)
IMM GRANULOCYTES # BLD AUTO: 0 X10E3/UL (ref 0–0.1)
IMM GRANULOCYTES NFR BLD AUTO: 0 %
LDLC SERPL CALC-MCNC: 80 MG/DL (ref 0–99)
LYMPHOCYTES # BLD AUTO: 4.1 X10E3/UL (ref 0.7–3.1)
LYMPHOCYTES NFR BLD AUTO: 43 %
MCH RBC QN AUTO: 32.5 PG (ref 26.6–33)
MCHC RBC AUTO-ENTMCNC: 34.1 G/DL (ref 31.5–35.7)
MCV RBC AUTO: 95 FL (ref 79–97)
MONOCYTES # BLD AUTO: 0.7 X10E3/UL (ref 0.1–0.9)
MONOCYTES NFR BLD AUTO: 7 %
NEUTROPHILS # BLD AUTO: 4.5 X10E3/UL (ref 1.4–7)
NEUTROPHILS NFR BLD AUTO: 47 %
PLATELET # BLD AUTO: 362 X10E3/UL (ref 150–450)
POTASSIUM SERPL-SCNC: 4.1 MMOL/L (ref 3.5–5.2)
PROT SERPL-MCNC: 6.5 G/DL (ref 6–8.5)
RBC # BLD AUTO: 4.22 X10E6/UL (ref 3.77–5.28)
SODIUM SERPL-SCNC: 144 MMOL/L (ref 134–144)
T4 FREE SERPL-MCNC: 1.28 NG/DL (ref 0.82–1.77)
TRIGL SERPL-MCNC: 95 MG/DL (ref 0–149)
TSH SERPL DL<=0.005 MIU/L-ACNC: 1.26 UIU/ML (ref 0.45–4.5)
VLDLC SERPL CALC-MCNC: 18 MG/DL (ref 5–40)
WBC # BLD AUTO: 9.5 X10E3/UL (ref 3.4–10.8)

## 2022-12-06 RX ORDER — ALPRAZOLAM 0.5 MG/1
0.5 TABLET ORAL NIGHTLY PRN
Qty: 30 TABLET | Refills: 0 | Status: SHIPPED | OUTPATIENT
Start: 2022-12-06 | End: 2023-01-03

## 2022-12-06 RX ORDER — ALPRAZOLAM 0.5 MG/1
TABLET ORAL
Qty: 30 TABLET | Refills: 0 | OUTPATIENT
Start: 2022-12-06

## 2022-12-08 ENCOUNTER — TELEPHONE (OUTPATIENT)
Dept: FAMILY MEDICINE CLINIC | Facility: CLINIC | Age: 77
End: 2022-12-08

## 2022-12-08 NOTE — TELEPHONE ENCOUNTER
----- Message from NESHA Llanos sent at 12/8/2022  2:11 PM EST -----  Please let Earline known her labs were WNL

## 2023-01-03 ENCOUNTER — OFFICE VISIT (OUTPATIENT)
Dept: FAMILY MEDICINE CLINIC | Facility: CLINIC | Age: 78
End: 2023-01-03
Payer: MEDICARE

## 2023-01-03 VITALS
BODY MASS INDEX: 23.08 KG/M2 | WEIGHT: 125.4 LBS | TEMPERATURE: 97.9 F | OXYGEN SATURATION: 97 % | HEART RATE: 77 BPM | SYSTOLIC BLOOD PRESSURE: 106 MMHG | HEIGHT: 62 IN | DIASTOLIC BLOOD PRESSURE: 68 MMHG | RESPIRATION RATE: 18 BRPM

## 2023-01-03 DIAGNOSIS — F41.1 GENERALIZED ANXIETY DISORDER: ICD-10-CM

## 2023-01-03 DIAGNOSIS — F43.21 SITUATIONAL DEPRESSION: ICD-10-CM

## 2023-01-03 DIAGNOSIS — F41.1 GENERALIZED ANXIETY DISORDER: Primary | ICD-10-CM

## 2023-01-03 PROCEDURE — 99213 OFFICE O/P EST LOW 20 MIN: CPT | Performed by: NURSE PRACTITIONER

## 2023-01-03 RX ORDER — ESCITALOPRAM OXALATE 10 MG/1
10 TABLET ORAL DAILY
Qty: 30 TABLET | Refills: 1 | Status: SHIPPED | OUTPATIENT
Start: 2023-01-03 | End: 2023-02-22

## 2023-01-03 RX ORDER — ALPRAZOLAM 0.5 MG/1
TABLET ORAL
Qty: 30 TABLET | Refills: 0 | Status: SHIPPED | OUTPATIENT
Start: 2023-01-03 | End: 2023-01-30

## 2023-01-03 NOTE — PROGRESS NOTES
Office Note     Name: Earline Santacruz    : 1945     MRN: [id number]     Chief Complaint  Follow-up    Subjective     History of Present Illness:  Earlnie Santacruz is a 77 y.o. female who presents today for follow-up on issues with generalized anxiety. Earline has been followed for many years with generalized anxiety, up until now has been well controlled with PRN xanax 0.5mg nightly at bedtime. Today, she is having issues with increased anxiety, now with some depression, due to the sudden death of her ex-, whom she continued to live with, a week and a half ago. He has left behind significant debt for Earline along with some other troublesome issues. She reports she is wanting to cry \"all the time\". She isn't sleeping well, her appetite is decreased. She does not have homicidal or suicidal thoughts. Her BP is well controlled. She has no further complaints or concerns today.     Review of Systems   Constitutional: Positive for appetite change. Negative for fatigue.   Eyes: Negative for blurred vision and double vision.   Respiratory: Negative for cough, chest tightness, shortness of breath and wheezing.    Cardiovascular: Negative for chest pain, palpitations and leg swelling.   Gastrointestinal: Negative for abdominal pain, constipation, diarrhea, nausea and vomiting.   Musculoskeletal: Negative for arthralgias and myalgias.   Neurological: Negative for dizziness, weakness, light-headedness, memory problem and confusion.   Psychiatric/Behavioral: Positive for sleep disturbance, depressed mood and stress. Negative for self-injury and suicidal ideas. The patient is nervous/anxious.        Objective     Past Medical History:   Diagnosis Date   • Diabetes mellitus (HCC)    • Hyperlipidemia    • Hypertension    • Panic attacks      Past Surgical History:   Procedure Laterality Date   • APPENDECTOMY     • BREAST LUMPECTOMY      RIGHT    • CATARACT EXTRACTION, BILATERAL     • HAND SURGERY      LEFT     • TOTAL ABDOMINAL HYSTERECTOMY       Family History   Problem Relation Age of Onset   • No Known Problems Mother    • No Known Problems Father        Vital Signs  /68 (BP Location: Left arm, Patient Position: Sitting, Cuff Size: Adult)   Pulse 77   Temp 97.9 °F (36.6 °C) (Temporal)   Resp 18   Ht 156.2 cm (61.5\")   Wt 56.9 kg (125 lb 6.4 oz)   SpO2 97%   BMI 23.31 kg/m²   Estimated body mass index is 23.31 kg/m² as calculated from the following:    Height as of this encounter: 156.2 cm (61.5\").    Weight as of this encounter: 56.9 kg (125 lb 6.4 oz).    Physical Exam  Vitals reviewed.   Constitutional:       Appearance: Normal appearance.   HENT:      Head: Normocephalic and atraumatic.      Mouth/Throat:      Mouth: Mucous membranes are moist.      Pharynx: Oropharynx is clear.   Eyes:      Conjunctiva/sclera: Conjunctivae normal.   Cardiovascular:      Rate and Rhythm: Normal rate and regular rhythm.      Pulses: Normal pulses.      Heart sounds: Normal heart sounds.   Pulmonary:      Effort: Pulmonary effort is normal.      Breath sounds: Normal breath sounds.   Abdominal:      Palpations: Abdomen is soft.   Musculoskeletal:         General: Normal range of motion.      Cervical back: Normal range of motion and neck supple.   Skin:     General: Skin is warm and dry.   Neurological:      Mental Status: She is alert and oriented to person, place, and time.   Psychiatric:         Mood and Affect: Mood normal.         Behavior: Behavior normal.         Thought Content: Thought content normal.         Judgment: Judgment normal.              POCT Results (if applicable):  Results for orders placed or performed in visit on 12/05/22   T4, Free    Specimen: Arm, Left; Blood    Blood  Release to mil   Result Value Ref Range    Free T4 1.28 0.82 - 1.77 ng/dL   TSH    Specimen: Arm, Left; Blood    Blood  Release to mil   Result Value Ref Range    TSH 1.260 0.450 - 4.500 uIU/mL   Comprehensive Metabolic Panel     Specimen: Arm, Left; Blood    Blood  Release to mil   Result Value Ref Range    Glucose 128 (H) 70 - 99 mg/dL    BUN 12 8 - 27 mg/dL    Creatinine 0.71 0.57 - 1.00 mg/dL    EGFR Result 88 >59 mL/min/1.73    BUN/Creatinine Ratio 17 12 - 28    Sodium 144 134 - 144 mmol/L    Potassium 4.1 3.5 - 5.2 mmol/L    Chloride 105 96 - 106 mmol/L    Total CO2 25 20 - 29 mmol/L    Calcium 9.6 8.7 - 10.3 mg/dL    Total Protein 6.5 6.0 - 8.5 g/dL    Albumin 4.5 3.7 - 4.7 g/dL    Globulin 2.0 1.5 - 4.5 g/dL    A/G Ratio 2.3 (H) 1.2 - 2.2    Total Bilirubin 0.2 0.0 - 1.2 mg/dL    Alkaline Phosphatase 55 44 - 121 IU/L    AST (SGOT) 15 0 - 40 IU/L    ALT (SGPT) 11 0 - 32 IU/L   Lipid Panel    Specimen: Arm, Left; Blood    Blood  Release to mil   Result Value Ref Range    Total Cholesterol 149 100 - 199 mg/dL    Triglycerides 95 0 - 149 mg/dL    HDL Cholesterol 51 >39 mg/dL    VLDL Cholesterol Wilfrid 18 5 - 40 mg/dL    LDL Chol Calc (NIH) 80 0 - 99 mg/dL   CBC & Differential    Specimen: Arm, Left; Blood    Blood  Release to mil   Result Value Ref Range    WBC 9.5 3.4 - 10.8 x10E3/uL    RBC 4.22 3.77 - 5.28 x10E6/uL    Hemoglobin 13.7 11.1 - 15.9 g/dL    Hematocrit 40.2 34.0 - 46.6 %    MCV 95 79 - 97 fL    MCH 32.5 26.6 - 33.0 pg    MCHC 34.1 31.5 - 35.7 g/dL    RDW 11.8 11.7 - 15.4 %    Platelets 362 150 - 450 x10E3/uL    Neutrophil Rel % 47 Not Estab. %    Lymphocyte Rel % 43 Not Estab. %    Monocyte Rel % 7 Not Estab. %    Eosinophil Rel % 2 Not Estab. %    Basophil Rel % 1 Not Estab. %    Neutrophils Absolute 4.5 1.4 - 7.0 x10E3/uL    Lymphocytes Absolute 4.1 (H) 0.7 - 3.1 x10E3/uL    Monocytes Absolute 0.7 0.1 - 0.9 x10E3/uL    Eosinophils Absolute 0.2 0.0 - 0.4 x10E3/uL    Basophils Absolute 0.1 0.0 - 0.2 x10E3/uL    Immature Granulocyte Rel % 0 Not Estab. %    Immature Grans Absolute 0.0 0.0 - 0.1 x10E3/uL            Assessment and Plan     Diagnoses and all orders for this visit:    1. Generalized anxiety disorder  (Primary)  Assessment & Plan:  Earline has taken alprazolam 0.5mg daily for many years in regards to her anxiety with good benefit. Will continue for now. She is submitting urine drug screen today. No history of abuse or misuse.       2. Situational depression  Assessment & Plan:  Earline is having some issues with depression due to the death of her ex- last week. They continued to live together despite getting  three years ago. His death was sudden due to massive heart attack. Earline reports all she wants to do is cry. She has not suicidal or homicidal thoughts.     -Will initiate escitalopram 10mg daily  -RTC 4 weeks for follow-up      Other orders  -     escitalopram (Lexapro) 10 MG tablet; Take 1 tablet by mouth Daily.  Dispense: 30 tablet; Refill: 1    BMI is within normal parameters. No other follow-up for BMI required.          Follow Up  Return in about 4 weeks (around 1/31/2023) for Next scheduled follow up.    Nimo Hinds, APRN

## 2023-01-03 NOTE — ASSESSMENT & PLAN NOTE
Earline is having some issues with depression due to the death of her ex- last week. They continued to live together despite getting  three years ago. His death was sudden due to massive heart attack. Earline reports all she wants to do is cry. She has not suicidal or homicidal thoughts.     -Will initiate escitalopram 10mg daily  -RTC 4 weeks for follow-up

## 2023-01-04 RX ORDER — LEVOTHYROXINE SODIUM 0.05 MG/1
50 TABLET ORAL DAILY
Qty: 90 TABLET | Refills: 1 | Status: SHIPPED | OUTPATIENT
Start: 2023-01-04 | End: 2023-01-10 | Stop reason: SDUPTHER

## 2023-01-04 NOTE — TELEPHONE ENCOUNTER
Caller: Earline Santacruz    Relationship: Self    Best call back number: [phone]        Requested Prescriptions:   Requested Prescriptions     Pending Prescriptions Disp Refills   • levothyroxine (SYNTHROID, LEVOTHROID) 50 MCG tablet       Sig: Take 1 tablet by mouth Daily.        Pharmacy where request should be sent: [facility] [address] [phone] [facility] [phone] FX       Does the patient have less than a 3 day supply:  [x] Yes  [] No    Would you like a call back once the refill request has been completed: [] Yes [x] No    If the office needs to give you a call back, can they leave a voicemail: [] Yes [x] No    Jt Pisano Rep   01/04/23 09:37 EST

## 2023-01-04 NOTE — ASSESSMENT & PLAN NOTE
Earline has taken alprazolam 0.5mg daily for many years in regards to her anxiety with good benefit. Will continue for now. She is submitting urine drug screen today. No history of abuse or misuse.

## 2023-01-09 ENCOUNTER — TELEPHONE (OUTPATIENT)
Dept: FAMILY MEDICINE CLINIC | Facility: CLINIC | Age: 78
End: 2023-01-09
Payer: MEDICARE

## 2023-01-09 RX ORDER — ROSUVASTATIN CALCIUM 20 MG/1
20 TABLET, COATED ORAL DAILY
Qty: 90 TABLET | Refills: 2 | Status: SHIPPED | OUTPATIENT
Start: 2023-01-09 | End: 2023-01-10 | Stop reason: SDUPTHER

## 2023-01-09 NOTE — TELEPHONE ENCOUNTER
PT CALLED REQUESTING ROSUVASTATIN TO BE CALLED IN AS SHE IS OUT. SHE REQUEST IT TO BE SENT TO [facility]. ASAP

## 2023-01-10 RX ORDER — ROSUVASTATIN CALCIUM 20 MG/1
20 TABLET, COATED ORAL DAILY
Qty: 90 TABLET | Refills: 2 | Status: SHIPPED | OUTPATIENT
Start: 2023-01-10

## 2023-01-10 RX ORDER — LEVOTHYROXINE SODIUM 0.05 MG/1
50 TABLET ORAL DAILY
Qty: 90 TABLET | Refills: 1 | Status: SHIPPED | OUTPATIENT
Start: 2023-01-10

## 2023-01-30 DIAGNOSIS — F41.1 GENERALIZED ANXIETY DISORDER: ICD-10-CM

## 2023-01-30 RX ORDER — ALPRAZOLAM 0.5 MG/1
TABLET ORAL
Qty: 30 TABLET | Refills: 0 | Status: SHIPPED | OUTPATIENT
Start: 2023-01-30 | End: 2023-02-28 | Stop reason: SDUPTHER

## 2023-02-21 ENCOUNTER — TELEPHONE (OUTPATIENT)
Dept: FAMILY MEDICINE CLINIC | Facility: CLINIC | Age: 78
End: 2023-02-21
Payer: MEDICARE

## 2023-02-21 RX ORDER — LOSARTAN POTASSIUM 25 MG/1
12.5 TABLET ORAL DAILY
Qty: 45 TABLET | Refills: 2 | Status: SHIPPED | OUTPATIENT
Start: 2023-02-21

## 2023-02-22 ENCOUNTER — OFFICE VISIT (OUTPATIENT)
Dept: FAMILY MEDICINE CLINIC | Facility: CLINIC | Age: 78
End: 2023-02-22
Payer: MEDICARE

## 2023-02-22 VITALS
RESPIRATION RATE: 16 BRPM | HEART RATE: 81 BPM | DIASTOLIC BLOOD PRESSURE: 64 MMHG | BODY MASS INDEX: 22.97 KG/M2 | OXYGEN SATURATION: 95 % | TEMPERATURE: 97.3 F | HEIGHT: 62 IN | SYSTOLIC BLOOD PRESSURE: 102 MMHG | WEIGHT: 124.8 LBS

## 2023-02-22 DIAGNOSIS — F32.A ANXIETY AND DEPRESSION: Primary | ICD-10-CM

## 2023-02-22 DIAGNOSIS — F41.9 ANXIETY AND DEPRESSION: Primary | ICD-10-CM

## 2023-02-22 PROBLEM — F41.1 GENERALIZED ANXIETY DISORDER: Status: RESOLVED | Noted: 2022-11-01 | Resolved: 2023-02-22

## 2023-02-22 PROCEDURE — 99213 OFFICE O/P EST LOW 20 MIN: CPT | Performed by: NURSE PRACTITIONER

## 2023-02-22 RX ORDER — FLUOXETINE HYDROCHLORIDE 20 MG/1
20 CAPSULE ORAL DAILY
Qty: 30 CAPSULE | Refills: 3 | Status: SHIPPED | OUTPATIENT
Start: 2023-02-22 | End: 2023-02-22

## 2023-02-22 RX ORDER — FLUOXETINE HYDROCHLORIDE 20 MG/1
20 CAPSULE ORAL DAILY
Qty: 30 CAPSULE | Refills: 3 | Status: SHIPPED | OUTPATIENT
Start: 2023-02-22 | End: 2023-03-23

## 2023-02-22 NOTE — PROGRESS NOTES
"    Office Note     Name: Earline Santacruz    : 1945     MRN: [id number]     Chief Complaint  Depression (Follow up on lexapro, its not working she said.)    Subjective     History of Present Illness:  Earline Santacruz is a 78 y.o. female who presents today for follow-up after initiation of Lexapro 4 weeks ago.  Earline has had some significant changes in her personal life recently.  Her ex  with whom she still lived with passed away unexpectedly 2 months ago.  He has left her with a significant amount of debt with little income.  She is having conflict with his surviving children.  As a result she is dealing with some reactive depression and anxiety.  Since starting Lexapro 10 mg once daily 4 weeks ago she reports no benefit, with ongoing worry and sleep disturbance.  Biggest complaint being lack of motivation, reports \"laying on the couch constantly\".  No new complaints or concerns today.    Review of Systems   Constitutional: Positive for fatigue. Negative for activity change and appetite change.   Respiratory: Negative for cough, chest tightness and shortness of breath.    Cardiovascular: Negative for chest pain, palpitations and leg swelling.   Gastrointestinal: Negative for abdominal pain, constipation, diarrhea, nausea and vomiting.   Musculoskeletal: Negative for arthralgias and myalgias.   Neurological: Negative for dizziness, weakness, light-headedness, headache and memory problem.   Hematological: Does not bruise/bleed easily.   Psychiatric/Behavioral: Positive for agitation, sleep disturbance and depressed mood. Negative for self-injury and suicidal ideas. The patient is nervous/anxious.        Objective     Past Medical History:   Diagnosis Date   • Diabetes mellitus (HCC)    • Hyperlipidemia    • Hypertension    • Panic attacks      Past Surgical History:   Procedure Laterality Date   • APPENDECTOMY     • BREAST LUMPECTOMY      RIGHT    • CATARACT EXTRACTION, BILATERAL     • HAND " "SURGERY      LEFT    • TOTAL ABDOMINAL HYSTERECTOMY       Family History   Problem Relation Age of Onset   • No Known Problems Mother    • No Known Problems Father        Vital Signs  /64 (BP Location: Left arm, Patient Position: Sitting, Cuff Size: Adult)   Pulse 81   Temp 97.3 °F (36.3 °C) (Temporal)   Resp 16   Ht 156.2 cm (61.5\")   Wt 56.6 kg (124 lb 12.8 oz)   SpO2 95%   BMI 23.20 kg/m²   Estimated body mass index is 23.2 kg/m² as calculated from the following:    Height as of this encounter: 156.2 cm (61.5\").    Weight as of this encounter: 56.6 kg (124 lb 12.8 oz).    Physical Exam  Vitals reviewed.   Constitutional:       Appearance: Normal appearance.   HENT:      Head: Normocephalic and atraumatic.      Right Ear: Tympanic membrane, ear canal and external ear normal.      Left Ear: Tympanic membrane, ear canal and external ear normal.      Nose: Nose normal.      Mouth/Throat:      Pharynx: Oropharynx is clear.   Eyes:      Conjunctiva/sclera: Conjunctivae normal.   Cardiovascular:      Rate and Rhythm: Normal rate and regular rhythm.      Pulses: Normal pulses.      Heart sounds: Normal heart sounds.   Pulmonary:      Effort: Pulmonary effort is normal.      Breath sounds: Normal breath sounds.   Abdominal:      Palpations: Abdomen is soft.   Musculoskeletal:      Cervical back: Normal range of motion and neck supple.   Skin:     General: Skin is warm and dry.   Neurological:      Mental Status: She is alert and oriented to person, place, and time.   Psychiatric:         Mood and Affect: Mood normal.         Behavior: Behavior normal.         Thought Content: Thought content normal.         Judgment: Judgment normal.          POCT Results (if applicable):  Results for orders placed or performed in visit on 12/05/22   T4, Free    Specimen: Arm, Left; Blood    Blood  Release to [facility]   Result Value Ref Range    Free T4 1.28 0.82 - 1.77 ng/dL   TSH    Specimen: Arm, Left; Blood    Blood  Release " to mil   Result Value Ref Range    TSH 1.260 0.450 - 4.500 uIU/mL   Comprehensive Metabolic Panel    Specimen: Arm, Left; Blood    Blood  Release to mil   Result Value Ref Range    Glucose 128 (H) 70 - 99 mg/dL    BUN 12 8 - 27 mg/dL    Creatinine 0.71 0.57 - 1.00 mg/dL    EGFR Result 88 >59 mL/min/1.73    BUN/Creatinine Ratio 17 12 - 28    Sodium 144 134 - 144 mmol/L    Potassium 4.1 3.5 - 5.2 mmol/L    Chloride 105 96 - 106 mmol/L    Total CO2 25 20 - 29 mmol/L    Calcium 9.6 8.7 - 10.3 mg/dL    Total Protein 6.5 6.0 - 8.5 g/dL    Albumin 4.5 3.7 - 4.7 g/dL    Globulin 2.0 1.5 - 4.5 g/dL    A/G Ratio 2.3 (H) 1.2 - 2.2    Total Bilirubin 0.2 0.0 - 1.2 mg/dL    Alkaline Phosphatase 55 44 - 121 IU/L    AST (SGOT) 15 0 - 40 IU/L    ALT (SGPT) 11 0 - 32 IU/L   Lipid Panel    Specimen: Arm, Left; Blood    Blood  Release to mil   Result Value Ref Range    Total Cholesterol 149 100 - 199 mg/dL    Triglycerides 95 0 - 149 mg/dL    HDL Cholesterol 51 >39 mg/dL    VLDL Cholesterol Wilfrid 18 5 - 40 mg/dL    LDL Chol Calc (NIH) 80 0 - 99 mg/dL   CBC & Differential    Specimen: Arm, Left; Blood    Blood  Release to mil   Result Value Ref Range    WBC 9.5 3.4 - 10.8 x10E3/uL    RBC 4.22 3.77 - 5.28 x10E6/uL    Hemoglobin 13.7 11.1 - 15.9 g/dL    Hematocrit 40.2 34.0 - 46.6 %    MCV 95 79 - 97 fL    MCH 32.5 26.6 - 33.0 pg    MCHC 34.1 31.5 - 35.7 g/dL    RDW 11.8 11.7 - 15.4 %    Platelets 362 150 - 450 x10E3/uL    Neutrophil Rel % 47 Not Estab. %    Lymphocyte Rel % 43 Not Estab. %    Monocyte Rel % 7 Not Estab. %    Eosinophil Rel % 2 Not Estab. %    Basophil Rel % 1 Not Estab. %    Neutrophils Absolute 4.5 1.4 - 7.0 x10E3/uL    Lymphocytes Absolute 4.1 (H) 0.7 - 3.1 x10E3/uL    Monocytes Absolute 0.7 0.1 - 0.9 x10E3/uL    Eosinophils Absolute 0.2 0.0 - 0.4 x10E3/uL    Basophils Absolute 0.1 0.0 - 0.2 x10E3/uL    Immature Granulocyte Rel % 0 Not Estab. %    Immature Grans Absolute 0.0 0.0 - 0.1 x10E3/uL            Assessment  and Plan     Diagnoses and all orders for this visit:    1. Anxiety and depression (Primary)  Assessment & Plan:  Longstanding issues with anxiety, which has been historically well controlled with as needed 0.5 mg Xanax.  Most recently, she has the addition of situational depression along with her anxiety.  This occurred after the sudden death of her ex- whom she still lives with.  During her last visit 4 weeks ago we added lexapro 10 mg once daily to her regimen after she was having complaints of lack of motivation and tearfulness.  Today she reports no benefit from initiation of this medication.  No homicidal or suicidal thoughts, however she does continue to spend most of her time laying on the couch unmotivated.  We discussed possible benefits of therapy or counseling services, however she declines at this time.  -Discontinue Lexapro  -Trial of fluoxetine 20 mg once daily  -Follow-up in 4 weeks to evaluate efficacy    Orders:  -     Discontinue: FLUoxetine (PROzac) 20 MG capsule; Take 1 capsule by mouth Daily.  Dispense: 30 capsule; Refill: 3  -     FLUoxetine (PROzac) 20 MG capsule; Take 1 capsule by mouth Daily.  Dispense: 30 capsule; Refill: 3    BMI is within normal parameters. No other follow-up for BMI required.    I spent 30 minutes caring for Earline on this date of service. This time includes time spent by me in the following activities:preparing for the visit, performing a medically appropriate examination and/or evaluation , counseling and educating the patient/family/caregiver, ordering medications, tests, or procedures and documenting information in the medical record    Follow Up  Return in about 4 weeks (around 3/22/2023) for Next scheduled follow up.    Nimo Hinds, APRN

## 2023-02-27 NOTE — ASSESSMENT & PLAN NOTE
Longstanding issues with anxiety, which has been historically well controlled with as needed 0.5 mg Xanax.  Most recently, she has the addition of situational depression along with her anxiety.  This occurred after the sudden death of her ex- whom she still lives with.  During her last visit 4 weeks ago we added lexapro 10 mg once daily to her regimen after she was having complaints of lack of motivation and tearfulness.  Today she reports no benefit from initiation of this medication.  No homicidal or suicidal thoughts, however she does continue to spend most of her time laying on the couch unmotivated.  We discussed possible benefits of therapy or counseling services, however she declines at this time.  -Discontinue Lexapro  -Trial of fluoxetine 20 mg once daily  -Follow-up in 4 weeks to evaluate efficacy

## 2023-02-28 DIAGNOSIS — F41.1 GENERALIZED ANXIETY DISORDER: ICD-10-CM

## 2023-02-28 RX ORDER — ALPRAZOLAM 0.5 MG/1
0.5 TABLET ORAL NIGHTLY PRN
Qty: 30 TABLET | Refills: 0 | Status: SHIPPED | OUTPATIENT
Start: 2023-02-28 | End: 2023-03-23 | Stop reason: SDUPTHER

## 2023-02-28 NOTE — TELEPHONE ENCOUNTER
Caller: Earline Santacruz    Relationship: Self    Best call back number: [phone]    Requested Prescriptions:   Requested Prescriptions     Pending Prescriptions Disp Refills   • ALPRAZolam (XANAX) 0.5 MG tablet 30 tablet 0     Sig: Take 1 tablet by mouth At Night As Needed for Anxiety.        Pharmacy where request should be sent: [facility] [address] [facility] [phone] [facility] [phone] FX     Additional details provided by patient: SHE HAS BEEN OUT FOR SEVERAL DAYS AND HASN'T BEEN ABLE TO SLEEP. EX  PASSED AWAY SO SHE'S DEALING WITH THAT LOSS. NEEDS HELP TO COPE. SAYS SHE NEEDS TWO PER DAY INSTEAD OF ONE LIKE DR VASQUEZ USED TO PRESCRIBE PLEASE.    Does the patient have less than a 3 day supply:  [x] Yes  [] No    Would you like a call back once the refill request has been completed: [] Yes [x] No    If the office needs to give you a call back, can they leave a voicemail: [] Yes [x] No    Jt Betts Rep   02/28/23 10:47 EST

## 2023-03-01 RX ORDER — ESCITALOPRAM OXALATE 10 MG/1
TABLET ORAL
Qty: 30 TABLET | Refills: 0 | Status: SHIPPED | OUTPATIENT
Start: 2023-03-01 | End: 2023-03-23

## 2023-03-20 DIAGNOSIS — E11.40 TYPE 2 DIABETES MELLITUS WITH DIABETIC NEUROPATHY, WITHOUT LONG-TERM CURRENT USE OF INSULIN: ICD-10-CM

## 2023-03-21 RX ORDER — GABAPENTIN 300 MG/1
CAPSULE ORAL
Qty: 90 CAPSULE | Refills: 1 | Status: SHIPPED | OUTPATIENT
Start: 2023-03-21 | End: 2023-03-23 | Stop reason: SDUPTHER

## 2023-03-23 ENCOUNTER — OFFICE VISIT (OUTPATIENT)
Dept: FAMILY MEDICINE CLINIC | Facility: CLINIC | Age: 78
End: 2023-03-23
Payer: MEDICARE

## 2023-03-23 VITALS
TEMPERATURE: 97.2 F | HEART RATE: 79 BPM | BODY MASS INDEX: 22.93 KG/M2 | WEIGHT: 124.6 LBS | OXYGEN SATURATION: 98 % | DIASTOLIC BLOOD PRESSURE: 78 MMHG | SYSTOLIC BLOOD PRESSURE: 116 MMHG | RESPIRATION RATE: 18 BRPM | HEIGHT: 62 IN

## 2023-03-23 DIAGNOSIS — F32.A ANXIETY AND DEPRESSION: ICD-10-CM

## 2023-03-23 DIAGNOSIS — E11.40 TYPE 2 DIABETES MELLITUS WITH DIABETIC NEUROPATHY, WITHOUT LONG-TERM CURRENT USE OF INSULIN: ICD-10-CM

## 2023-03-23 DIAGNOSIS — E03.9 ACQUIRED HYPOTHYROIDISM: Primary | ICD-10-CM

## 2023-03-23 DIAGNOSIS — I10 PRIMARY HYPERTENSION: ICD-10-CM

## 2023-03-23 DIAGNOSIS — F41.9 ANXIETY AND DEPRESSION: ICD-10-CM

## 2023-03-23 PROBLEM — F43.21 SITUATIONAL DEPRESSION: Status: RESOLVED | Noted: 2023-01-03 | Resolved: 2023-03-23

## 2023-03-23 PROBLEM — R51.9 HEADACHE: Status: RESOLVED | Noted: 2019-11-17 | Resolved: 2023-03-23

## 2023-03-23 LAB
EXPIRATION DATE: NORMAL
HBA1C MFR BLD: 6 %
Lab: NORMAL

## 2023-03-23 RX ORDER — ALPRAZOLAM 0.5 MG/1
0.5 TABLET ORAL NIGHTLY PRN
Qty: 30 TABLET | Refills: 0 | Status: SHIPPED | OUTPATIENT
Start: 2023-03-23

## 2023-03-23 RX ORDER — GABAPENTIN 300 MG/1
300 CAPSULE ORAL 3 TIMES DAILY
Qty: 90 CAPSULE | Refills: 1 | Status: SHIPPED | OUTPATIENT
Start: 2023-03-23

## 2023-03-23 RX ORDER — FLUOXETINE HYDROCHLORIDE 40 MG/1
40 CAPSULE ORAL DAILY
Qty: 90 CAPSULE | Refills: 1 | Status: SHIPPED | OUTPATIENT
Start: 2023-03-23

## 2023-03-23 NOTE — ASSESSMENT & PLAN NOTE
Excellent control on current medications.  116/78 in office today.  Continue daily losartan 25 mg.  Encouraged to limit salt intake

## 2023-03-23 NOTE — ASSESSMENT & PLAN NOTE
Longstanding issues with anxiety and most recently situational depression after the sudden death of her ex- with whom she still resided.  Her anxiety has been well controlled with nightly alprazolam for many years.  Depression is situational in nature due to the day as mentioned above.  Initially started on Lexapro without any benefit.  This was followed by initiation of Prozac 20 mg once daily, which she states has provided her with excellent benefit with no longer wanting to sleep all day.  No longer even requiring midday nap.  She does feel she would benefit from having a job to keep her busy, is looking toward working at her local [employer].  Will increase fluoxetine to 40 mg to provide further benefit.  Continue current nightly Xanax

## 2023-03-23 NOTE — PROGRESS NOTES
Office Note     Name: Earline Santacruz    : 1945     MRN: [id number]     Chief Complaint  follow up medicine     Subjective     History of Present Illness:  Earline Santacruz is a 78 y.o. female who presents today for follow-up on chronic conditions including type 2 diabetes, hypothyroid, hypertension and anxiety/depression.  On her last visit 3 months ago she reported no benefit from initiation of Lexapro after the death of her ex- whom she still residing with.  This was followed by initiation of fluoxetine 20 mg once daily.  Today she notes good benefit from initiation of fluoxetine, not feeling the desire to sleep all day.  No longer even napping.  She does feel she would benefit from having something to do during the day so she is going to get a job at a local [employer].  Her blood pressure remains well controlled.  She checks her glucose roughly every other day, with lowest reading of 120 reported.  A1c in office today is 6.0%.  No recent issues with her chronic cystitis.  She has no complaints or concerns.  Review of Systems   Constitutional: Negative for activity change, appetite change and fatigue.   Eyes: Negative for blurred vision and double vision.   Respiratory: Negative for cough, choking, chest tightness, shortness of breath and wheezing.    Cardiovascular: Negative for chest pain, palpitations and leg swelling.   Gastrointestinal: Negative for abdominal distention, abdominal pain, constipation, diarrhea, nausea, vomiting and GERD.   Endocrine: Negative for polydipsia and polyuria.   Genitourinary: Negative for difficulty urinating, flank pain and hematuria.   Musculoskeletal: Positive for arthralgias. Negative for myalgias.   Skin: Negative for bruise.   Neurological: Negative for dizziness, facial asymmetry, weakness, numbness and headache.   Hematological: Does not bruise/bleed easily.   Psychiatric/Behavioral: Negative for depressed mood. The patient is not nervous/anxious.   "      Objective     Past Medical History:   Diagnosis Date   • Diabetes mellitus (HCC)    • Hyperlipidemia    • Hypertension    • Panic attacks      Past Surgical History:   Procedure Laterality Date   • APPENDECTOMY     • BREAST LUMPECTOMY      RIGHT    • CATARACT EXTRACTION, BILATERAL     • HAND SURGERY      LEFT    • TOTAL ABDOMINAL HYSTERECTOMY       Family History   Problem Relation Age of Onset   • No Known Problems Mother    • No Known Problems Father        Vital Signs  /78 (BP Location: Left arm, Patient Position: Sitting, Cuff Size: Adult)   Pulse 79   Temp 97.2 °F (36.2 °C) (Temporal)   Resp 18   Ht 156.2 cm (61.5\")   Wt 56.5 kg (124 lb 9.6 oz)   SpO2 98%   BMI 23.16 kg/m²   Estimated body mass index is 23.16 kg/m² as calculated from the following:    Height as of this encounter: 156.2 cm (61.5\").    Weight as of this encounter: 56.5 kg (124 lb 9.6 oz).    Physical Exam  Vitals reviewed.   Constitutional:       Appearance: Normal appearance.   HENT:      Head: Normocephalic and atraumatic.      Right Ear: Tympanic membrane, ear canal and external ear normal.      Left Ear: Tympanic membrane, ear canal and external ear normal.      Nose: Nose normal.      Mouth/Throat:      Mouth: Mucous membranes are moist.      Pharynx: Oropharynx is clear.   Eyes:      Conjunctiva/sclera: Conjunctivae normal.   Cardiovascular:      Rate and Rhythm: Normal rate and regular rhythm.      Pulses: Normal pulses.      Heart sounds: Normal heart sounds.   Pulmonary:      Effort: Pulmonary effort is normal.      Breath sounds: Normal breath sounds.   Abdominal:      Palpations: Abdomen is soft.   Musculoskeletal:      Cervical back: Normal range of motion and neck supple.   Skin:     General: Skin is warm and dry.   Neurological:      Mental Status: She is alert and oriented to person, place, and time.   Psychiatric:         Mood and Affect: Mood normal.         Behavior: Behavior normal.         Thought Content: " Thought content normal.         Judgment: Judgment normal.              POCT Results (if applicable):  Results for orders placed or performed in visit on 03/23/23   POC Glycosylated Hemoglobin (Hb A1C)    Specimen: Blood   Result Value Ref Range    Hemoglobin A1C 6.0 %    Lot Number [id number]     Expiration Date 11,142,024             Assessment and Plan     Diagnoses and all orders for this visit:    1. Acquired hypothyroidism (Primary)  Assessment & Plan:  Recently well controlled on 50 mcg levothyroxine once daily      2. Primary hypertension  Assessment & Plan:  Excellent control on current medications.  116/78 in office today.  Continue daily losartan 25 mg.  Encouraged to limit salt intake      3. Anxiety and depression  Assessment & Plan:  Longstanding issues with anxiety and most recently situational depression after the sudden death of her ex- with whom she still resided.  Her anxiety has been well controlled with nightly alprazolam for many years.  Depression is situational in nature due to the day as mentioned above.  Initially started on Lexapro without any benefit.  This was followed by initiation of Prozac 20 mg once daily, which she states has provided her with excellent benefit with no longer wanting to sleep all day.  No longer even requiring midday nap.  She does feel she would benefit from having a job to keep her busy, is looking toward working at her local [employer].  Will increase fluoxetine to 40 mg to provide further benefit.  Continue current nightly Xanax    Orders:  -     FLUoxetine (PROzac) 40 MG capsule; Take 1 capsule by mouth Daily.  Dispense: 90 capsule; Refill: 1  -     ALPRAZolam (XANAX) 0.5 MG tablet; Take 1 tablet by mouth At Night As Needed for Anxiety.  Dispense: 30 tablet; Refill: 0    4. Type 2 diabetes mellitus with diabetic neuropathy, without long-term current use of insulin ([facility])  Assessment & Plan:  Checking glucose at home every other day. Lowest reading 120.  She  is unable to recall what her highest glucose reading has been.  A1C 6.0%.  Not following any particular diet.  She does not exercise regularly.  Up-to-date with diabetic foot and eye exams.  We will continue metformin 1000 mg twice daily.    Orders:  -     POC Microalbumin  -     POC Glycosylated Hemoglobin (Hb A1C)  -     gabapentin (NEURONTIN) 300 MG capsule; Take 1 capsule by mouth 3 (Three) Times a Day.  Dispense: 90 capsule; Refill: 1    BMI is within normal parameters. No other follow-up for BMI required.    I spent 30 minutes caring for Earline on this date of service. This time includes time spent by me in the following activities:preparing for the visit, performing a medically appropriate examination and/or evaluation , counseling and educating the patient/family/caregiver, ordering medications, tests, or procedures and documenting information in the medical record      Follow Up  Return in about 3 months (around 6/23/2023) for Next scheduled follow up.    Nimo Hinds, APRN

## 2023-03-23 NOTE — ASSESSMENT & PLAN NOTE
Checking glucose at home every other day. Lowest reading 120.  She is unable to recall what her highest glucose reading has been.  A1C 6.0%.  Not following any particular diet.  She does not exercise regularly.  Up-to-date with diabetic foot and eye exams.  We will continue metformin 1000 mg twice daily.

## 2023-04-20 DIAGNOSIS — F41.9 ANXIETY AND DEPRESSION: ICD-10-CM

## 2023-04-20 DIAGNOSIS — F32.A ANXIETY AND DEPRESSION: ICD-10-CM

## 2023-04-20 RX ORDER — ALPRAZOLAM 0.5 MG/1
0.5 TABLET ORAL NIGHTLY PRN
Qty: 30 TABLET | Refills: 0 | Status: SHIPPED | OUTPATIENT
Start: 2023-04-20

## 2023-04-20 NOTE — TELEPHONE ENCOUNTER
"  Caller: Earline Santacruz \"Earline yoon\"    Relationship: Self    Best call back number: [phone]  Requested Prescriptions:   Requested Prescriptions     Pending Prescriptions Disp Refills   • ALPRAZolam (XANAX) 0.5 MG tablet 30 tablet 0     Sig: Take 1 tablet by mouth At Night As Needed for Anxiety.        Pharmacy where request should be sent: [place] [id number] [facility] [phone] [facility] [phone]      Last office visit with prescribing clinician: 3/23/2023   Last telemedicine visit with prescribing clinician: 6/23/2023   Next office visit with prescribing clinician: 6/23/2023     Additional details provided by patient: PATIENT OUT OF MEDICATION    Does the patient have less than a 3 day supply:  [x] Yes  [] No    Would you like a call back once the refill request has been completed: [x] Yes [] No    If the office needs to give you a call back, can they leave a voicemail: [x] Yes [] No    Jt Gonzalez Rep   04/20/23 11:52 EDT           "

## 2023-05-16 DIAGNOSIS — F32.A ANXIETY AND DEPRESSION: ICD-10-CM

## 2023-05-16 DIAGNOSIS — E11.40 TYPE 2 DIABETES MELLITUS WITH DIABETIC NEUROPATHY, WITHOUT LONG-TERM CURRENT USE OF INSULIN: ICD-10-CM

## 2023-05-16 DIAGNOSIS — F41.9 ANXIETY AND DEPRESSION: ICD-10-CM

## 2023-05-16 RX ORDER — ASPIRIN 81 MG/1
81 TABLET ORAL DAILY
Qty: 30 TABLET | Refills: 2 | Status: SHIPPED | OUTPATIENT
Start: 2023-05-16

## 2023-05-16 RX ORDER — UBIDECARENONE 100 MG
100 CAPSULE ORAL DAILY
Qty: 30 CAPSULE | Refills: 2 | Status: SHIPPED | OUTPATIENT
Start: 2023-05-16

## 2023-05-16 RX ORDER — ALPRAZOLAM 0.5 MG/1
0.5 TABLET ORAL NIGHTLY PRN
Qty: 30 TABLET | Refills: 0 | Status: SHIPPED | OUTPATIENT
Start: 2023-05-16

## 2023-05-16 RX ORDER — GABAPENTIN 300 MG/1
300 CAPSULE ORAL 3 TIMES DAILY
Qty: 90 CAPSULE | Refills: 1 | Status: SHIPPED | OUTPATIENT
Start: 2023-05-16

## 2023-05-16 RX ORDER — LEVOTHYROXINE SODIUM 0.05 MG/1
50 TABLET ORAL DAILY
Qty: 90 TABLET | Refills: 1 | Status: SHIPPED | OUTPATIENT
Start: 2023-05-16

## 2023-06-16 DIAGNOSIS — F32.A ANXIETY AND DEPRESSION: ICD-10-CM

## 2023-06-16 DIAGNOSIS — F41.9 ANXIETY AND DEPRESSION: ICD-10-CM

## 2023-06-16 RX ORDER — ALPRAZOLAM 0.5 MG/1
TABLET ORAL
Qty: 30 TABLET | Refills: 0 | Status: SHIPPED | OUTPATIENT
Start: 2023-06-16

## 2023-06-16 NOTE — TELEPHONE ENCOUNTER
Patient:   STEFFEN ELLSWORTH            MRN: [facility]-[id number]            FIN: [id number]              Age:   39 years     Sex:  FEMALE     :  [birth year]   Associated Diagnoses:   None   Author:   ZACK, BRANT PETERS     Critical Care Attending: Dr. JUNE Cm  S: intubated  O:  Vitals reviewed  Gen: NAD  HEENT: normocephalic  CV: RR  Resp: intubated, on vent  Abd: soft nt nd  Ext: no gross deformities, no cyanosis  Neuro: eyes open, moved LUE, did not follow commands  Skin: dry intact  Labs and images reviewed  A/P: 38 yo female w/ poorly controlled HTN and DM with acute L IVH and IPH  Neuro: Neurosurg following.  Continuing to follow off sedation.  CT heads - to obtain per Neurosug.  EVD in place - per Neurosurg, monitor ICPs.  CV: poorly controlled HTN - maintain -160. Required pressors while on HD.  Off pressors and Cardene gtt at this time, cont po meds.  Pulm: acute hypoxic and hypercapneic respiratory failure - with ARDS and aspiration - needed high vent settings to maintain adequate oxygenation and ventilation, cont to wean as tolerated, is now off paralytics. CT chest  - c/w aspiration and ARDS. Bronchospasm and possible asthma- cont nebs and steroids; scopolamine patch for secretions.  Today's CXR and ABG- reviewed. s/p Bronch 12/2- +Enterobacter, on Abx. Trach on hold.  GI: Tube feeds for nutriton. PPI. PEG on hold.   Renal: [place], oliguria - suspect hypoperfusion of kidneys and ATN as cause - monitor Cr and UO - did not respond to Lasix gtt, Nephro consulted, on HD  Lytes: monitor and correct as needed  Endo: poorly controlled DM - Insulin for hyperglycemia  Heme: anemia, acute on chronic - monitor  ID: Abx for aspiration PNA were completed previously.  Required pressors briefly, leukocytosis increased - BAL / +Enterobacter, on Abx per sensitivities.  DVT proph- holding chemical dvt proph until cleared by Neurosurg, SCDs  Trach/PEG pending; F/u labs pending for   I have personally spent 35  He last appt with Nimo was on 3/23/2023. She does have a follow up scheduled for 6/30/2023.  The last refill was done on 5/16/2023. TF   minutes of critical care time with this patient, not including procedure time, on the evaluation, examination, data/chart review, and review of relevant imaging, and discussion with team.  I have seen and evaluated this patient with the multidisciplinary ICU team which includes the ICU fellow, residents, respiratory therapist, nurses, pharmacists, among others. Dr. JUNE Cm

## 2023-06-30 PROBLEM — Z00.00 MEDICARE ANNUAL WELLNESS VISIT, SUBSEQUENT: Status: ACTIVE | Noted: 2023-06-30

## 2023-07-27 ENCOUNTER — OFFICE VISIT (OUTPATIENT)
Dept: FAMILY MEDICINE CLINIC | Facility: CLINIC | Age: 78
End: 2023-07-27
Payer: MEDICARE

## 2023-07-27 VITALS
TEMPERATURE: 98.4 F | DIASTOLIC BLOOD PRESSURE: 68 MMHG | HEIGHT: 62 IN | SYSTOLIC BLOOD PRESSURE: 98 MMHG | WEIGHT: 118.8 LBS | BODY MASS INDEX: 21.86 KG/M2 | HEART RATE: 80 BPM

## 2023-07-27 DIAGNOSIS — J01.00 ACUTE NON-RECURRENT MAXILLARY SINUSITIS: Primary | ICD-10-CM

## 2023-07-27 PROBLEM — J01.10 ACUTE NON-RECURRENT FRONTAL SINUSITIS: Status: ACTIVE | Noted: 2023-07-27

## 2023-07-27 PROCEDURE — 3078F DIAST BP <80 MM HG: CPT | Performed by: INTERNAL MEDICINE

## 2023-07-27 PROCEDURE — 3074F SYST BP LT 130 MM HG: CPT | Performed by: INTERNAL MEDICINE

## 2023-07-27 PROCEDURE — 99213 OFFICE O/P EST LOW 20 MIN: CPT | Performed by: INTERNAL MEDICINE

## 2023-07-27 RX ORDER — AMOXICILLIN 875 MG/1
875 TABLET, COATED ORAL 2 TIMES DAILY
Qty: 20 TABLET | Refills: 0 | Status: SHIPPED | OUTPATIENT
Start: 2023-07-27

## 2023-07-27 RX ORDER — PREDNISONE 10 MG/1
20 TABLET ORAL DAILY
Qty: 10 TABLET | Refills: 0 | Status: SHIPPED | OUTPATIENT
Start: 2023-07-27 | End: 2023-08-01

## 2023-07-27 NOTE — PROGRESS NOTES
"    Office Note     Name: Earline Santacruz    : 1945     MRN: [id number]     Chief Complaint  No chief complaint on file.    Subjective     History of Present Illness:  Earline Santacruz is a 78 y.o. female who presents today for for acute visit.  Regular patient of Nimo Hinds.  Onset the last handful of days, may be a week of some mild congestion drainage, sneezing, not feeling poorly, with good energy and appetite, no fevers.  Comfortable breathing pattern.  Over the last day or so increase in thickening and discoloration of the sinus drainage with more facial pressure especially in the maxillary region.  Again no shortness of breath or chest tightness.  She does feel a little bit under the weather but not significantly so.  A little bit of ear pressure and popping.    Review of Systems    Objective     Past Medical History:   Diagnosis Date    Diabetes mellitus     Hyperlipidemia     Hypertension     Panic attacks      Past Surgical History:   Procedure Laterality Date    APPENDECTOMY      BREAST LUMPECTOMY      RIGHT     CATARACT EXTRACTION, BILATERAL      HAND SURGERY      LEFT     TOTAL ABDOMINAL HYSTERECTOMY       Family History   Problem Relation Age of Onset    No Known Problems Mother     No Known Problems Father        Vital Signs  BP 98/68   Pulse 80   Temp 98.4 °F (36.9 °C) (Temporal)   Ht 156.2 cm (61.5\")   Wt 53.9 kg (118 lb 12.8 oz)   BMI 22.08 kg/m²   Estimated body mass index is 22.08 kg/m² as calculated from the following:    Height as of this encounter: 156.2 cm (61.5\").    Weight as of this encounter: 53.9 kg (118 lb 12.8 oz).    Physical Exam  Constitutional:       General: She is not in acute distress.     Appearance: Normal appearance. She is not ill-appearing, toxic-appearing or diaphoretic.   HENT:      Head: Normocephalic and atraumatic.      Right Ear: Ear canal and external ear normal.      Left Ear: Tympanic membrane, ear canal and external ear normal.      Ears:     "  Comments: Ems bilaterally, otherwise clear     Nose: Nose normal. Rhinorrhea present.      Comments: Moderate congested rhinorrhea with irritated mucosa and nares noted.  Mild tenderness in the maxillary sinuses bilaterally.     Mouth/Throat:      Mouth: Mucous membranes are moist.      Pharynx: Oropharynx is clear. No oropharyngeal exudate or posterior oropharyngeal erythema.   Eyes:      Extraocular Movements: Extraocular movements intact.      Conjunctiva/sclera: Conjunctivae normal.      Pupils: Pupils are equal, round, and reactive to light.   Neck:      Vascular: No carotid bruit.   Cardiovascular:      Rate and Rhythm: Normal rate and regular rhythm.      Pulses: Normal pulses.      Heart sounds: Normal heart sounds. No murmur heard.    No friction rub. No gallop.   Pulmonary:      Effort: Pulmonary effort is normal. No respiratory distress.      Breath sounds: Normal breath sounds. No stridor. No wheezing.   Musculoskeletal:      Cervical back: Neck supple. No tenderness.   Lymphadenopathy:      Cervical: No cervical adenopathy.   Skin:     General: Skin is warm and dry.      Capillary Refill: Capillary refill takes less than 2 seconds.   Neurological:      General: No focal deficit present.      Mental Status: She is alert and oriented to person, place, and time. Mental status is at baseline.   Psychiatric:         Mood and Affect: Mood normal.         Behavior: Behavior normal.         Thought Content: Thought content normal.                 POCT Results (if applicable):  Results for orders placed or performed in visit on 03/23/23   POC Glycosylated Hemoglobin (Hb A1C)    Specimen: Blood   Result Value Ref Range    Hemoglobin A1C 6.0 %    Lot Number [id number]     Expiration Date 11,142,024             Assessment and Plan     Diagnoses and all orders for this visit:    1. Acute non-recurrent maxillary sinusitis (Primary)  Assessment & Plan:  Almost a week of some modest congestion drainage and a little bit  of sneezing but otherwise acting quite well which transition the last day or so to increased thickening discoloration and some facial pressure.  Most consistent with sinusitis pattern.  Initiate prednisone 10 mg tablet 2 tabs daily x5 days, amoxicillin 875 mg twice daily x10 days.  Saline spray, cool-mist humidifier, nasal flushing.  Expected course of gradual improvement over the next few days.  Advise any worsening.    Orders:  -     predniSONE (DELTASONE) 10 MG tablet; Take 2 tablets by mouth Daily for 5 days.  Dispense: 10 tablet; Refill: 0  -     amoxicillin (AMOXIL) 875 MG tablet; Take 1 tablet by mouth 2 (Two) Times a Day.  Dispense: 20 tablet; Refill: 0      BMI is within normal parameters. No other follow-up for BMI required.    Follow Up  No follow-ups on file.    Basil Randhawa MD

## 2023-07-27 NOTE — ASSESSMENT & PLAN NOTE
Almost a week of some modest congestion drainage and a little bit of sneezing but otherwise acting quite well which transition the last day or so to increased thickening discoloration and some facial pressure.  Most consistent with sinusitis pattern.  Initiate prednisone 10 mg tablet 2 tabs daily x5 days, amoxicillin 875 mg twice daily x10 days.  Saline spray, cool-mist humidifier, nasal flushing.  Expected course of gradual improvement over the next few days.  Advise any worsening.

## 2023-08-16 DIAGNOSIS — F32.A ANXIETY AND DEPRESSION: ICD-10-CM

## 2023-08-16 DIAGNOSIS — F41.9 ANXIETY AND DEPRESSION: ICD-10-CM

## 2023-08-16 NOTE — TELEPHONE ENCOUNTER
"  Caller: Earline Santacruz \"Earline yoon\"    Relationship: Self    Best call back number: [phone]    Requested Prescriptions:   Requested Prescriptions     Pending Prescriptions Disp Refills    ALPRAZolam (Xanax) 1 MG tablet 60 tablet 0     Sig: Take 1 tablet by mouth 2 (Two) Times a Day As Needed for Anxiety.        Pharmacy where request should be sent: [facility] [address] [facility] [phone] [facility] [phone]      Last office visit with prescribing clinician: 6/30/2023   Last telemedicine visit with prescribing clinician: Visit date not found   Next office visit with prescribing clinician: Visit date not found     Additional details provided by patient: PATIENT IS ASKING FOR .5 MG NOT 1 MG     Does the patient have less than a 3 day supply:  [x] Yes  [] No    Jt Walker Rep   08/16/23 11:25 EDT             "

## 2023-08-17 RX ORDER — ALPRAZOLAM 1 MG/1
1 TABLET ORAL 2 TIMES DAILY PRN
Qty: 60 TABLET | Refills: 0 | Status: SHIPPED | OUTPATIENT
Start: 2023-08-17

## 2023-09-21 ENCOUNTER — OFFICE VISIT (OUTPATIENT)
Dept: FAMILY MEDICINE CLINIC | Facility: CLINIC | Age: 78
End: 2023-09-21
Payer: MEDICARE

## 2023-09-21 VITALS
HEIGHT: 62 IN | OXYGEN SATURATION: 97 % | WEIGHT: 117.6 LBS | HEART RATE: 72 BPM | TEMPERATURE: 97.2 F | DIASTOLIC BLOOD PRESSURE: 76 MMHG | RESPIRATION RATE: 18 BRPM | BODY MASS INDEX: 21.64 KG/M2 | SYSTOLIC BLOOD PRESSURE: 118 MMHG

## 2023-09-21 DIAGNOSIS — F32.A ANXIETY AND DEPRESSION: ICD-10-CM

## 2023-09-21 DIAGNOSIS — E78.5 DYSLIPIDEMIA: ICD-10-CM

## 2023-09-21 DIAGNOSIS — F41.9 ANXIETY AND DEPRESSION: ICD-10-CM

## 2023-09-21 DIAGNOSIS — G47.33 OBSTRUCTIVE SLEEP APNEA: ICD-10-CM

## 2023-09-21 DIAGNOSIS — I25.10 CORONARY ARTERY DISEASE INVOLVING NATIVE CORONARY ARTERY OF NATIVE HEART WITHOUT ANGINA PECTORIS: ICD-10-CM

## 2023-09-21 DIAGNOSIS — K21.9 GASTROESOPHAGEAL REFLUX DISEASE WITHOUT ESOPHAGITIS: ICD-10-CM

## 2023-09-21 DIAGNOSIS — I10 PRIMARY HYPERTENSION: ICD-10-CM

## 2023-09-21 DIAGNOSIS — E11.42 TYPE 2 DIABETES MELLITUS WITH DIABETIC POLYNEUROPATHY, WITHOUT LONG-TERM CURRENT USE OF INSULIN: Primary | ICD-10-CM

## 2023-09-21 RX ORDER — OMEPRAZOLE 40 MG/1
40 CAPSULE, DELAYED RELEASE ORAL DAILY
Qty: 90 CAPSULE | Refills: 1 | Status: SHIPPED | OUTPATIENT
Start: 2023-09-21

## 2023-09-21 NOTE — ASSESSMENT & PLAN NOTE
Patient with history of mild obstructive sleep apnea, she does not utilize CPAP as ordered in the past.  Denies any snoring, choking in sleep, startling awake or excessive daytime fatigue

## 2023-09-21 NOTE — PROGRESS NOTES
Venipuncture Blood Specimen Collection  Venipuncture performed in left arm by Reyna Mojica MA with good hemostasis. Patient tolerated the procedure well without complications.   09/21/23   Reyna Mojica MA

## 2023-09-21 NOTE — ASSESSMENT & PLAN NOTE
Patient complained of new onset heartburn, almost daily last week.  However she notes frequency has subsided, she has not suffered epigastric discomfort and burning for the last 4 days.  When she did have episodes it was responsive to Rolaids and Tums.  She noticed most of the episodes happened while at work.  She denies any increased intake of spicy or fatty foods.  -Will initiate omeprazole 40 mg once daily

## 2023-09-21 NOTE — ASSESSMENT & PLAN NOTE
Patient has not been checking her glucose at home, has been very well controlled for quite a while, last checked being 6% A1c.  We will check with labs today.  She utilizes metformin 1000 mg twice daily.

## 2023-09-21 NOTE — ASSESSMENT & PLAN NOTE
Blood pressure well controlled on current medications, 119/76.  -Continue losartan 25 mg once daily

## 2023-09-21 NOTE — ASSESSMENT & PLAN NOTE
Patient has had a difficult year with the death of both her  and daughter over the last 9 months.  She was initiated on fluoxetine and Wellbutrin to deal with the initial greiving period.  Today she tells me she has stopped antidepressants and continues to do well without medications in regards to mood stability.  She has gotten a job as a  at [employer] which she feels helps keep her busy.  She can continues to use her alprazolam 0.5 mg twice daily as needed for breakthrough anxiety.  Trev report reviewed and satisfactory, will continue as ordered

## 2023-09-21 NOTE — PROGRESS NOTES
Office Note     Name: Earline Santacruz    : 1945     MRN: [id number]     Chief Complaint  Follow-up    Subjective     History of Present Illness:  Earline Santacruz is a 78 y.o. female who presents today for follow-up on chronic conditions including type 2 diabetes, ESTHER, hypertension, dyslipidemia, coronary artery disease and anxiety/depression.  Earline has had a rough year losing both her  and daughter over the last year.  Today she tells me she has gotten a job as a  at [employer] which has helped her mood tremendously.  She has stopped taking her daily bupropion and fluoxetine and has continued to do well in regards to anxiety and depression.  She reports no sleeping difficulties.  Blood pressure is well controlled, 118/76.  She does not utilize CPAP mask as directed but no complaints of fatigue or snoring.  She does not check her glucose at home, but historically remains well controlled.  She does have new onset of heartburn, she notes occurring mostly while at work.  Her symptom responds well to use of Rolaids and Tums.  She does note the symptom has not occurred over the last 3 to 4 days.  She denies any chest pain, shortness of breath or lower extremity edema.  No shoulder or jaw pain.  She has no further complaints or concerns today    Review of Systems   Constitutional:  Negative for activity change, appetite change and fatigue.   Eyes:  Negative for visual disturbance.   Respiratory:  Negative for cough, choking, chest tightness, shortness of breath and wheezing.    Cardiovascular:  Negative for chest pain, palpitations and leg swelling.   Gastrointestinal:  Positive for GERD and indigestion. Negative for abdominal pain, constipation, diarrhea, nausea and vomiting.   Endocrine: Negative for polydipsia and polyuria.   Genitourinary:  Positive for urinary incontinence. Negative for difficulty urinating.   Musculoskeletal:  Negative for arthralgias and myalgias.   Skin:  Negative  HISTORY OF PRESENTING ILLNESS      Rita Morel is a 60 y.o. male who underwent AFL ablation in the past with subsequent recurrence and repeat CTI ablation last month whose monitor shows recurrent AFL (as well as PVCs). He is very symptomatic with HERRERA. Has been compliant with medications including anticoagulation since his procedure.   [facility] then underwent repeat atypical/ right atrial flutter ablation (An area of slow conduction mid-RA was identified with delayed/fractionated potentials where ablation was performed and resulted in termination of the tachycardia nearly immediately following onset of RF energy. Bidirectional conduction block was demonstrated across the CTI).    Repeat monitor report with atypical atrial flutter and PVCs. He was  asymptomatic- feeling improvement since his procedure. Instructed him to restart his Diltiazem 120 mg daily and underwent repeat atypical flutter ablation. Reported discomfort in left chest recently. He has noted fatigue with exertion recently. Tore his right hamstring yesterday.  EKG today shows AF with RVR at 133 bpm.        PAST MEDICAL HISTORY     Past Medical History:   Diagnosis Date    Abnormal echocardiogram 10/28/2010    Atrial flutter (HCC)     s/p ablation     Dyslipidemia     Gout     High blood pressure     High blood pressure     Obesity 10/28/2010    Prediabetes     Prostate cancer ([address]     S/P ablation of atrial flutter     Supraventricular tachycardia ([address] 10/28/2010           PAST SURGICAL HISTORY     Past Surgical History:   Procedure Laterality Date    HX ACL RECONSTRUCTION  6/2013    menniscus    HX ROTATOR CUFF REPAIR Left 2005    HX ROTATOR CUFF REPAIR Right 2007    HX WISDOM TEETH EXTRACTION      INTRACARD ECHO, THER/DX INTERVENT N/A 1/7/2021    Intracardiac Echocardiogram performed by Anne-Marie Knox MD at [address] [place] [address], [facility] CATH LAB    [place] CARDIAC SURG PROCEDURE UNLIST  2016    cardiac ablation    [place] COMPRE ELECTROPHYSIOL "for rash and bruise.   Neurological:  Negative for dizziness, weakness, light-headedness and headache.   Psychiatric/Behavioral:  Positive for stress. Negative for agitation, dysphoric mood, self-injury, sleep disturbance and depressed mood. The patient is nervous/anxious.      Objective     Past Medical History:   Diagnosis Date    Diabetes mellitus     Hyperlipidemia     Hypertension     Panic attacks      Past Surgical History:   Procedure Laterality Date    APPENDECTOMY      BREAST LUMPECTOMY      RIGHT     CATARACT EXTRACTION, BILATERAL      HAND SURGERY      LEFT     TOTAL ABDOMINAL HYSTERECTOMY       Family History   Problem Relation Age of Onset    No Known Problems Mother     No Known Problems Father        Vital Signs  /76 (BP Location: Left arm, Patient Position: Sitting, Cuff Size: Adult)   Pulse 72   Temp 97.2 °F (36.2 °C) (Temporal)   Resp 18   Ht 156.2 cm (61.5\") Comment: patient reported  Wt 53.3 kg (117 lb 9.6 oz)   SpO2 97%   BMI 21.86 kg/m²   Estimated body mass index is 21.86 kg/m² as calculated from the following:    Height as of this encounter: 156.2 cm (61.5\").    Weight as of this encounter: 53.3 kg (117 lb 9.6 oz).    Physical Exam  Vitals reviewed.   Constitutional:       Appearance: Normal appearance.   HENT:      Head: Normocephalic and atraumatic.      Right Ear: Tympanic membrane, ear canal and external ear normal.      Left Ear: Tympanic membrane, ear canal and external ear normal.      Nose: Nose normal.      Mouth/Throat:      Mouth: Mucous membranes are moist.      Pharynx: Oropharynx is clear.   Eyes:      Conjunctiva/sclera: Conjunctivae normal.      Pupils: Pupils are equal, round, and reactive to light.   Cardiovascular:      Rate and Rhythm: Normal rate and regular rhythm.      Pulses: Normal pulses.      Heart sounds: Normal heart sounds.   Pulmonary:      Effort: Pulmonary effort is normal.      Breath sounds: Normal breath sounds.   Abdominal:      General: " XM W/LEFT ATRIAL PACNG/REC N/A 12/22/2020    Lt Atrial Pace & Record During Ep Study performed by Murphy Howe MD at [place] [address], [facility] CATH LAB    [place] COMPRE ELECTROPHYSIOL XM W/LEFT ATRIAL PACNG/REC N/A 1/26/2021    Lt Atrial Pace & Record During Ep Study performed by Murphy Howe MD at [place] [address], [facility] CATH LAB    [place] EPHYS EVAL W/ABLATION [facility] ARRHYTHMIA N/A 12/22/2020    ABLATION SVT performed by Murphy Howe MD at [place] [address], [facility] CATH LAB    [place] EPHYS EVAL W/ABLATION SUPRAVENT ARRHYTHMIA N/A 1/7/2021    Ablation A-Flutter performed by Murphy Howe MD at [place] [address], [facility] CATH LAB    [place] EPHYS EVAL W/ABLATION SUPRAVENT ARRHYTHMIA N/A 1/26/2021    Ablation A-Flutter performed by Murphy Howe MD at [place] [address], [facility] CATH LAB    [place] INTRACARDIAC ELECTROPHYSIOLOGIC 3D MAPPING N/A 12/22/2020    Ep 3d Mapping performed by Murphy Howe MD at [place] [address], [facility] CATH LAB    [place] INTRACARDIAC ELECTROPHYSIOLOGIC 3D MAPPING N/A 1/7/2021    Ep 3d Mapping performed by Murphy Howe MD at [place] [address], [facility] CATH LAB    [place] INTRACARDIAC ELECTROPHYSIOLOGIC 3D MAPPING N/A 1/26/2021    Ep 3d Mapping performed by Murphy Howe MD at [place] [address], [facility] CATH LAB          ALLERGIES     Allergies   Allergen Reactions    Percocet [Oxycodone-Acetaminophen] Itching     Pt has used hydrocodone          FAMILY HISTORY     Family History   Problem Relation Age of Onset    No Known Problems Mother     No Known Problems Father     Cancer Sister 43        breast    No Known Problems Sister     negative for cardiac disease       SOCIAL HISTORY     Social History     Socioeconomic History    Marital status: SINGLE     Spouse name: Not on file    Number of children: Not on file    Years of education: Not on file    Highest education level: Not on file   Tobacco Use    Smoking status: Never Smoker    Smokeless tobacco: Never Used   Substance and Sexual Activity    Alcohol use:  Yes     Alcohol/week: 12.0 standard drinks     Types: 12 Cans of beer per week Bowel sounds are normal.      Palpations: Abdomen is soft.   Musculoskeletal:         General: Normal range of motion.      Cervical back: Neck supple.   Skin:     General: Skin is warm and dry.   Neurological:      Mental Status: She is alert and oriented to person, place, and time.   Psychiatric:         Mood and Affect: Mood normal.         Behavior: Behavior normal.         Thought Content: Thought content normal.         Judgment: Judgment normal.           POCT Results (if applicable):  Results for orders placed or performed in visit on 03/23/23   POC Glycosylated Hemoglobin (Hb A1C)    Specimen: Blood   Result Value Ref Range    Hemoglobin A1C 6.0 %    Lot Number [id number]     Expiration Date 11,142,024             Assessment and Plan     Diagnoses and all orders for this visit:    1. Type 2 diabetes mellitus with diabetic polyneuropathy, without long-term current use of insulin (Primary)  Assessment & Plan:  Patient has not been checking her glucose at home, has been very well controlled for quite a while, last checked being 6% A1c.  We will check with labs today.  She utilizes metformin 1000 mg twice daily.    Orders:  -     MicroAlbumin, Urine, Random - Urine, Clean Catch; Future  -     MicroAlbumin, Urine, Random - Urine, Clean Catch  -     Hemoglobin A1c  -     Comprehensive Metabolic Panel  -     CBC & Differential    2. Anxiety and depression  Assessment & Plan:  Patient has had a difficult year with the death of both her  and daughter over the last 9 months.  She was initiated on fluoxetine and Wellbutrin to deal with the initial greiving period.  Today she tells me she has stopped antidepressants and continues to do well without medications in regards to mood stability.  She has gotten a job as a  at [employer] which she feels helps keep her busy.  She can continues to use her alprazolam 0.5 mg twice daily as needed for breakthrough anxiety.  Trev report reviewed and satisfactory, will  Comment: reports not drinking during the week, only on weekends    Drug use: No     Social Determinants of Health     Financial Resource Strain:     Difficulty of Paying Living Expenses:    Food Insecurity:     Worried About Running Out of Food in the Last Year:     [address] in the Last Year:    Transportation Needs:     Lack of Transportation (Medical):  Lack of Transportation (Non-Medical):    Physical Activity:     Days of Exercise per Week:     Minutes of Exercise per Session:    Stress:     Feeling of Stress :    Social Connections:     Frequency of Communication with Friends and Family:     Frequency of Social Gatherings with Friends and Family:     Attends Gnosticist Services:     Active Member of Clubs or Organizations:     Attends Club or Organization Meetings:     Marital Status:          MEDICATIONS     Current Outpatient Medications   Medication Sig    apixaban (Eliquis) 5 mg tablet Take 1 Tab by mouth two (2) times a day.  dilTIAZem ER (DILACOR XR) 120 mg capsule Take 1 Cap by mouth daily.  ibuprofen (MOTRIN) 600 mg tablet Take 600 mg by mouth every six (6) hours as needed for Pain.  allopurinoL (ZYLOPRIM) 100 mg tablet Take 100 mg by mouth daily. Switched from [place] to allopurinol on 7/17/2020     No current facility-administered medications for this visit. I have reviewed the nurses notes, vitals, problem list, allergy list, medical history, family, social history and medications. REVIEW OF SYMPTOMS      General: Pt denies excessive weight gain or loss. Pt is able to conduct ADL's  HEENT: Denies blurred vision, headaches, hearing loss, epistaxis and difficulty swallowing. Respiratory: Denies cough, congestion, shortness of breath, HERRERA, wheezing or stridor.   Cardiovascular: Denies precordial pain, palpitations, edema or PND  Gastrointestinal: Denies poor appetite, indigestion, abdominal pain or blood in stool  Genitourinary: Denies hematuria, dysuria, continue as ordered    Orders:  -     Urine Drug Screen - Urine, Clean Catch; Future  -     Urine Drug Screen - Urine, Clean Catch    3. Obstructive sleep apnea  Assessment & Plan:  Patient with history of mild obstructive sleep apnea, she does not utilize CPAP as ordered in the past.  Denies any snoring, choking in sleep, startling awake or excessive daytime fatigue      4. Primary hypertension  Assessment & Plan:  Blood pressure well controlled on current medications, 119/76.  -Continue losartan 25 mg once daily    Orders:  -     TSH Rfx On Abnormal To Free T4    5. Coronary artery disease involving native coronary artery of native heart without angina pectoris  Assessment & Plan:  Patient underwent cardiac cath in April 2015 with findings of significant coronary artery disease involving the second diagonal branch.  Unfortunately vessel was too small for intervention so she was medically managed with Imdur 30 and 81 mg aspirin for quite some long time.  Now only continues to take aspirin daily.  Followed by Dr. Kenyon      6. Gastroesophageal reflux disease without esophagitis  Assessment & Plan:  Patient complained of new onset heartburn, almost daily last week.  However she notes frequency has subsided, she has not suffered epigastric discomfort and burning for the last 4 days.  When she did have episodes it was responsive to Rolaids and Tums.  She noticed most of the episodes happened while at work.  She denies any increased intake of spicy or fatty foods.  -Will initiate omeprazole 40 mg once daily    Orders:  -     omeprazole (priLOSEC) 40 MG capsule; Take 1 capsule by mouth Daily.  Dispense: 90 capsule; Refill: 1    7. Dyslipidemia  Assessment & Plan:  Rechecking lipid panel today    Orders:  -     Lipid Panel      BMI is within normal parameters. No other follow-up for BMI required.    I spent 30 minutes caring for Earline on this date of service. This time includes time spent by me in the following  increased urinary frequency  Musculoskeletal: Denies joint pain or swelling from muscles or joints  Neurologic: Denies tremor, paresthesias, headache, or sensory motor disturbance  Psychiatric: Denies confusion, insomnia, depression  Integumentray: Denies rash, itching or ulcers. Hematologic: Denies easy bruising, bleeding       PHYSICAL EXAMINATION      Vitals: see vitals section  General: Well developed, in no acute distress. HEENT: No jaundice, oral mucosa moist, no oral ulcers  Neck: Supple, no stiffness, no lymphadenopathy, supple  Heart:  irreg irreg, no murmur, gallop or rub, no jugular venous distention  Respiratory: Clear bilaterally x 4, no wheezing or rales  Abdomen:   Soft, non-tender, bowel sounds are active. Extremities:  No edema, normal cap refill, no cyanosis. Musculoskeletal: No clubbing, no deformities  Neuro: A&Ox3, speech clear, gait stable, cooperative, no focal neurologic deficits  Skin: Skin color is normal. No rashes or lesions. Non diaphoretic, moist.  Vascular: 2+ pulses symmetric in all extremities       DIAGNOSTIC DATA      EKG: AF with RVR       LABORATORY DATA      Lab Results   Component Value Date/Time    WBC 7.8 01/07/2021 07:52 AM    HGB 15.0 01/07/2021 07:52 AM    HCT 46.8 01/07/2021 07:52 AM    PLATELET 112 65/76/7717 07:52 AM    MCV 82.0 01/07/2021 07:52 AM      Lab Results   Component Value Date/Time    Sodium 139 01/07/2021 07:52 AM    Potassium 4.0 01/07/2021 07:52 AM    Chloride 108 01/07/2021 07:52 AM    CO2 27 01/07/2021 07:52 AM    Anion gap 4 (L) 01/07/2021 07:52 AM    Glucose 121 (H) 01/07/2021 07:52 AM    BUN 11 01/07/2021 07:52 AM    Creatinine 1.16 01/07/2021 07:52 AM    BUN/Creatinine ratio 9 (L) 01/07/2021 07:52 AM    GFR est AA >60 01/07/2021 07:52 AM    GFR est non-AA >60 01/07/2021 07:52 AM    Calcium 9.4 01/07/2021 07:52 AM    Bilirubin, total 1.1 (H) 01/07/2021 07:52 AM    Alk.  phosphatase 94 01/07/2021 07:52 AM    Protein, total 7.5 01/07/2021 07:52 AM Albumin 4.0 01/07/2021 07:52 AM    Globulin 3.5 01/07/2021 07:52 AM    A-G Ratio 1.1 01/07/2021 07:52 AM    ALT (SGPT) 49 01/07/2021 07:52 AM           ASSESSMENT      1. Atrial flutter              A. CTI ablation x 2             B. Right lateral atypical atrial flutter s/p ablation x2  2. Atrial fibrillation  3. PVCs  4. Right atrial scar         PLAN     Plan for echocardiogram, lexiscan nuclear stress test (torn hamstring) to evaluate  of chest pain/HERRERA/fatigue with exertion. Restart diltiazem 120 mg daily and eliquis. Plan for FREDRICK/[facility]. Long-term he is leaning towards AF ablation to avoid long-term drug therapy (will discuss in detail during FU from [facility]). ICD-10-CM ICD-9-CM    1. Atrial fibrillation, unspecified type ([address]  I48.91 427.31    2. Essential hypertension  I10 401.9      No orders of the defined types were placed in this encounter. FOLLOW-UP       Thank you, Donna Christie MD for allowing me to participate in the care of this extraordinarily pleasant male. Please do not hesitate to contact me for further questions/concerns.          Giuseppe Bowles MD  Cardiac Electrophysiology / Cardiology    [address]  [address] [facility], [facility], [address] [place] [address], [address] [place] [address] Drive    [facility]  [phone] / [phone] Fax   [phone] / [phone] Fax activities:preparing for the visit, reviewing tests, performing a medically appropriate examination and/or evaluation , counseling and educating the patient/family/caregiver, ordering medications, tests, or procedures, and documenting information in the medical record        Follow Up  Return in about 3 months (around 12/21/2023) for Next scheduled follow up.    Nimo Hinds, APRN

## 2023-09-21 NOTE — ASSESSMENT & PLAN NOTE
Patient underwent cardiac cath in April 2015 with findings of significant coronary artery disease involving the second diagonal branch.  Unfortunately vessel was too small for intervention so she was medically managed with Imdur 30 and 81 mg aspirin for quite some long time.  Now only continues to take aspirin daily.  Followed by Dr. Kenyon

## 2023-09-22 ENCOUNTER — TELEPHONE (OUTPATIENT)
Dept: FAMILY MEDICINE CLINIC | Facility: CLINIC | Age: 78
End: 2023-09-22
Payer: MEDICARE

## 2023-09-22 LAB
ALBUMIN SERPL-MCNC: 4.8 G/DL (ref 3.8–4.8)
ALBUMIN/GLOB SERPL: 2.7 {RATIO} (ref 1.2–2.2)
ALP SERPL-CCNC: 50 IU/L (ref 44–121)
ALT SERPL-CCNC: 11 IU/L (ref 0–32)
AMPHETAMINES UR QL SCN: NEGATIVE NG/ML
AST SERPL-CCNC: 22 IU/L (ref 0–40)
BARBITURATES UR QL SCN: NEGATIVE NG/ML
BASOPHILS # BLD AUTO: 0 X10E3/UL (ref 0–0.2)
BASOPHILS NFR BLD AUTO: 1 %
BENZODIAZ UR QL SCN: POSITIVE NG/ML
BILIRUB SERPL-MCNC: 0.4 MG/DL (ref 0–1.2)
BUN SERPL-MCNC: 16 MG/DL (ref 8–27)
BUN/CREAT SERPL: 25 (ref 12–28)
BZE UR QL SCN: NEGATIVE NG/ML
CALCIUM SERPL-MCNC: 9.8 MG/DL (ref 8.7–10.3)
CANNABINOIDS UR QL SCN: NEGATIVE NG/ML
CHLORIDE SERPL-SCNC: 102 MMOL/L (ref 96–106)
CHOLEST SERPL-MCNC: 143 MG/DL (ref 100–199)
CO2 SERPL-SCNC: 25 MMOL/L (ref 20–29)
CREAT SERPL-MCNC: 0.63 MG/DL (ref 0.57–1)
CREAT UR-MCNC: 206.3 MG/DL (ref 20–300)
EGFRCR SERPLBLD CKD-EPI 2021: 91 ML/MIN/1.73
EOSINOPHIL # BLD AUTO: 0.2 X10E3/UL (ref 0–0.4)
EOSINOPHIL NFR BLD AUTO: 2 %
ERYTHROCYTE [DISTWIDTH] IN BLOOD BY AUTOMATED COUNT: 12.6 % (ref 11.7–15.4)
GLOBULIN SER CALC-MCNC: 1.8 G/DL (ref 1.5–4.5)
GLUCOSE SERPL-MCNC: 97 MG/DL (ref 70–99)
HBA1C MFR BLD: 6 % (ref 4.8–5.6)
HCT VFR BLD AUTO: 39.3 % (ref 34–46.6)
HDLC SERPL-MCNC: 65 MG/DL
HGB BLD-MCNC: 13.2 G/DL (ref 11.1–15.9)
IMM GRANULOCYTES # BLD AUTO: 0 X10E3/UL (ref 0–0.1)
IMM GRANULOCYTES NFR BLD AUTO: 0 %
LABORATORY COMMENT REPORT: ABNORMAL
LDLC SERPL CALC-MCNC: 60 MG/DL (ref 0–99)
LYMPHOCYTES # BLD AUTO: 3.8 X10E3/UL (ref 0.7–3.1)
LYMPHOCYTES NFR BLD AUTO: 45 %
MCH RBC QN AUTO: 32.2 PG (ref 26.6–33)
MCHC RBC AUTO-ENTMCNC: 33.6 G/DL (ref 31.5–35.7)
MCV RBC AUTO: 96 FL (ref 79–97)
METHADONE UR QL SCN: NEGATIVE NG/ML
MICROALBUMIN UR-MCNC: 57.7 UG/ML
MONOCYTES # BLD AUTO: 0.5 X10E3/UL (ref 0.1–0.9)
MONOCYTES NFR BLD AUTO: 6 %
NEUTROPHILS # BLD AUTO: 4 X10E3/UL (ref 1.4–7)
NEUTROPHILS NFR BLD AUTO: 46 %
OPIATES UR QL SCN: NEGATIVE NG/ML
OXYCODONE+OXYMORPHONE UR QL SCN: NEGATIVE NG/ML
PCP UR QL: NEGATIVE NG/ML
PH UR: 5.7 [PH] (ref 4.5–8.9)
PLATELET # BLD AUTO: 310 X10E3/UL (ref 150–450)
POTASSIUM SERPL-SCNC: 4 MMOL/L (ref 3.5–5.2)
PROPOXYPH UR QL SCN: NEGATIVE NG/ML
PROT SERPL-MCNC: 6.6 G/DL (ref 6–8.5)
RBC # BLD AUTO: 4.1 X10E6/UL (ref 3.77–5.28)
SODIUM SERPL-SCNC: 143 MMOL/L (ref 134–144)
TRIGL SERPL-MCNC: 98 MG/DL (ref 0–149)
TSH SERPL DL<=0.005 MIU/L-ACNC: 1.29 UIU/ML (ref 0.45–4.5)
VLDLC SERPL CALC-MCNC: 18 MG/DL (ref 5–40)
WBC # BLD AUTO: 8.4 X10E3/UL (ref 3.4–10.8)

## 2023-09-22 NOTE — TELEPHONE ENCOUNTER
----- Message from NESHA Llanos sent at 9/22/2023 12:22 PM EDT -----  Please let Earline know her labs look great

## 2023-09-26 DIAGNOSIS — F32.A ANXIETY AND DEPRESSION: ICD-10-CM

## 2023-09-26 DIAGNOSIS — F41.9 ANXIETY AND DEPRESSION: ICD-10-CM

## 2023-09-26 RX ORDER — BUPROPION HYDROCHLORIDE 150 MG/1
150 TABLET ORAL DAILY
Qty: 90 TABLET | Refills: 0 | OUTPATIENT
Start: 2023-09-26

## 2023-10-26 ENCOUNTER — TELEPHONE (OUTPATIENT)
Dept: FAMILY MEDICINE CLINIC | Facility: CLINIC | Age: 78
End: 2023-10-26
Payer: MEDICARE

## 2023-10-26 NOTE — TELEPHONE ENCOUNTER
"Caller: Earline Santacruz \"Earline yoon\"    Relationship: Self    Best call back number: [phone]     What form or medical record are you requesting: LETTER - WORK EXCUSE     Who is requesting this form or medical record from you: PATIENT FOR HER EMPLOYER     How would you like to receive the form or medical records (pick-up, mail, fax):       BY HER GRANDDAUGHTER J LUIS WOODS    Timeframe paperwork needed: ASAP    Additional notes: PATIENT STATED SHE HAS A BAD COLD; RUNNY NOSE, SNEEZING CONGESTION. SHE MISSED WORK TODAY DUE TO HER SYMPTOMS AND IS REQUESTING A NOTE TO TAKE TO HER EMPLOYER AS SHE HAS NOT BEEN ABLE TO GET OUT OF BED ALL DAY. PLEASE ADVISE      "

## 2023-10-27 ENCOUNTER — OFFICE VISIT (OUTPATIENT)
Dept: FAMILY MEDICINE CLINIC | Facility: CLINIC | Age: 78
End: 2023-10-27
Payer: MEDICARE

## 2023-10-27 VITALS
WEIGHT: 119 LBS | SYSTOLIC BLOOD PRESSURE: 110 MMHG | TEMPERATURE: 97.4 F | OXYGEN SATURATION: 98 % | DIASTOLIC BLOOD PRESSURE: 70 MMHG | HEIGHT: 62 IN | HEART RATE: 98 BPM | BODY MASS INDEX: 21.9 KG/M2 | RESPIRATION RATE: 16 BRPM

## 2023-10-27 DIAGNOSIS — F41.9 ANXIETY AND DEPRESSION: ICD-10-CM

## 2023-10-27 DIAGNOSIS — F32.A ANXIETY AND DEPRESSION: ICD-10-CM

## 2023-10-27 DIAGNOSIS — R05.9 COUGH, UNSPECIFIED TYPE: Primary | ICD-10-CM

## 2023-10-27 DIAGNOSIS — G47.33 OBSTRUCTIVE SLEEP APNEA: ICD-10-CM

## 2023-10-27 DIAGNOSIS — I10 PRIMARY HYPERTENSION: ICD-10-CM

## 2023-10-27 DIAGNOSIS — B34.9 VIRAL SYNDROME: ICD-10-CM

## 2023-10-27 LAB
EXPIRATION DATE: NORMAL
FLUAV AG UPPER RESP QL IA.RAPID: NOT DETECTED
FLUBV AG UPPER RESP QL IA.RAPID: NOT DETECTED
INTERNAL CONTROL: NORMAL
Lab: NORMAL
SARS-COV-2 AG UPPER RESP QL IA.RAPID: NOT DETECTED

## 2023-10-27 PROCEDURE — 3074F SYST BP LT 130 MM HG: CPT | Performed by: NURSE PRACTITIONER

## 2023-10-27 PROCEDURE — 99214 OFFICE O/P EST MOD 30 MIN: CPT | Performed by: NURSE PRACTITIONER

## 2023-10-27 PROCEDURE — 3078F DIAST BP <80 MM HG: CPT | Performed by: NURSE PRACTITIONER

## 2023-10-27 PROCEDURE — 87428 SARSCOV & INF VIR A&B AG IA: CPT | Performed by: NURSE PRACTITIONER

## 2023-10-27 RX ORDER — FLUTICASONE PROPIONATE 50 MCG
2 SPRAY, SUSPENSION (ML) NASAL DAILY
Qty: 11.1 ML | Refills: 0 | Status: SHIPPED | OUTPATIENT
Start: 2023-10-27

## 2023-10-27 RX ORDER — BROMPHENIRAMINE MALEATE, PSEUDOEPHEDRINE HYDROCHLORIDE, AND DEXTROMETHORPHAN HYDROBROMIDE 2; 30; 10 MG/5ML; MG/5ML; MG/5ML
10 SYRUP ORAL 4 TIMES DAILY PRN
Qty: 200 ML | Refills: 0 | Status: SHIPPED | OUTPATIENT
Start: 2023-10-27

## 2023-10-27 NOTE — ASSESSMENT & PLAN NOTE
Patient has had a difficult year with the death of both her  and daughter over the last 9 months.  She was initiated on fluoxetine and Wellbutrin to deal with the initial greiving period.  Today she tells me she has stopped antidepressants and continues to do well without medications in regards to mood stability.  She has gotten a job as a  at [employer] which she feels helps keep her busy.  She can continues to use her alprazolam 0.5 mg twice daily as needed for breakthrough anxiety

## 2023-10-27 NOTE — PROGRESS NOTES
Office Note     Name: Earline Santacruz    : 1945     MRN: [id number]     Chief Complaint  Cough, Headache, and Nasal Congestion    Subjective     History of Present Illness:  Earline Santacruz is a 78 y.o. female who presents today for complaints of cough, headache, diffuse myalgias and nasal congestion that began approximately 2 days ago.  Patient has no documented fever but has experienced chills that have progressively worsened over the last 48 hours.  She notes at onset of symptoms she thought it was allergy related, however her symptoms have all progressively worsened and been unresponsive to allergy medications.  She has decreased appetite, but notes maintaining adequate hydration with good urinary output.  Patient suffers from chronic hypertension which is well controlled in office today, 110/70.  She is also had recent exacerbation of anxiety and depression due to death of ex- and daughter.  She notes mood stability on current medication regimen currently.  She has no further complaints or concerns today  Review of Systems   Constitutional:  Positive for activity change, fatigue and fever.   HENT:  Positive for congestion and ear pain. Negative for ear discharge.    Respiratory:  Positive for cough. Negative for chest tightness, shortness of breath and wheezing.    Gastrointestinal:  Negative for abdominal pain, constipation, diarrhea, nausea and vomiting.   Genitourinary:  Negative for decreased urine volume.   Musculoskeletal:  Positive for myalgias. Negative for arthralgias.   Skin:  Negative for rash.   Neurological:  Positive for weakness and headache. Negative for dizziness and light-headedness.       Objective     Past Medical History:   Diagnosis Date    Diabetes mellitus     Hyperlipidemia     Hypertension     Panic attacks      Past Surgical History:   Procedure Laterality Date    APPENDECTOMY      BREAST LUMPECTOMY      RIGHT     CATARACT EXTRACTION, BILATERAL      HAND  "SURGERY      LEFT     TOTAL ABDOMINAL HYSTERECTOMY       Family History   Problem Relation Age of Onset    No Known Problems Mother     No Known Problems Father        Vital Signs  /70 (BP Location: Left arm, Patient Position: Sitting, Cuff Size: Adult)   Pulse 98   Temp 97.4 °F (36.3 °C) (Temporal)   Resp 16   Ht 156.2 cm (61.5\")   Wt 54 kg (119 lb)   SpO2 98%   BMI 22.12 kg/m²   Estimated body mass index is 22.12 kg/m² as calculated from the following:    Height as of this encounter: 156.2 cm (61.5\").    Weight as of this encounter: 54 kg (119 lb).    Physical Exam  Vitals reviewed.   HENT:      Head: Normocephalic and atraumatic.      Right Ear: Ear canal and external ear normal.      Left Ear: Ear canal and external ear normal.      Ears:      Comments: Cloudy tympanic membranes bilaterally, no erythema or bulging.     Nose: Congestion and rhinorrhea present.      Mouth/Throat:      Pharynx: Oropharynx is clear. Posterior oropharyngeal erythema present.   Eyes:      Conjunctiva/sclera: Conjunctivae normal.   Cardiovascular:      Rate and Rhythm: Normal rate and regular rhythm.      Pulses: Normal pulses.      Heart sounds: Normal heart sounds.   Pulmonary:      Effort: Pulmonary effort is normal.      Breath sounds: Normal breath sounds.   Abdominal:      General: Bowel sounds are normal.      Palpations: Abdomen is soft.   Skin:     General: Skin is warm and dry.   Neurological:      Mental Status: She is alert and oriented to person, place, and time.             POCT Results (if applicable):  Results for orders placed or performed in visit on 10/27/23   Covid-19 + Flu A&B AG, Veritor    Specimen: Swab   Result Value Ref Range    SARS Antigen Not Detected Not Detected, Presumptive Negative    Influenza A Antigen BENOIT Not Detected Not Detected    Influenza B Antigen BENOIT Not Detected Not Detected    Internal Control Passed Passed    Lot Number [id number]     Expiration Date 10,272,024         "     Assessment and Plan     Diagnoses and all orders for this visit:    1. Cough, unspecified type (Primary)  -     Covid-19 + Flu A&B AG, Veritor    2. Viral syndrome  Assessment & Plan:  presents today for complaints of cough, headache, diffuse myalgias and nasal congestion that began approximately 2 days ago.  Patient has no documented fever but has experienced chills that have progressively worsened over the last 48 hours.  She notes at onset of symptoms she thought it was allergy related, however her symptoms have all progressively worsened and been unresponsive to allergy medications.  She has decreased appetite, but notes maintaining adequate hydration with good urinary output.  She has tested negative for flu, COVID and strep in office today.  -Discussed symptom management with nasal steroid for sinus inflammation.  We will also give Bromfed to assist with congestion and cough.  Patient advised continued use of ibuprofen or Tylenol for headache and diffuse myalgias.  Encourage plenty of rest and fluids.  She has been given work excuse for yesterday and today.  If no improvement she is to return for further evaluation    Orders:  -     brompheniramine-pseudoephedrine-DM 30-2-10 MG/5ML syrup; Take 10 mL by mouth 4 (Four) Times a Day As Needed for Congestion or Cough.  Dispense: 200 mL; Refill: 0  -     fluticasone (FLONASE) 50 MCG/ACT nasal spray; 2 sprays into the nostril(s) as directed by provider Daily.  Dispense: 11.1 mL; Refill: 0    3. Obstructive sleep apnea  Assessment & Plan:  Patient does not utilize CPAP      4. Primary hypertension  Assessment & Plan:  Blood pressure exhibiting excellent control on current medications, 110/70 in office today.  -Continue losartan 12.5mg daily.      5. Anxiety and depression  Assessment & Plan:  Patient has had a difficult year with the death of both her  and daughter over the last 9 months.  She was initiated on fluoxetine and Wellbutrin to deal with the  initial greiving period.  Today she tells me she has stopped antidepressants and continues to do well without medications in regards to mood stability.  She has gotten a job as a  at [employer] which she feels helps keep her busy.  She can continues to use her alprazolam 0.5 mg twice daily as needed for breakthrough anxiety        BMI is within normal parameters. No other follow-up for BMI required.        Follow Up  No follow-ups on file.    Nimo Hinds, APRN

## 2023-10-27 NOTE — ASSESSMENT & PLAN NOTE
presents today for complaints of cough, headache, diffuse myalgias and nasal congestion that began approximately 2 days ago.  Patient has no documented fever but has experienced chills that have progressively worsened over the last 48 hours.  She notes at onset of symptoms she thought it was allergy related, however her symptoms have all progressively worsened and been unresponsive to allergy medications.  She has decreased appetite, but notes maintaining adequate hydration with good urinary output.  She has tested negative for flu, COVID and strep in office today.  -Discussed symptom management with nasal steroid for sinus inflammation.  We will also give Bromfed to assist with congestion and cough.  Patient advised continued use of ibuprofen or Tylenol for headache and diffuse myalgias.  Encourage plenty of rest and fluids.  She has been given work excuse for yesterday and today.  If no improvement she is to return for further evaluation

## 2023-11-06 ENCOUNTER — TELEMEDICINE (OUTPATIENT)
Dept: FAMILY MEDICINE CLINIC | Facility: CLINIC | Age: 78
End: 2023-11-06
Payer: MEDICARE

## 2023-11-06 DIAGNOSIS — J40 BRONCHITIS: ICD-10-CM

## 2023-11-06 PROCEDURE — 99213 OFFICE O/P EST LOW 20 MIN: CPT | Performed by: NURSE PRACTITIONER

## 2023-11-06 RX ORDER — PREDNISONE 10 MG/1
30 TABLET ORAL DAILY
Qty: 15 TABLET | Refills: 0 | Status: SHIPPED | OUTPATIENT
Start: 2023-11-06

## 2023-11-06 RX ORDER — CEFDINIR 300 MG/1
300 CAPSULE ORAL 2 TIMES DAILY
Qty: 20 CAPSULE | Refills: 0 | Status: SHIPPED | OUTPATIENT
Start: 2023-11-06

## 2023-11-06 NOTE — PROGRESS NOTES
Telehealth Visit     Date: 2023   Patient Name: Earline Santacruz  : 1945   MRN: [id number]     Chief Complaint:  No chief complaint on file.      This provider is located at the [facility] in [place], [place]. The patient is being seen remotely via telehealth at their home address in [place], and stated they are in a secure environment for this session. The patient's condition being diagnosed/treated is appropriate for telemedicine. The provider identified herself as well as her credentials. The patient, and/or patients guardian, consent to be seen remotely, and when consent is given they understand that the consent allows for patient identifiable information to be sent to a third party as needed. They may refuse to be seen remotely at any time. The electronic data is encrypted and password protected, and the patient and/or guardian has been advised of the potential risks to privacy not withstanding such measures.    You have chosen to receive care through a telehealth visit. Do you consent to use a video/audio connection for your medical care today? Yes    History of Present Illness: Earline Santacruz is a 78 y.o. female who is here today to follow up with ongoing complaints of significant cough and sinus congestion.  Patient was initially initially evaluated in the office on 10/27 with complaints of cough, headache, diffuse myalgias and nasal congestion that had began 2 days prior to presentation.  She noted that onset of symptoms she thought was allergy related, however her symptoms had all progressively worsened and been unresponsive to allergy medications.  She tested negative for COVID, flu and strep at that time.  She was diagnosed with another viral illness, subsequently treated with Flonase and Bromfed for symptom management.  Today she reports for reevaluation with ongoing symptoms despite previous interventions.  She notes her sinus pressure has actually worsened,  cough is now productive of thick green sputum.  She has no further complaints or concerns today      Subjective      Review of Systems   Constitutional:  Positive for appetite change and fatigue. Negative for activity change.   HENT:  Positive for congestion, ear pain, postnasal drip, rhinorrhea, sinus pressure, sinus pain and sore throat. Negative for ear discharge.    Respiratory:  Positive for cough and chest tightness. Negative for shortness of breath and wheezing.    Cardiovascular:  Negative for chest pain, palpitations and leg swelling.   Gastrointestinal:  Negative for abdominal pain, diarrhea, nausea and vomiting.   Endocrine: Negative for polydipsia and polyuria.   Musculoskeletal:  Negative for arthralgias and myalgias.   Skin:  Negative for rash.   Neurological:  Positive for weakness and headaches. Negative for dizziness, light-headedness and numbness.       I have reviewed and the following portions of the patient's history were updated as appropriate: past family history, past medical history, past social history, past surgical history and problem list.    Past Medical History:   Diagnosis Date    Diabetes mellitus     Hyperlipidemia     Hypertension     Panic attacks        Past Surgical History:   Procedure Laterality Date    APPENDECTOMY      BREAST LUMPECTOMY      RIGHT     CATARACT EXTRACTION, BILATERAL      HAND SURGERY      LEFT     TOTAL ABDOMINAL HYSTERECTOMY         Social History     Socioeconomic History    Marital status:    Tobacco Use    Smoking status: Never    Smokeless tobacco: Never   Vaping Use    Vaping Use: Never used   Substance and Sexual Activity    Alcohol use: No    Drug use: No    Sexual activity: Defer         Current Outpatient Medications:     ALPRAZolam (Xanax) 1 MG tablet, Take 1 tablet by mouth 2 (Two) Times a Day As Needed for Anxiety., Disp: 60 tablet, Rfl: 0    aspirin 81 MG EC tablet, Take 1 tablet by mouth Daily., Disp: 30 tablet, Rfl: 2     brompheniramine-pseudoephedrine-DM 30-2-10 MG/5ML syrup, Take 10 mL by mouth 4 (Four) Times a Day As Needed for Congestion or Cough., Disp: 200 mL, Rfl: 0    cefdinir (OMNICEF) 300 MG capsule, Take 1 capsule by mouth 2 (Two) Times a Day., Disp: 20 capsule, Rfl: 0    coenzyme Q10 100 MG capsule, Take 1 capsule by mouth Daily., Disp: 30 capsule, Rfl: 2    fluticasone (FLONASE) 50 MCG/ACT nasal spray, 2 sprays into the nostril(s) as directed by provider Daily., Disp: 11.1 mL, Rfl: 0    gabapentin (NEURONTIN) 300 MG capsule, Take 1 capsule by mouth 3 (Three) Times a Day., Disp: 90 capsule, Rfl: 1    levothyroxine (SYNTHROID, LEVOTHROID) 50 MCG tablet, Take 1 tablet by mouth Daily., Disp: 90 tablet, Rfl: 1    losartan (COZAAR) 25 MG tablet, Take 0.5 tablets by mouth Daily., Disp: 45 tablet, Rfl: 2    metFORMIN (GLUCOPHAGE) 500 MG tablet, Take 4 tablets by mouth Daily., Disp: , Rfl:     methenamine (HIPREX) 1 g tablet, Take 1 tablet by mouth 2 (Two) Times a Day., Disp: , Rfl:     omeprazole (priLOSEC) 40 MG capsule, Take 1 capsule by mouth Daily., Disp: 90 capsule, Rfl: 1    predniSONE (DELTASONE) 10 MG tablet, Take 3 tablets by mouth Daily., Disp: 15 tablet, Rfl: 0    rosuvastatin (CRESTOR) 20 MG tablet, Take 1 tablet by mouth Daily. Need lab work, Disp: 90 tablet, Rfl: 2    Objective     Physical Exam:  Vital Signs: There were no vitals filed for this visit.  There is no height or weight on file to calculate BMI.    Physical Exam  Vitals reviewed.   Pulmonary:      Effort: Pulmonary effort is normal. No respiratory distress.   Neurological:      Mental Status: She is alert and oriented to person, place, and time.         Assessment / Plan      Diagnoses and all orders for this visit:    1. Bronchitis  Assessment & Plan:  is here today to follow up with ongoing complaints of significant cough and sinus congestion.  Patient was initially initially evaluated in the office on 10/27 with complaints of cough, headache,  diffuse myalgias and nasal congestion that had began 2 days prior to presentation.  She noted that onset of symptoms she thought was allergy related, however her symptoms had all progressively worsened and been unresponsive to allergy medications.  She tested negative for COVID, flu and strep at that time.  She was diagnosed with another viral illness, subsequently treated with Flonase and Bromfed for symptom management.  Today she reports for reevaluation with ongoing symptoms despite previous interventions.  She notes her sinus pressure has actually worsened, cough is now productive of thick green sputum.  Patient denies any shortness of breath, wheezing or difficulty breathing.  -We will treat with 5-day course of 30 mg daily prednisone for bronchial inflammation and hoarseness  -We will treat for likely secondary sinus infection with 10-day course of cefdinir as directed  -Advised ongoing use of daily Flonase.  She also has Bromfed to continue for cough and congestion.    Orders:  -     cefdinir (OMNICEF) 300 MG capsule; Take 1 capsule by mouth 2 (Two) Times a Day.  Dispense: 20 capsule; Refill: 0  -     predniSONE (DELTASONE) 10 MG tablet; Take 3 tablets by mouth Daily.  Dispense: 15 tablet; Refill: 0         Follow Up:   No follow-ups on file.    Any medications prescribed have been sent electronically to   [facility] - [address] [phone] [facility] [phone] [address] [address]  Phone: [phone] Fax: [phone]      30 minutes were spent reviewing the patient's questionnaire, formulating a treatment plan, and relaying information to the patient via [facility].    NESHA Llanos    Part of this note may be an electronic transcription/translation of spoken language to printed text using the Dragon Dictation System.

## 2023-11-06 NOTE — ASSESSMENT & PLAN NOTE
is here today to follow up with ongoing complaints of significant cough and sinus congestion.  Patient was initially initially evaluated in the office on 10/27 with complaints of cough, headache, diffuse myalgias and nasal congestion that had began 2 days prior to presentation.  She noted that onset of symptoms she thought was allergy related, however her symptoms had all progressively worsened and been unresponsive to allergy medications.  She tested negative for COVID, flu and strep at that time.  She was diagnosed with another viral illness, subsequently treated with Flonase and Bromfed for symptom management.  Today she reports for reevaluation with ongoing symptoms despite previous interventions.  She notes her sinus pressure has actually worsened, cough is now productive of thick green sputum.  Patient denies any shortness of breath, wheezing or difficulty breathing.  -We will treat with 5-day course of 30 mg daily prednisone for bronchial inflammation and hoarseness  -We will treat for likely secondary sinus infection with 10-day course of cefdinir as directed  -Advised ongoing use of daily Flonase.  She also has Bromfed to continue for cough and congestion.

## 2023-12-06 ENCOUNTER — OFFICE VISIT (OUTPATIENT)
Dept: FAMILY MEDICINE CLINIC | Facility: CLINIC | Age: 78
End: 2023-12-06
Payer: MEDICARE

## 2023-12-06 VITALS
TEMPERATURE: 97.2 F | HEART RATE: 71 BPM | RESPIRATION RATE: 18 BRPM | BODY MASS INDEX: 22.62 KG/M2 | SYSTOLIC BLOOD PRESSURE: 126 MMHG | WEIGHT: 119.8 LBS | DIASTOLIC BLOOD PRESSURE: 52 MMHG | HEIGHT: 61 IN | OXYGEN SATURATION: 95 %

## 2023-12-06 DIAGNOSIS — I25.10 CORONARY ARTERY DISEASE INVOLVING NATIVE CORONARY ARTERY OF NATIVE HEART WITHOUT ANGINA PECTORIS: ICD-10-CM

## 2023-12-06 DIAGNOSIS — E11.42 TYPE 2 DIABETES MELLITUS WITH DIABETIC POLYNEUROPATHY, WITHOUT LONG-TERM CURRENT USE OF INSULIN: Primary | ICD-10-CM

## 2023-12-06 DIAGNOSIS — F41.9 ANXIETY AND DEPRESSION: ICD-10-CM

## 2023-12-06 DIAGNOSIS — F32.A ANXIETY AND DEPRESSION: ICD-10-CM

## 2023-12-06 DIAGNOSIS — I10 PRIMARY HYPERTENSION: ICD-10-CM

## 2023-12-06 PROCEDURE — 1160F RVW MEDS BY RX/DR IN RCRD: CPT | Performed by: NURSE PRACTITIONER

## 2023-12-06 PROCEDURE — 3074F SYST BP LT 130 MM HG: CPT | Performed by: NURSE PRACTITIONER

## 2023-12-06 PROCEDURE — 1159F MED LIST DOCD IN RCRD: CPT | Performed by: NURSE PRACTITIONER

## 2023-12-06 PROCEDURE — 99214 OFFICE O/P EST MOD 30 MIN: CPT | Performed by: NURSE PRACTITIONER

## 2023-12-06 PROCEDURE — 3078F DIAST BP <80 MM HG: CPT | Performed by: NURSE PRACTITIONER

## 2023-12-06 RX ORDER — ALPRAZOLAM 0.5 MG/1
0.5 TABLET ORAL 2 TIMES DAILY PRN
Qty: 60 TABLET | Refills: 0 | Status: SHIPPED | OUTPATIENT
Start: 2023-12-06

## 2023-12-06 NOTE — ASSESSMENT & PLAN NOTE
Patient has been doing well overall, the beginning of the year was a rough start for her losing both her daughter and her  in a matter of months.  Since that time she has gotten a job at [employer], her 6-month work anniversary is the 13th of this month.  She states that has been very good for her mental health, not really struggling with anxiety or depression currently.  She has discontinued her antidepressant therapy.  She has a longstanding pattern of anxiety with intermittent panic for which she has utilized alprazolam 0.5 mg twice daily as needed.  During the grieving process she required increase of alprazolam to 1 mg twice daily.  Today she is requesting we decrease her back to her original 0.5 mg twice daily dosing.  -Decrease alprazolam to 0.5mg BID as needed for anxiety

## 2023-12-06 NOTE — ASSESSMENT & PLAN NOTE
Patient has not been checking glucose regularly at home. A1C always well controlled, generally 6.0% range, last checked two months ago. Patient tell me today she has been having some issues with diarrhea and bowel leakage for approximately the last six months. She thinks it may be due to her metformin. No nausea, vomiting, dyspepsia or abdominal pain.   -Requested she resume daily glucose checks, AM fasting.   -Decrease metformin to one tablet twice daily with planned follow up in three months. Patient to call sooner if home glucose readings become elevated

## 2023-12-06 NOTE — ASSESSMENT & PLAN NOTE
.Blood pressure well-controlled, patient currently utilizing 12.5mg losartan daily. Due to change in  she is having difficulty splitting her 25mg tablet. She tried to take a whole tablet one day but became light headed and when she checked her BP it was low.  -Hold losartan for now  -Patient instructed to begin home BP log for review, checking 3-4 times weekly. If BP staying greater than 140/80 she needs to resume her 12.5mg daily losartan

## 2023-12-06 NOTE — PROGRESS NOTES
Office Note     Name: Earline Santacruz    : 1945     MRN: [id number]     Chief Complaint  Medication Problem, Diarrhea, and Med Refill    Subjective     History of Present Illness:  Earline Santacruz is a 78 y.o. female who presents today for follow-up on chronic conditions including type 2 diabetes, anxiety/depression and hypertension.  Patient has been doing well overall, the beginning of the year was a rough start for her losing both her daughter and her  in a matter of months.  Since that time she has gotten a job at [employer], her 6-month work anniversary is the 13 of this month.  She states that has been very good for her mental health, not really struggling with anxiety or depression currently.  She has discontinued her antidepressant therapy.  She has a longstanding pattern of anxiety with intermittent panic for which she has utilized alprazolam 0.5 mg twice daily as needed.  During the grieving process she required increase of alprazolam to 1 mg twice daily.  Today she is requesting we decrease her back to her original 0.5 mg twice daily dosing.  Blood pressure well-controlled, patient currently utilizing 12.5mg losartan daily. Due to change in  she is having difficulty splitting her 25mg tablet. She tried to take a whole tablet one day but became light headed and when she checked her BP it was low. Patient also had complaints of GI issues, particularly bowel leakage,  that began approximately six months ago. She feels this is due to her metformin and would like to stop the medication. She has no further complaints or concerns today.   Review of Systems   Constitutional:  Negative for appetite change and fatigue.   Eyes:  Negative for visual disturbance.   Respiratory:  Negative for cough, chest tightness, shortness of breath and wheezing.    Cardiovascular:  Negative for chest pain, palpitations and leg swelling.   Gastrointestinal:  Positive for diarrhea. Negative for  "abdominal pain, constipation, nausea and vomiting.   Endocrine: Negative for polydipsia and polyuria.   Genitourinary:  Negative for difficulty urinating, frequency and hematuria.   Musculoskeletal:  Negative for arthralgias and myalgias.   Neurological:  Negative for dizziness, weakness, light-headedness, numbness and headache.   Psychiatric/Behavioral:  Negative for depressed mood. The patient is not nervous/anxious.        Objective     Past Medical History:   Diagnosis Date    Diabetes mellitus     Hyperlipidemia     Hypertension     Panic attacks      Past Surgical History:   Procedure Laterality Date    APPENDECTOMY      BREAST LUMPECTOMY      RIGHT     CATARACT EXTRACTION, BILATERAL      HAND SURGERY      LEFT     TOTAL ABDOMINAL HYSTERECTOMY       Family History   Problem Relation Age of Onset    No Known Problems Mother     No Known Problems Father        Vital Signs  /52 (BP Location: Left arm, Patient Position: Sitting, Cuff Size: Small Adult)   Pulse 71   Temp 97.2 °F (36.2 °C) (Temporal)   Resp 18   Ht 156.2 cm (61.5\")   Wt 54.3 kg (119 lb 12.8 oz)   SpO2 95%   BMI 22.27 kg/m²   Estimated body mass index is 22.27 kg/m² as calculated from the following:    Height as of this encounter: 156.2 cm (61.5\").    Weight as of this encounter: 54.3 kg (119 lb 12.8 oz).    Physical Exam  Vitals reviewed.   Constitutional:       Appearance: Normal appearance.   HENT:      Right Ear: Tympanic membrane, ear canal and external ear normal.      Left Ear: Tympanic membrane, ear canal and external ear normal.      Mouth/Throat:      Mouth: Mucous membranes are moist.      Pharynx: Oropharynx is clear.   Eyes:      Conjunctiva/sclera: Conjunctivae normal.   Cardiovascular:      Rate and Rhythm: Normal rate and regular rhythm.      Pulses: Normal pulses.      Heart sounds: Normal heart sounds.   Pulmonary:      Effort: Pulmonary effort is normal.      Breath sounds: Normal breath sounds.   Abdominal:      " General: Bowel sounds are normal. There is no distension.      Palpations: Abdomen is soft.      Tenderness: There is no abdominal tenderness.   Musculoskeletal:         General: Normal range of motion.   Skin:     General: Skin is warm and dry.   Neurological:      Mental Status: She is alert and oriented to person, place, and time.   Psychiatric:         Mood and Affect: Mood normal.         Behavior: Behavior normal.         Thought Content: Thought content normal.         Judgment: Judgment normal.               POCT Results (if applicable):  Results for orders placed or performed in visit on 10/27/23   Covid-19 + Flu A&B AG, Veritor    Specimen: Swab   Result Value Ref Range    SARS Antigen Not Detected Not Detected, Presumptive Negative    Influenza A Antigen BENOIT Not Detected Not Detected    Influenza B Antigen BENOIT Not Detected Not Detected    Internal Control Passed Passed    Lot Number [id number]     Expiration Date 10,272,024             Assessment and Plan     Diagnoses and all orders for this visit:    1. Type 2 diabetes mellitus with diabetic polyneuropathy, without long-term current use of insulin (Primary)  Assessment & Plan:  Patient has not been checking glucose regularly at home. A1C always well controlled, generally 6.0% range, last checked two months ago. Patient tell me today she has been having some issues with diarrhea and bowel leakage for approximately the last six months. She thinks it may be due to her metformin. No nausea, vomiting, dyspepsia or abdominal pain.   -Requested she resume daily glucose checks, AM fasting.   -Decrease metformin to one tablet twice daily with planned follow up in three months. Patient to call sooner if home glucose readings become elevated      2. Anxiety and depression  Assessment & Plan:  Patient has been doing well overall, the beginning of the year was a rough start for her losing both her daughter and her  in a matter of months.  Since that time she  has gotten a job at [employer], her 6-month work anniversary is the 13th of this month.  She states that has been very good for her mental health, not really struggling with anxiety or depression currently.  She has discontinued her antidepressant therapy.  She has a longstanding pattern of anxiety with intermittent panic for which she has utilized alprazolam 0.5 mg twice daily as needed.  During the grieving process she required increase of alprazolam to 1 mg twice daily.  Today she is requesting we decrease her back to her original 0.5 mg twice daily dosing.  -Decrease alprazolam to 0.5mg BID as needed for anxiety    Orders:  -     ALPRAZolam (XANAX) 0.5 MG tablet; Take 1 tablet by mouth 2 (Two) Times a Day As Needed for Anxiety.  Dispense: 60 tablet; Refill: 0    3. Primary hypertension  Assessment & Plan:  .Blood pressure well-controlled, patient currently utilizing 12.5mg losartan daily. Due to change in  she is having difficulty splitting her 25mg tablet. She tried to take a whole tablet one day but became light headed and when she checked her BP it was low.  -Hold losartan for now  -Patient instructed to begin home BP log for review, checking 3-4 times weekly. If BP staying greater than 140/80 she needs to resume her 12.5mg daily losartan      4. Coronary artery disease involving native coronary artery of native heart without angina pectoris  Assessment & Plan:  Patient underwent cardiac cath in April 2015 with findings of significant coronary artery disease involving the second diagonal branch.  Unfortunately vessel was too small for intervention so she was medically managed with Imdur 30 and 81 mg aspirin for quite some long time.  Now only continues to take aspirin daily.  Followed by Dr. Kenyon       Other orders  -     metFORMIN (GLUCOPHAGE) 500 MG tablet; Take 1 tablet by mouth 2 (Two) Times a Day With Meals.  Dispense: 180 tablet; Refill: 1      BMI is within normal parameters. No other  follow-up for BMI required.          Follow Up  Return in about 3 months (around 3/6/2024) for Next scheduled follow up.    Nimo Hinds, APRN

## 2023-12-20 RX ORDER — LOSARTAN POTASSIUM 25 MG/1
12.5 TABLET ORAL DAILY
Qty: 45 TABLET | Refills: 1 | Status: SHIPPED | OUTPATIENT
Start: 2023-12-20

## 2024-01-25 ENCOUNTER — OFFICE VISIT (OUTPATIENT)
Dept: FAMILY MEDICINE CLINIC | Facility: CLINIC | Age: 79
End: 2024-01-25
Payer: MEDICARE

## 2024-01-25 VITALS
HEIGHT: 62 IN | OXYGEN SATURATION: 97 % | SYSTOLIC BLOOD PRESSURE: 128 MMHG | RESPIRATION RATE: 18 BRPM | HEART RATE: 67 BPM | WEIGHT: 123.2 LBS | BODY MASS INDEX: 22.67 KG/M2 | TEMPERATURE: 97.2 F | DIASTOLIC BLOOD PRESSURE: 84 MMHG

## 2024-01-25 DIAGNOSIS — K21.9 GASTROESOPHAGEAL REFLUX DISEASE WITHOUT ESOPHAGITIS: Primary | ICD-10-CM

## 2024-01-25 DIAGNOSIS — L81.9 PIGMENTED SKIN LESION SUSPICIOUS FOR MALIGNANT NEOPLASM: ICD-10-CM

## 2024-01-25 DIAGNOSIS — E03.9 ACQUIRED HYPOTHYROIDISM: ICD-10-CM

## 2024-01-25 DIAGNOSIS — I10 PRIMARY HYPERTENSION: ICD-10-CM

## 2024-01-25 DIAGNOSIS — I25.10 CORONARY ARTERY DISEASE INVOLVING NATIVE CORONARY ARTERY OF NATIVE HEART WITHOUT ANGINA PECTORIS: ICD-10-CM

## 2024-01-25 DIAGNOSIS — E11.40 TYPE 2 DIABETES MELLITUS WITH DIABETIC NEUROPATHY, WITHOUT LONG-TERM CURRENT USE OF INSULIN: ICD-10-CM

## 2024-01-25 DIAGNOSIS — E11.42 TYPE 2 DIABETES MELLITUS WITH DIABETIC POLYNEUROPATHY, WITHOUT LONG-TERM CURRENT USE OF INSULIN: ICD-10-CM

## 2024-01-25 PROCEDURE — 1159F MED LIST DOCD IN RCRD: CPT | Performed by: NURSE PRACTITIONER

## 2024-01-25 PROCEDURE — 3079F DIAST BP 80-89 MM HG: CPT | Performed by: NURSE PRACTITIONER

## 2024-01-25 PROCEDURE — 1160F RVW MEDS BY RX/DR IN RCRD: CPT | Performed by: NURSE PRACTITIONER

## 2024-01-25 PROCEDURE — 3074F SYST BP LT 130 MM HG: CPT | Performed by: NURSE PRACTITIONER

## 2024-01-25 PROCEDURE — 99214 OFFICE O/P EST MOD 30 MIN: CPT | Performed by: NURSE PRACTITIONER

## 2024-01-25 PROCEDURE — 93000 ELECTROCARDIOGRAM COMPLETE: CPT | Performed by: NURSE PRACTITIONER

## 2024-01-25 RX ORDER — PANTOPRAZOLE SODIUM 40 MG/1
40 TABLET, DELAYED RELEASE ORAL DAILY
Qty: 30 TABLET | Refills: 3 | Status: SHIPPED | OUTPATIENT
Start: 2024-01-25

## 2024-01-25 RX ORDER — GABAPENTIN 300 MG/1
300 CAPSULE ORAL 3 TIMES DAILY
Qty: 90 CAPSULE | Refills: 1 | Status: SHIPPED | OUTPATIENT
Start: 2024-01-25 | End: 2024-01-26 | Stop reason: SDUPTHER

## 2024-01-25 RX ORDER — SUCRALFATE 1 G/1
1 TABLET ORAL 3 TIMES DAILY
Qty: 90 TABLET | Refills: 0 | Status: SHIPPED | OUTPATIENT
Start: 2024-01-25

## 2024-01-25 NOTE — ASSESSMENT & PLAN NOTE
.Blood pressure well-controlled, 128/84 in office today.  During her last visit patient was utilizing 12.5mg losartan daily. Due to change in  she is having difficulty splitting her 25mg tablet. She tried to take a whole tablet one day but became light headed and when she checked her BP it was low.  Her losartan has been held since that time with continued good blood pressure control

## 2024-01-25 NOTE — ASSESSMENT & PLAN NOTE
Patient has not been checking glucose regularly at home. A1C always well controlled, generally 6.0% range, last checked two months ago. Patient tell me today she has been having some issues with diarrhea and bowel leakage for approximately the last six months. She thinks it may be due to her metformin. No nausea, vomiting, dyspepsia or abdominal pain.   -Requested she resume daily glucose checks, AM fasting.   -Decrease metformin to one tablet twice daily with planned follow up in three months. Patient to call sooner if home glucose readings become elevated.

## 2024-01-25 NOTE — ASSESSMENT & PLAN NOTE
Four months ago she noted new onset heartburn, almost daily the week prior to presentation.  By the time she was evaluated she noted frequency has subsided approximately 4 days prior.  She noticed most of the episodes happened while at work and responded well to Rolaids or Tums.  She was initiated on omeprazole 40 mg once daily.  Today she reports that she will have benefits for a short period of time from omeprazole and on some days has repeated the dose later in the day without any benefit.  -Transition from omeprazole to daily pantoprazole 40 mg  -Will trial carafate 3 times daily

## 2024-01-25 NOTE — ASSESSMENT & PLAN NOTE
Patient underwent cardiac cath in April 2015 with findings of significant coronary artery disease involving the second diagonal branch.  Unfortunately vessel was too small for intervention so she was medically managed with Imdur 30 and 81 mg aspirin for quite some long time.  Now only continues to take aspirin daily.  She was previously followed by Dr. Kenyon at [facility], however she has failed to follow-up since 2019.  Patient states the subscapular type pain she is currently experiencing was very similar to what she was experiencing in 2015 that required cardiac catheterization.  EKG in office today is normal.  Patient would like referral to local cardiologist, Dr. Albert..

## 2024-01-25 NOTE — PROGRESS NOTES
Office Note     Name: Earline Santacruz    : 1945     MRN: [id number]     Chief Complaint  Heartburn    Subjective     History of Present Illness:  Earline Santacruz is a 79 y.o. female who presents today with ongoing complaints of sharp thoracic pain.  Patient was last evaluated for the same complaint months ago.  She noted new onset heartburn, almost daily the week prior to presentation.  By the time she was evaluated she noted frequency has subsided approximately 4 days prior.  She noticed most of the episodes happened while at work and responded well to Rolaids or Tums.  She was initiated on omeprazole 40 mg once daily.  Today she reports that she will have benefits for a short period of time from omeprazole and on some days has repeated the dose later in the day without any benefit.  As opposed to burning and epigastric discomfort today she relates more of a subscapular, sharp pain at times but always aching.  She relates this type of pain was present in  at which time she was found to have coronary artery disease on heart catheterization.  He denies her pain is positional in nature, no tenderness on palpation.  She is also followed for type 2 diabetes with neuropathy, hypertension and hypothyroidism.  Patient's bilateral lower extremity neuropathy has been well-controlled without use of gabapentin until she returned to the workforce, now standing for 7 to 8 hours on concrete floors while working at [employer].  Patient states she will work 4 to 5 days and by her day off can barely move.  She has utilized gabapentin in the past with excellent benefit.  She has no further complaints or concerns today  Review of Systems   Constitutional:  Negative for fatigue.   Respiratory:  Negative for cough, chest tightness, shortness of breath and wheezing.    Cardiovascular:  Negative for chest pain, palpitations and leg swelling.   Gastrointestinal:  Positive for GERD. Negative for abdominal pain,  "constipation, diarrhea, nausea and vomiting.   Endocrine: Negative for polydipsia and polyuria.   Musculoskeletal:  Positive for back pain.   Neurological:  Positive for numbness. Negative for dizziness, light-headedness and headache.   Psychiatric/Behavioral:  Negative for sleep disturbance and depressed mood. The patient is not nervous/anxious.        Objective     Past Medical History:   Diagnosis Date    Diabetes mellitus     Hyperlipidemia     Hypertension     Panic attacks      Past Surgical History:   Procedure Laterality Date    APPENDECTOMY      BREAST LUMPECTOMY      RIGHT     CATARACT EXTRACTION, BILATERAL      HAND SURGERY      LEFT     TOTAL ABDOMINAL HYSTERECTOMY       Family History   Problem Relation Age of Onset    No Known Problems Mother     No Known Problems Father        Vital Signs  /84 (BP Location: Left arm, Patient Position: Sitting, Cuff Size: Adult)   Pulse 67   Temp 97.2 °F (36.2 °C) (Temporal)   Resp 18   Ht 156.2 cm (61.5\")   Wt 55.9 kg (123 lb 3.2 oz)   SpO2 97%   BMI 22.90 kg/m²   Estimated body mass index is 22.9 kg/m² as calculated from the following:    Height as of this encounter: 156.2 cm (61.5\").    Weight as of this encounter: 55.9 kg (123 lb 3.2 oz).    Physical Exam  Vitals reviewed.   Constitutional:       Appearance: Normal appearance.   HENT:      Right Ear: Tympanic membrane, ear canal and external ear normal.      Left Ear: Tympanic membrane, ear canal and external ear normal.      Nose: Nose normal.      Mouth/Throat:      Pharynx: Oropharynx is clear.   Eyes:      Conjunctiva/sclera: Conjunctivae normal.   Cardiovascular:      Rate and Rhythm: Normal rate and regular rhythm.      Pulses: Normal pulses.      Heart sounds: Normal heart sounds.   Pulmonary:      Effort: Pulmonary effort is normal.      Breath sounds: Normal breath sounds.   Abdominal:      General: Bowel sounds are normal. There is no distension.      Palpations: Abdomen is soft.      " Tenderness: There is no abdominal tenderness.   Musculoskeletal:         General: No tenderness. Normal range of motion.      Cervical back: Neck supple.      Right lower leg: No edema.      Left lower leg: No edema.   Skin:     General: Skin is warm and dry.      Comments: Scaly red lesion noted on left preauricular area.  No bleeding   Neurological:      Mental Status: She is alert and oriented to person, place, and time.               POCT Results (if applicable):  Results for orders placed or performed in visit on 10/27/23   Covid-19 + Flu A&B AG, Veritor    Specimen: Swab   Result Value Ref Range    SARS Antigen Not Detected Not Detected, Presumptive Negative    Influenza A Antigen BENOIT Not Detected Not Detected    Influenza B Antigen BENOIT Not Detected Not Detected    Internal Control Passed Passed    Lot Number [id number]     Expiration Date 10,272,024          ECG 12 Lead    Date/Time: 1/25/2024 12:01 PM  Performed by: Nimo Hinds APRN    Authorized by: Nimo Hinds APRN  Comparison: compared with previous ECG from 6/30/2023  Similar to previous ECG  Rhythm: sinus rhythm  Rate: normal  Conduction: conduction normal  ST Segments: ST segments normal  T Waves: T waves normal  QRS axis: normal    Clinical impression: normal ECG          Assessment and Plan     Diagnoses and all orders for this visit:    1. Gastroesophageal reflux disease without esophagitis (Primary)  Assessment & Plan:  Four months ago she noted new onset heartburn, almost daily the week prior to presentation.  By the time she was evaluated she noted frequency has subsided approximately 4 days prior.  She noticed most of the episodes happened while at work and responded well to Rolaids or Tums.  She was initiated on omeprazole 40 mg once daily.  Today she reports that she will have benefits for a short period of time from omeprazole and on some days has repeated the dose later in the day without any benefit.  -Transition from omeprazole  to daily pantoprazole 40 mg  -Will trial carafate 3 times daily    Orders:  -     pantoprazole (PROTONIX) 40 MG EC tablet; Take 1 tablet by mouth Daily.  Dispense: 30 tablet; Refill: 3  -     sucralfate (Carafate) 1 g tablet; Take 1 tablet by mouth 3 (Three) Times a Day.  Dispense: 90 tablet; Refill: 0    2. Primary hypertension  Assessment & Plan:  .Blood pressure well-controlled, 128/84 in office today.  During her last visit patient was utilizing 12.5mg losartan daily. Due to change in  she is having difficulty splitting her 25mg tablet. She tried to take a whole tablet one day but became light headed and when she checked her BP it was low.  Her losartan has been held since that time with continued good blood pressure control      3. Acquired hypothyroidism  Assessment & Plan:  Recently well controlled on levothyroxine 50 mcg once daily,       4. Type 2 diabetes mellitus with diabetic neuropathy, without long-term current use of insulin  Assessment & Plan:  Patient has not been checking glucose regularly at home. A1C always well controlled, generally 6.0% range, last checked two months ago. Patient tell me today she has been having some issues with diarrhea and bowel leakage for approximately the last six months. She thinks it may be due to her metformin. No nausea, vomiting, dyspepsia or abdominal pain.   -Requested she resume daily glucose checks, AM fasting.   -Decrease metformin to one tablet twice daily with planned follow up in three months. Patient to call sooner if home glucose readings become elevated.    Orders:  -     gabapentin (NEURONTIN) 300 MG capsule; Take 1 capsule by mouth 3 (Three) Times a Day.  Dispense: 90 capsule; Refill: 1    5. Coronary artery disease involving native coronary artery of native heart without angina pectoris  Assessment & Plan:  Patient underwent cardiac cath in April 2015 with findings of significant coronary artery disease involving the second diagonal branch.   Unfortunately vessel was too small for intervention so she was medically managed with Imdur 30 and 81 mg aspirin for quite some long time.  Now only continues to take aspirin daily.  She was previously followed by Dr. Kenyon at [facility], however she has failed to follow-up since 2019.  Patient states the subscapular type pain she is currently experiencing was very similar to what she was experiencing in 2015 that required cardiac catheterization.  EKG in office today is normal.  Patient would like referral to local cardiologist, Dr. Albert..    Orders:  -     ECG 12 Lead  -     Ambulatory Referral to Cardiology    6. Pigmented skin lesion suspicious for malignant neoplasm  -     Ambulatory Referral to Dermatology    7. Type 2 diabetes mellitus with diabetic polyneuropathy, without long-term current use of insulin  Assessment & Plan:  Patient has not been checking glucose regularly at home. A1C always well controlled, generally 6.0% range, last checked two months ago. Patient tell me today she has been having some issues with diarrhea and bowel leakage for approximately the last six months. She thinks it may be due to her metformin. No nausea, vomiting, dyspepsia or abdominal pain.   -Requested she resume daily glucose checks, AM fasting.   -Decrease metformin to one tablet twice daily with planned follow up in three months. Patient to call sooner if home glucose readings become elevated.        BMI is within normal parameters. No other follow-up for BMI required.        Follow Up  Return in about 3 months (around 4/25/2024) for Next scheduled follow up.    Nimo Hinds, APRN

## 2024-01-26 DIAGNOSIS — E11.40 TYPE 2 DIABETES MELLITUS WITH DIABETIC NEUROPATHY, WITHOUT LONG-TERM CURRENT USE OF INSULIN: ICD-10-CM

## 2024-01-26 RX ORDER — GABAPENTIN 300 MG/1
300 CAPSULE ORAL 3 TIMES DAILY
Qty: 90 CAPSULE | Refills: 1 | Status: SHIPPED | OUTPATIENT
Start: 2024-01-26

## 2024-01-31 ENCOUNTER — OFFICE VISIT (OUTPATIENT)
Dept: FAMILY MEDICINE CLINIC | Facility: CLINIC | Age: 79
End: 2024-01-31
Payer: MEDICARE

## 2024-01-31 VITALS
DIASTOLIC BLOOD PRESSURE: 60 MMHG | TEMPERATURE: 98.7 F | HEIGHT: 62 IN | SYSTOLIC BLOOD PRESSURE: 110 MMHG | BODY MASS INDEX: 22.89 KG/M2 | WEIGHT: 124.4 LBS | OXYGEN SATURATION: 99 % | HEART RATE: 71 BPM

## 2024-01-31 DIAGNOSIS — K59.09 CHRONIC CONSTIPATION: ICD-10-CM

## 2024-01-31 DIAGNOSIS — N30.20 CHRONIC CYSTITIS: Primary | ICD-10-CM

## 2024-01-31 PROCEDURE — 3074F SYST BP LT 130 MM HG: CPT | Performed by: NURSE PRACTITIONER

## 2024-01-31 PROCEDURE — 99213 OFFICE O/P EST LOW 20 MIN: CPT | Performed by: NURSE PRACTITIONER

## 2024-01-31 PROCEDURE — 1160F RVW MEDS BY RX/DR IN RCRD: CPT | Performed by: NURSE PRACTITIONER

## 2024-01-31 PROCEDURE — 3078F DIAST BP <80 MM HG: CPT | Performed by: NURSE PRACTITIONER

## 2024-01-31 PROCEDURE — 1159F MED LIST DOCD IN RCRD: CPT | Performed by: NURSE PRACTITIONER

## 2024-01-31 RX ORDER — NITROFURANTOIN 25; 75 MG/1; MG/1
100 CAPSULE ORAL 2 TIMES DAILY
Qty: 10 CAPSULE | Refills: 0 | Status: SHIPPED | OUTPATIENT
Start: 2024-01-31

## 2024-01-31 NOTE — ASSESSMENT & PLAN NOTE
Patient with long-term issues regarding constipation.  She notes she will go as long as 1 to 2 weeks without a bowel movement, oftentimes battles with firm and hard to pass stools when she does have a bowel movement.  She does not use a consistent bowel regimen.  She notes that she received good benefit from regular MiraLAX but that she is working regularly at [employer] she is afraid to take the medication.  She in the last 6 months has developed what she refers to as bowel leakage.  We discussed this is likely due to her constipation issues.  She is encouraged to increase dietary fiber and water consumption as well as use daily MiraLAX.  If no resolution will need to refer to gastroenterology

## 2024-01-31 NOTE — ASSESSMENT & PLAN NOTE
presents today for complaints of urinary frequency, urinary incontinence for the last 2 to 3 weeks.  Patient has longstanding history of chronic cystitis, having been evaluated by urology.  Patient was last seen by Dr. Vasques in 2021 at which time he put her on a regimen of preventative methenamine 1 g twice daily.  Patient has failed to comply with that regimen and is noted to have urinary tract infections approximately every 6 months at this point.  She is unable to produce a urine specimen in office today.  Will treat.  Please with 5-day course of Macrobid.  We also discussed reinstituting daily methenamine but she states she is unable to afford that medication.  Patient encouraged to continue taking daily cranberry supplement and drinking plenty of water.  If symptoms do not improve we will need to return to urology

## 2024-01-31 NOTE — PROGRESS NOTES
"    Office Note     Name: Earline Santacruz    : 1945     MRN: [id number]     Chief Complaint  Urinary Frequency    Subjective     History of Present Illness:  Earline Santacruz is a 79 y.o. female who presents today for complaints of urinary frequency, urinary incontinence for the last 2 to 3 weeks.  Patient has longstanding history of chronic cystitis, having been evaluated by urology.  Patient was last seen by Dr. Vasques in  at which time he put her on a regimen of preventative methenamine 1 g twice daily.  Patient has failed to comply with that regimen and is noted to have urinary tract infections approximately every 6 months at this point.  She is unable to produce a urine specimen in office today.  Patient also disclosed that she is having stool leakage and chronic constipation.  She has had constipation for a number of years but the stool leakage has just started in the last approximately 6 months.  Patient is not using a regular bowel regimen, states that she will go as long as 1 to 2 weeks without bowel movements.  Denies any abdominal pain, cramping or discomfort.  No decrease in appetite or unexplained weight loss.  She has no further complaints or concerns    Review of Systems   Constitutional:  Negative for appetite change, chills, fatigue and fever.   Respiratory:  Negative for cough, chest tightness, shortness of breath and wheezing.    Gastrointestinal:  Positive for constipation.        \"Stool leakage\"   Endocrine: Negative for polydipsia and polyuria.   Genitourinary:  Positive for frequency, urgency and urinary incontinence. Negative for difficulty urinating, flank pain, hematuria and pelvic pain.   Neurological:  Negative for dizziness, weakness, light-headedness and headache.   Psychiatric/Behavioral:  Positive for stress. Negative for agitation. The patient is nervous/anxious.        Objective     Past Medical History:   Diagnosis Date    Diabetes mellitus     Hyperlipidemia     " "Hypertension     Panic attacks      Past Surgical History:   Procedure Laterality Date    APPENDECTOMY      BREAST LUMPECTOMY      RIGHT     CATARACT EXTRACTION, BILATERAL      HAND SURGERY      LEFT     TOTAL ABDOMINAL HYSTERECTOMY       Family History   Problem Relation Age of Onset    No Known Problems Mother     No Known Problems Father        Vital Signs  /60 (BP Location: Left arm, Patient Position: Sitting, Cuff Size: Adult)   Pulse 71   Temp 98.7 °F (37.1 °C) (Temporal)   Ht 156.2 cm (61.5\")   Wt 56.4 kg (124 lb 6.4 oz)   SpO2 99%   BMI 23.12 kg/m²   Estimated body mass index is 23.12 kg/m² as calculated from the following:    Height as of this encounter: 156.2 cm (61.5\").    Weight as of this encounter: 56.4 kg (124 lb 6.4 oz).    Physical Exam  Vitals reviewed.   Constitutional:       Appearance: Normal appearance.   Cardiovascular:      Rate and Rhythm: Normal rate and regular rhythm.      Pulses: Normal pulses.      Heart sounds: Normal heart sounds.   Pulmonary:      Effort: Pulmonary effort is normal.   Abdominal:      Palpations: Abdomen is soft.   Genitourinary:     Exam position: Lithotomy position.      Urethra: No prolapse, urethral pain, urethral swelling or urethral lesion.      Vagina: No prolapsed vaginal walls.      Rectum: No tenderness or internal hemorrhoid. Normal anal tone.   Musculoskeletal:         General: Normal range of motion.   Skin:     General: Skin is warm and dry.   Neurological:      Mental Status: She is alert and oriented to person, place, and time.             POCT Results (if applicable):  Results for orders placed or performed in visit on 10/27/23   Covid-19 + Flu A&B AG, Veritor    Specimen: Swab   Result Value Ref Range    SARS Antigen Not Detected Not Detected, Presumptive Negative    Influenza A Antigen BENOIT Not Detected Not Detected    Influenza B Antigen BENOIT Not Detected Not Detected    Internal Control Passed Passed    Lot Number [id number]     " Expiration Date 10,[id number]             Assessment and Plan     Diagnoses and all orders for this visit:    1. Chronic cystitis (Primary)  Assessment & Plan:  presents today for complaints of urinary frequency, urinary incontinence for the last 2 to 3 weeks.  Patient has longstanding history of chronic cystitis, having been evaluated by urology.  Patient was last seen by Dr. Vasques in 2021 at which time he put her on a regimen of preventative methenamine 1 g twice daily.  Patient has failed to comply with that regimen and is noted to have urinary tract infections approximately every 6 months at this point.  She is unable to produce a urine specimen in office today.  Will treat.  Please with 5-day course of Macrobid.  We also discussed reinstituting daily methenamine but she states she is unable to afford that medication.  Patient encouraged to continue taking daily cranberry supplement and drinking plenty of water.  If symptoms do not improve we will need to return to urology      2. Chronic constipation  Assessment & Plan:  Patient with long-term issues regarding constipation.  She notes she will go as long as 1 to 2 weeks without a bowel movement, oftentimes battles with firm and hard to pass stools when she does have a bowel movement.  She does not use a consistent bowel regimen.  She notes that she received good benefit from regular MiraLAX but that she is working regularly at [employer] she is afraid to take the medication.  She in the last 6 months has developed what she refers to as bowel leakage.  We discussed this is likely due to her constipation issues.  She is encouraged to increase dietary fiber and water consumption as well as use daily MiraLAX.  If no resolution will need to refer to gastroenterology      Other orders  -     nitrofurantoin, macrocrystal-monohydrate, (Macrobid) 100 MG capsule; Take 1 capsule by mouth 2 (Two) Times a Day.  Dispense: 10 capsule; Refill: 0      BMI is within normal  parameters. No other follow-up for BMI required.        Follow Up  No follow-ups on file.    Nimo Hinds, APRN

## 2024-02-08 DIAGNOSIS — F32.A ANXIETY AND DEPRESSION: ICD-10-CM

## 2024-02-08 DIAGNOSIS — F41.9 ANXIETY AND DEPRESSION: ICD-10-CM

## 2024-02-08 RX ORDER — ALPRAZOLAM 0.5 MG/1
0.5 TABLET ORAL 2 TIMES DAILY PRN
Qty: 60 TABLET | Refills: 0 | Status: SHIPPED | OUTPATIENT
Start: 2024-02-08

## 2024-02-08 NOTE — TELEPHONE ENCOUNTER
"Caller: Earline Santacruz \"Earline yoon\"    Relationship: Self    Best call back number: [phone]    Requested Prescriptions:   Requested Prescriptions     Pending Prescriptions Disp Refills    ALPRAZolam (XANAX) 0.5 MG tablet 60 tablet 0     Sig: Take 1 tablet by mouth 2 (Two) Times a Day As Needed for Anxiety.        Pharmacy where request should be sent: [facility] [address] [place] [address] [phone] [facility] [phone]      Last office visit with prescribing clinician: 1/31/2024   Last telemedicine visit with prescribing clinician: 11/6/2023   Next office visit with prescribing clinician: 3/7/2024     Additional details provided by patient:  [facility] HAS BEEN SOLD AND CAN NOT FILL THIS TYPE OF MEDICATION AT THIS TIME. PATIENT HAS 1 DAY REMAINING AND NEEDS A NEW PRESCRIPTION SENT TO [facility]. PLEASE ADVISE     Does the patient have less than a 3 day supply:  [x] Yes  [] No    Would you like a call back once the refill request has been completed: [x] Yes [] No    If the office needs to give you a call back, can they leave a voicemail: [x] Yes [] No    Jt Wilde Rep   02/08/24 11:54 EST           " [Symptom and Test Evaluation] : symptom and test evaluation [Cardiac Failure] : cardiac failure

## 2024-02-20 DIAGNOSIS — K21.9 GASTROESOPHAGEAL REFLUX DISEASE WITHOUT ESOPHAGITIS: ICD-10-CM

## 2024-02-20 RX ORDER — ROSUVASTATIN CALCIUM 20 MG/1
20 TABLET, COATED ORAL DAILY
Qty: 90 TABLET | Refills: 2 | Status: SHIPPED | OUTPATIENT
Start: 2024-02-20

## 2024-02-20 RX ORDER — PANTOPRAZOLE SODIUM 40 MG/1
40 TABLET, DELAYED RELEASE ORAL DAILY
Qty: 30 TABLET | Refills: 3 | Status: SHIPPED | OUTPATIENT
Start: 2024-02-20

## 2024-02-20 NOTE — TELEPHONE ENCOUNTER
"    Caller: Earline Santacruz \"Earline yoon\"    Relationship: Self    Best call back number: [phone]     Requested Prescriptions:   Requested Prescriptions     Pending Prescriptions Disp Refills    rosuvastatin (CRESTOR) 20 MG tablet 90 tablet 2     Sig: Take 1 tablet by mouth Daily. Need lab work    pantoprazole (PROTONIX) 40 MG EC tablet 30 tablet 3     Sig: Take 1 tablet by mouth Daily.        Pharmacy where request should be sent: [address] [phone] [facility] [phone] [facility]     Last office visit with prescribing clinician: 1/31/2024   Last telemedicine visit with prescribing clinician: 11/6/2023   Next office visit with prescribing clinician: 3/7/2024     Additional details provided by patient:      Does the patient have less than a 3 day supply:  [x] Yes  [] No    Would you like a call back once the refill request has been completed: [] Yes [x] No    If the office needs to give you a call back, can they leave a voicemail: [] Yes [x] No    Jt Leavitt Rep   02/20/24 11:39 EST       "

## 2024-02-22 DIAGNOSIS — K21.9 GASTROESOPHAGEAL REFLUX DISEASE WITHOUT ESOPHAGITIS: ICD-10-CM

## 2024-02-22 RX ORDER — SUCRALFATE 1 G/1
1 TABLET ORAL 3 TIMES DAILY
Qty: 90 TABLET | Refills: 0 | Status: SHIPPED | OUTPATIENT
Start: 2024-02-22

## 2024-03-06 ENCOUNTER — OFFICE VISIT (OUTPATIENT)
Dept: CARDIOLOGY | Facility: CLINIC | Age: 79
End: 2024-03-06
Payer: MEDICARE

## 2024-03-06 VITALS
WEIGHT: 128 LBS | OXYGEN SATURATION: 98 % | BODY MASS INDEX: 29.62 KG/M2 | SYSTOLIC BLOOD PRESSURE: 144 MMHG | DIASTOLIC BLOOD PRESSURE: 66 MMHG | HEART RATE: 74 BPM | HEIGHT: 55 IN

## 2024-03-06 DIAGNOSIS — I10 HYPERTENSION, ESSENTIAL: ICD-10-CM

## 2024-03-06 DIAGNOSIS — I25.10 CORONARY ARTERY DISEASE INVOLVING NATIVE CORONARY ARTERY OF NATIVE HEART WITHOUT ANGINA PECTORIS: Primary | ICD-10-CM

## 2024-03-06 DIAGNOSIS — E78.00 PURE HYPERCHOLESTEROLEMIA: ICD-10-CM

## 2024-03-06 PROCEDURE — 93000 ELECTROCARDIOGRAM COMPLETE: CPT | Performed by: INTERNAL MEDICINE

## 2024-03-06 PROCEDURE — 3078F DIAST BP <80 MM HG: CPT | Performed by: INTERNAL MEDICINE

## 2024-03-06 PROCEDURE — 3077F SYST BP >= 140 MM HG: CPT | Performed by: INTERNAL MEDICINE

## 2024-03-06 PROCEDURE — 99204 OFFICE O/P NEW MOD 45 MIN: CPT | Performed by: INTERNAL MEDICINE

## 2024-03-06 RX ORDER — PANTOPRAZOLE SODIUM 20 MG/1
40 TABLET, DELAYED RELEASE ORAL DAILY
COMMUNITY

## 2024-03-06 NOTE — PROGRESS NOTES
[facility] Provider Note     Date:   2024   Name: Earline Santacruz  :   1945  PCP: Nimo Hinds APRN    Chief Complaint   Patient presents with    [facility]       Subjective     History of Present Illness  Earline Santacruz is a 79 y.o. female who presents today for new patient appointment for some chest pain.  She has a family history that concerns her.  [facility] with Dr. Kenyon years ago with significant disease that was not amenable to stenting.  She describes her chest pain as pain in the shoulder blades, both sides.  Mostly in her back.  Does not change with movement.  Does not change with exertion.  No shortness of breath.  No lower extremity edema.  Cousin had widowmaker.    Cardiac history  1.  Left heart cath 2015  FINAL IMPRESSIONS:   1.  There was significant coronary artery disease that involved the second  diagonal branch, but this was a 0.5 mm in diameter and too small for  intervention.   2.  Based on the mild calcification noted in multiple arteries proximally,  there was a fair amount of plaque burden within the walls of the vessel,  although there was minimal disease noted within the vessel itself.   3.  Mildly elevated left ventricular end-diastolic pressure at the time of  cardiac catheterization with evidence of diastolic dysfunction by  echocardiogram.       Allergies   Allergen Reactions    Sulfa Antibiotics Headache     Headaches       Current Outpatient Medications:     ALPRAZolam (XANAX) 0.5 MG tablet, Take 1 tablet by mouth 2 (Two) Times a Day As Needed for Anxiety., Disp: 60 tablet, Rfl: 0    aspirin 81 MG EC tablet, Take 1 tablet by mouth Daily., Disp: 30 tablet, Rfl: 2    coenzyme Q10 100 MG capsule, Take 1 capsule by mouth Daily., Disp: 30 capsule, Rfl: 2    gabapentin (NEURONTIN) 300 MG capsule, Take 1 capsule by mouth 3 (Three) Times a Day., Disp: 90 capsule, Rfl: 1    levothyroxine (SYNTHROID, LEVOTHROID) 50 MCG tablet,  "Take 1 tablet by mouth Daily., Disp: 90 tablet, Rfl: 1    metFORMIN (GLUCOPHAGE) 500 MG tablet, Take 1 tablet by mouth 2 (Two) Times a Day With Meals., Disp: 180 tablet, Rfl: 1    pantoprazole (PROTONIX) 20 MG EC tablet, Take 2 tablets by mouth Daily., Disp: , Rfl:     rosuvastatin (CRESTOR) 20 MG tablet, Take 1 tablet by mouth Daily. Need lab work, Disp: 90 tablet, Rfl: 2    sucralfate (CARAFATE) 1 g tablet, TAKE 1 TABLET BY MOUTH THREE TIMES DAILY, Disp: 90 tablet, Rfl: 0    Past Medical History:   Diagnosis Date    Diabetes mellitus     Hyperlipidemia     Hypertension     Panic attacks       Past Surgical History:   Procedure Laterality Date    APPENDECTOMY      BREAST LUMPECTOMY      RIGHT     CATARACT EXTRACTION, BILATERAL      HAND SURGERY      LEFT     TOTAL ABDOMINAL HYSTERECTOMY       Family History   Problem Relation Age of Onset    No Known Problems Mother     No Known Problems Father      Social History     Socioeconomic History    Marital status:    Tobacco Use    Smoking status: Never     Passive exposure: Never    Smokeless tobacco: Never   Vaping Use    Vaping status: Never Used   Substance and Sexual Activity    Alcohol use: No    Drug use: No    Sexual activity: Defer       Objective     Vital Signs:  /66 (BP Location: Right arm)   Pulse 74   Ht 61.5 cm (24.21\")   Wt 58.1 kg (128 lb)   SpO2 98%   .50 kg/m²   Estimated body mass index is 153.5 kg/m² as calculated from the following:    Height as of this encounter: 61.5 cm (24.21\").    Weight as of this encounter: 58.1 kg (128 lb).         Physical Exam  Vitals reviewed.   Constitutional:       General: She is not in acute distress.     Appearance: Normal appearance.   HENT:      Head: Normocephalic and atraumatic.      Mouth/Throat:      Mouth: Mucous membranes are moist.   Eyes:      Conjunctiva/sclera: Conjunctivae normal.   Neck:      Vascular: No carotid bruit.   Cardiovascular:      Rate and Rhythm: Normal rate and " regular rhythm.      Pulses: Normal pulses.      Heart sounds: Normal heart sounds. No murmur heard.  Pulmonary:      Effort: Pulmonary effort is normal. No respiratory distress.      Breath sounds: Normal breath sounds. No wheezing or rhonchi.   Abdominal:      General: Abdomen is flat.      Palpations: Abdomen is soft.   Musculoskeletal:      Cervical back: Normal range of motion and neck supple.      Right lower leg: No edema.      Left lower leg: No edema.   Skin:     General: Skin is warm and dry.      Coloration: Skin is not jaundiced.   Neurological:      General: No focal deficit present.      Mental Status: She is alert and oriented to person, place, and time. Mental status is at baseline.      GCS: GCS eye subscore is 4. GCS verbal subscore is 5. GCS motor subscore is 6.      Cranial Nerves: No cranial nerve deficit.      Motor: No weakness.      Gait: Gait normal.   Psychiatric:         Mood and Affect: Mood and affect normal. Mood is not anxious.         Speech: Speech normal.         Behavior: Behavior normal.                 ECG 12 Lead    Date/Time: 3/6/2024 4:47 PM  Performed by: Tata Albert MD    Authorized by: Tata Albert MD  Comparison: compared with previous ECG from 6/30/2023  Comparison to previous ECG: Low voltage now  Rhythm: sinus rhythm  Rate: normal  Conduction: conduction normal  ST Segments: ST segments normal  T Waves: T waves normal  QRS axis: normal  Other findings: low voltage    Clinical impression: non-specific ECG           Assessment and Plan     Diagnoses and all orders for this visit:    1. Coronary artery disease involving native coronary artery of native heart without angina pectoris (Primary)  Comments:  As diagnosed by prior heart cath 2015.  Plan stress testing to update because of new symptoms.  On good medical therapy.  Orders:  -     Adult Stress Echo W/ Cont or Stress Agent if Necessary Per Protocol; Future  -     ECG 12 Lead; Future  -     ECG 12  Lead    2. Pure hypercholesterolemia  Comments:  Controlled on last lipid panel, LDL 60 in 9/2023    3. Hypertension, essential  Comments:  Well-controlled.  Continue current medications.        Recommendations: ER if symptoms increase and Report if any new/changing symptoms immediately      Follow Up  Return in about 6 weeks (around 4/17/2024) for results.    Tata Albert MD   03/06/2024     Please note that this explicitly excludes time spent on other separate billable services such as performing procedures or test interpretation, when applicable.    This note was created using dictation software which occasionally transcribes nonsensical phrases. Please contact the provider if any clarification is needed.

## 2024-03-07 ENCOUNTER — OFFICE VISIT (OUTPATIENT)
Dept: FAMILY MEDICINE CLINIC | Facility: CLINIC | Age: 79
End: 2024-03-07
Payer: MEDICARE

## 2024-03-07 VITALS
HEIGHT: 61 IN | OXYGEN SATURATION: 98 % | SYSTOLIC BLOOD PRESSURE: 138 MMHG | RESPIRATION RATE: 18 BRPM | TEMPERATURE: 97.1 F | DIASTOLIC BLOOD PRESSURE: 82 MMHG | BODY MASS INDEX: 24.35 KG/M2 | WEIGHT: 129 LBS | HEART RATE: 74 BPM

## 2024-03-07 DIAGNOSIS — E11.42 TYPE 2 DIABETES MELLITUS WITH DIABETIC POLYNEUROPATHY, WITHOUT LONG-TERM CURRENT USE OF INSULIN: ICD-10-CM

## 2024-03-07 DIAGNOSIS — K59.09 CHRONIC CONSTIPATION: ICD-10-CM

## 2024-03-07 DIAGNOSIS — I10 PRIMARY HYPERTENSION: ICD-10-CM

## 2024-03-07 DIAGNOSIS — K21.9 GASTROESOPHAGEAL REFLUX DISEASE WITHOUT ESOPHAGITIS: ICD-10-CM

## 2024-03-07 DIAGNOSIS — E03.9 ACQUIRED HYPOTHYROIDISM: ICD-10-CM

## 2024-03-07 DIAGNOSIS — I25.10 CORONARY ARTERY DISEASE INVOLVING NATIVE CORONARY ARTERY OF NATIVE HEART WITHOUT ANGINA PECTORIS: Primary | ICD-10-CM

## 2024-03-07 DIAGNOSIS — F32.A ANXIETY AND DEPRESSION: ICD-10-CM

## 2024-03-07 DIAGNOSIS — F41.9 ANXIETY AND DEPRESSION: ICD-10-CM

## 2024-03-07 LAB
EXPIRATION DATE: ABNORMAL
HBA1C MFR BLD: 7 % (ref 4.5–5.7)
Lab: ABNORMAL

## 2024-03-07 NOTE — ASSESSMENT & PLAN NOTE
Patient is prescribed regimen of metformin 500 mg twice daily for her type 2 diabetes.  She tells me today that she has discontinued use thinking perhaps it was contributing to her recent bowel incontinence.  As result her A1c is 7.0% in office today.  Patient has not been checking her glucose at home.  Historically been taking her metformin as prescribed her A1c was generally in the 6% range.  We discussed that her bowel leakage is due to issues with chronic constipation.  She is to resume her metformin in order to get A1c back to goal.

## 2024-03-07 NOTE — PROGRESS NOTES
Office Note     Name: Earline Santacruz    : 1945     MRN: [id number]     Chief Complaint  Follow-up    Subjective     History of Present Illness:  Earline Santacruz is a 79 y.o. female who presents today for follow-up on chronic conditions.  Patient is followed for anxiety, depression, CAD, GERD, HTN, hypothyroid and type 2 diabetes.  Patient is prescribed regimen of metformin 500 mg twice daily for her type 2 diabetes.  She tells me today that she has discontinued use thinking perhaps it was contributing to her recent bowel incontinence.  As result her A1c is 7.0% in office today.  Patient has been checking her glucose at home with any regularity.  Patient recently resumed cardiology care due to some subscapular discomfort and feelings of chronic indigestion.  Patient had heart cath in  with findings of significant coronary artery disease involving the second diagonal branch.  Given the small size of the vessel there was no intervention to be performed, she has been medically managed well with daily aspirin.  She is now established with Dr. Albert with plans for stress test for further evaluation.  Patient's blood pressure is well-controlled in office today, 130/82.  Patient states her mood remains stable, she did have a bout of serious depression anxiety just after the unexpected death of her  and daughter within a very short amount of time.  She continues to sparingly take alprazolam 0.5 mg once to twice daily as needed for anxiety.  She has no complaints or concerns today    Review of Systems   Constitutional:  Negative for activity change and fatigue.   Eyes:  Negative for visual disturbance.   Respiratory:  Negative for cough, chest tightness, shortness of breath and wheezing.    Cardiovascular:  Negative for chest pain, palpitations and leg swelling.   Gastrointestinal:  Positive for constipation. Negative for abdominal pain, diarrhea, nausea and vomiting.   Genitourinary:  Negative  "for difficulty urinating, flank pain and hematuria.   Musculoskeletal:  Negative for arthralgias and myalgias.   Neurological:  Negative for dizziness, syncope, weakness, light-headedness and headache.   Psychiatric/Behavioral:  Negative for agitation, self-injury, suicidal ideas, depressed mood and stress. The patient is not nervous/anxious.        Objective     Past Medical History:   Diagnosis Date    Diabetes mellitus     Hyperlipidemia     Hypertension     Panic attacks      Past Surgical History:   Procedure Laterality Date    APPENDECTOMY      BREAST LUMPECTOMY      RIGHT     CATARACT EXTRACTION, BILATERAL      HAND SURGERY      LEFT     TOTAL ABDOMINAL HYSTERECTOMY       Family History   Problem Relation Age of Onset    No Known Problems Mother     No Known Problems Father        Vital Signs  /82 (BP Location: Left arm, Patient Position: Sitting, Cuff Size: Adult)   Pulse 74   Temp 97.1 °F (36.2 °C) (Temporal)   Resp 18   Ht 154.9 cm (61\")   Wt 58.5 kg (129 lb)   SpO2 98%   BMI 24.37 kg/m²   Estimated body mass index is 24.37 kg/m² as calculated from the following:    Height as of this encounter: 154.9 cm (61\").    Weight as of this encounter: 58.5 kg (129 lb).  Facility age limit for growth %edwardo is 20 years.    Physical Exam  Vitals reviewed.   Constitutional:       Appearance: Normal appearance.   HENT:      Head: Normocephalic and atraumatic.      Right Ear: Tympanic membrane, ear canal and external ear normal.      Left Ear: Tympanic membrane, ear canal and external ear normal.      Nose: Nose normal.      Mouth/Throat:      Mouth: Mucous membranes are moist.      Pharynx: Oropharynx is clear.   Eyes:      Conjunctiva/sclera: Conjunctivae normal.      Pupils: Pupils are equal, round, and reactive to light.   Cardiovascular:      Rate and Rhythm: Normal rate and regular rhythm.      Pulses: Normal pulses.      Heart sounds: Normal heart sounds.   Pulmonary:      Effort: Pulmonary effort is " normal.      Breath sounds: Normal breath sounds.   Abdominal:      General: Bowel sounds are normal.      Palpations: Abdomen is soft.   Musculoskeletal:         General: Normal range of motion.      Cervical back: Neck supple.   Skin:     General: Skin is warm and dry.   Neurological:      Mental Status: She is alert and oriented to person, place, and time.             POCT Results (if applicable):  Results for orders placed or performed in visit on 03/07/24   POC Glycosylated Hemoglobin (Hb A1C)    Specimen: Blood   Result Value Ref Range    Hemoglobin A1C 7.0 (A) 4.5 - 5.7 %    Lot Number [id number]     Expiration Date 11,212,025             Assessment and Plan     Diagnoses and all orders for this visit:    1. Coronary artery disease involving native coronary artery of native heart without angina pectoris (Primary)  Assessment & Plan:  Patient recently resumed cardiology care due to some subscapular discomfort and feelings of chronic indigestion.  Patient had heart cath in 2015 with findings of significant coronary artery disease involving the second diagonal branch.  Given the small size of the vessel there was no intervention to be performed, she has been medically managed well with daily aspirin.  She is now established with Dr. Albert with plans for stress test for further evaluation.       2. Gastroesophageal reflux disease without esophagitis  Assessment & Plan:  Four months ago she noted new onset heartburn, almost daily the week prior to presentation.  By the time she was evaluated she noted frequency has subsided approximately 4 days prior.  She noticed most of the episodes happened while at work and responded well to Rolaids or Tums.  She was initiated on omeprazole 20 mg once daily.  Initially she did well with omeprazole 20 mg daily, though during her visit stated she was often having to repeat the dose later in the day.  During her last visit as result she was increased to omeprazole 40 mg daily  and initiation sucralfate.  Patient states she is doing very well on this regimen      3. Primary hypertension  Assessment & Plan:  Blood pressure remains very well controlled without antihypertensive medications 138/82 in office today.      4. Acquired hypothyroidism  Assessment & Plan:  Patient currently utilizing levothyroxine 50 mcg daily with continued euthyroid on lab work      5. Type 2 diabetes mellitus with diabetic polyneuropathy, without long-term current use of insulin  Assessment & Plan:  Patient is prescribed regimen of metformin 500 mg twice daily for her type 2 diabetes.  She tells me today that she has discontinued use thinking perhaps it was contributing to her recent bowel incontinence.  As result her A1c is 7.0% in office today.  Patient has not been checking her glucose at home.  Historically been taking her metformin as prescribed her A1c was generally in the 6% range.  We discussed that her bowel leakage is due to issues with chronic constipation.  She is to resume her metformin in order to get A1c back to goal.    Orders:  -     POC Glycosylated Hemoglobin (Hb A1C)    6. Anxiety and depression  Assessment & Plan:  Patient is doing extremely well after a rough start to 2023.  She lost both her daughter and  in a matter of months.  After those events she has gotten a job as a  at [employer], having been there 9 months now.  She states this has been very good for her mental health, not really struggling with any anxiety or depression currently.  She has discontinued her antidepressant therapy.  She has a longstanding pattern of anxiety with intermittent panic for which she has utilized alprazolam 0.5 mg twice daily as needed.  Trve report reviewed and satisfactory.  Will continue current very sparing dose of alprazolam for panic as needed      7. Chronic constipation  Assessment & Plan:  atient with long-term issues regarding constipation. She notes she will go as long as 1 to 2  weeks without a bowel movement, oftentimes battles with firm and hard to pass stools when she does have a bowel movement. She does not use a consistent bowel regimen.  During her last visit, she notes that she received good benefit from regular MiraLAX but that she is working regularly at [employer] she is afraid to take the medication. She in the last 6 months has developed what she refers to as bowel leakage. We discussed this is likely due to her constipation issues. She was encouraged to increase dietary fiber and water consumption as well as use daily MiraLAX.  Patient states that she did do daily MiraLAX as discussed significant benefit, though she again has developed some issues with noncompliance and her symptoms have returned.  Patient encouraged to stay on regimen for extended amount of time at least until her next visit in 3 months.        BMI is within normal parameters. No other follow-up for BMI required.    Follow Up  Return in about 3 months (around 6/7/2024) for Next scheduled follow up.    Nimo Hinds, APRN

## 2024-03-08 ENCOUNTER — PATIENT ROUNDING (BHMG ONLY) (OUTPATIENT)
Dept: CARDIOLOGY | Facility: CLINIC | Age: 79
End: 2024-03-08
Payer: MEDICARE

## 2024-03-08 DIAGNOSIS — F32.A ANXIETY AND DEPRESSION: ICD-10-CM

## 2024-03-08 DIAGNOSIS — F41.9 ANXIETY AND DEPRESSION: ICD-10-CM

## 2024-03-08 RX ORDER — ALPRAZOLAM 0.5 MG/1
0.5 TABLET ORAL 2 TIMES DAILY PRN
Qty: 60 TABLET | Refills: 0 | Status: SHIPPED | OUTPATIENT
Start: 2024-03-08

## 2024-03-08 NOTE — PROGRESS NOTES
March 8, 2024    Hello, may I speak with Earline Santacruz?    My name is CAMI      I am  with [facility]  [facility]  [address] [place] [id number] [phone].    Before we get started may I verify your date of birth? 1945    I am calling to officially welcome you to our practice and ask about your recent visit. Is this a good time to talk? yes    Tell me about your visit with us. What things went well?  EVERYTHING WAS AMAZING. EVERYONE WAS SO FRIENDLY. IM REALLY GLAD LOREN SENT ME TO YOUR CLINIC       We're always looking for ways to make our patients' experiences even better. Do you have recommendations on ways we may improve?  no, EVERYTHING WAS WONDERFUL    Overall were you satisfied with your first visit to our practice? yes       I appreciate you taking the time to speak with me today. Is there anything else I can do for you? no      Thank you, and have a great day.

## 2024-03-08 NOTE — TELEPHONE ENCOUNTER
"Caller: Earline Santacruz \"Earline yoon\"    Relationship: Self    Best call back number: [phone]     Requested Prescriptions:   Requested Prescriptions     Pending Prescriptions Disp Refills    ALPRAZolam (XANAX) 0.5 MG tablet 60 tablet 0     Sig: Take 1 tablet by mouth 2 (Two) Times a Day As Needed for Anxiety.        Pharmacy where request should be sent: [address] [phone] [facility] [phone]      Last office visit with prescribing clinician: 3/7/2024   Last telemedicine visit with prescribing clinician: 11/6/2023   Next office visit with prescribing clinician: Visit date not found     Additional details provided by patient: PATIENT IS COMPLETELY OUT OF THIS MEDICATION        Does the patient have less than a 3 day supply:  [x] Yes  [] No    Would you like a call back once the refill request has been completed: [] Yes [x] No    If the office needs to give you a call back, can they leave a voicemail: [] Yes [x] No    Jt Givens Rep   03/08/24 11:07 EST     "

## 2024-03-09 PROBLEM — I10 HYPERTENSION: Chronic | Status: ACTIVE | Noted: 2019-10-23

## 2024-03-09 NOTE — ASSESSMENT & PLAN NOTE
Patient recently resumed cardiology care due to some subscapular discomfort and feelings of chronic indigestion.  Patient had heart cath in 2015 with findings of significant coronary artery disease involving the second diagonal branch.  Given the small size of the vessel there was no intervention to be performed, she has been medically managed well with daily aspirin.  She is now established with Dr. Albert with plans for stress test for further evaluation.

## 2024-03-09 NOTE — ASSESSMENT & PLAN NOTE
Patient is doing extremely well after a rough start to 2023.  She lost both her daughter and  in a matter of months.  After those events she has gotten a job as a  at [employer], having been there 9 months now.  She states this has been very good for her mental health, not really struggling with any anxiety or depression currently.  She has discontinued her antidepressant therapy.  She has a longstanding pattern of anxiety with intermittent panic for which she has utilized alprazolam 0.5 mg twice daily as needed.  Trev report reviewed and satisfactory.  Will continue current very sparing dose of alprazolam for panic as needed

## 2024-03-09 NOTE — ASSESSMENT & PLAN NOTE
atient with long-term issues regarding constipation. She notes she will go as long as 1 to 2 weeks without a bowel movement, oftentimes battles with firm and hard to pass stools when she does have a bowel movement. She does not use a consistent bowel regimen.  During her last visit, she notes that she received good benefit from regular MiraLAX but that she is working regularly at [employer] she is afraid to take the medication. She in the last 6 months has developed what she refers to as bowel leakage. We discussed this is likely due to her constipation issues. She was encouraged to increase dietary fiber and water consumption as well as use daily MiraLAX.  Patient states that she did do daily MiraLAX as discussed significant benefit, though she again has developed some issues with noncompliance and her symptoms have returned.  Patient encouraged to stay on regimen for extended amount of time at least until her next visit in 3 months.

## 2024-03-09 NOTE — ASSESSMENT & PLAN NOTE
Four months ago she noted new onset heartburn, almost daily the week prior to presentation.  By the time she was evaluated she noted frequency has subsided approximately 4 days prior.  She noticed most of the episodes happened while at work and responded well to Rolaids or Tums.  She was initiated on omeprazole 20 mg once daily.  Initially she did well with omeprazole 20 mg daily, though during her visit stated she was often having to repeat the dose later in the day.  During her last visit as result she was increased to omeprazole 40 mg daily and initiation sucralfate.  Patient states she is doing very well on this regimen

## 2024-03-09 NOTE — ASSESSMENT & PLAN NOTE
Blood pressure remains very well controlled without antihypertensive medications 138/82 in office today.

## 2024-03-29 ENCOUNTER — OFFICE VISIT (OUTPATIENT)
Dept: FAMILY MEDICINE CLINIC | Facility: CLINIC | Age: 79
End: 2024-03-29
Payer: MEDICARE

## 2024-03-29 VITALS
SYSTOLIC BLOOD PRESSURE: 118 MMHG | BODY MASS INDEX: 24.55 KG/M2 | DIASTOLIC BLOOD PRESSURE: 82 MMHG | OXYGEN SATURATION: 98 % | HEART RATE: 70 BPM | HEIGHT: 61 IN | WEIGHT: 130 LBS | TEMPERATURE: 98.2 F | RESPIRATION RATE: 18 BRPM

## 2024-03-29 DIAGNOSIS — H90.A21 SENSORINEURAL HEARING LOSS (SNHL) OF RIGHT EAR WITH RESTRICTED HEARING OF LEFT EAR: Primary | ICD-10-CM

## 2024-03-29 PROCEDURE — 1160F RVW MEDS BY RX/DR IN RCRD: CPT | Performed by: NURSE PRACTITIONER

## 2024-03-29 PROCEDURE — 99213 OFFICE O/P EST LOW 20 MIN: CPT | Performed by: NURSE PRACTITIONER

## 2024-03-29 PROCEDURE — 3074F SYST BP LT 130 MM HG: CPT | Performed by: NURSE PRACTITIONER

## 2024-03-29 PROCEDURE — 1159F MED LIST DOCD IN RCRD: CPT | Performed by: NURSE PRACTITIONER

## 2024-03-29 PROCEDURE — 3079F DIAST BP 80-89 MM HG: CPT | Performed by: NURSE PRACTITIONER

## 2024-03-29 NOTE — PROGRESS NOTES
"    Office Note     Name: Earline Santacruz    : 1945     MRN: [id number]     Chief Complaint  patient having trouble hearing (Needs a referral )    Subjective     History of Present Illness:  Earline Santacruz is a 79 y.o. female who presents today for complaints of significant hearing loss, particularly in her right ear.  She attempted to seek evaluation from audiology on her not home but states she needs a referral for her insurance.  Patient is unsure if her hearing loss has been gradual but over the last 2 to 3 weeks has noticed it is impossible for her to hear anyone on her cell phone.  At her granddaughter's urging she is finally getting evaluated.  Patient denies any ear pain, ear drainage or discharge, dizziness or headache.  She does not feel that she has been exposed to any extremely loud noises over the years.  Patient does make note that in her youth she would regularly \"have her eardrums cut drain infection out\".  No further complaints or concerns    Review of Systems   Constitutional:  Negative for fatigue.   HENT:  Positive for hearing loss. Negative for ear discharge, ear pain and facial swelling.    Respiratory:  Negative for cough, chest tightness, shortness of breath and wheezing.    Neurological:  Negative for dizziness, syncope, weakness, light-headedness and headache.       Objective     Past Medical History:   Diagnosis Date    Diabetes mellitus     Hyperlipidemia     Hypertension     Panic attacks      Past Surgical History:   Procedure Laterality Date    APPENDECTOMY      BREAST LUMPECTOMY      RIGHT     CATARACT EXTRACTION, BILATERAL      HAND SURGERY      LEFT     TOTAL ABDOMINAL HYSTERECTOMY       Family History   Problem Relation Age of Onset    No Known Problems Mother     No Known Problems Father        Vital Signs  /82 (BP Location: Left arm, Patient Position: Sitting, Cuff Size: Adult)   Pulse 70   Temp 98.2 °F (36.8 °C) (Temporal)   Resp 18   Ht 154.9 cm (61\")  " " Wt 59 kg (130 lb)   SpO2 98%   BMI 24.56 kg/m²   Estimated body mass index is 24.56 kg/m² as calculated from the following:    Height as of this encounter: 154.9 cm (61\").    Weight as of this encounter: 59 kg (130 lb).  Facility age limit for growth %edwardo is 20 years.    Physical Exam  Constitutional:       Appearance: Normal appearance.   HENT:      Right Ear: Ear canal and external ear normal. Tympanic membrane is scarred.      Left Ear: Ear canal and external ear normal. Tympanic membrane is scarred.      Ears:      Estevez exam findings: Lateralizes left.     Right Rinne: AC > BC.     Left Rinne: AC > BC.     Nose: Nose normal.      Mouth/Throat:      Mouth: Mucous membranes are moist.      Pharynx: Oropharynx is clear.   Eyes:      Conjunctiva/sclera: Conjunctivae normal.      Pupils: Pupils are equal, round, and reactive to light.   Cardiovascular:      Rate and Rhythm: Normal rate and regular rhythm.      Pulses: Normal pulses.      Heart sounds: Normal heart sounds.   Pulmonary:      Effort: Pulmonary effort is normal.      Breath sounds: Normal breath sounds.   Musculoskeletal:      Cervical back: Neck supple.   Skin:     General: Skin is warm and dry.   Neurological:      Mental Status: She is alert and oriented to person, place, and time.        POCT Results (if applicable):  Results for orders placed or performed in visit on 03/07/24   POC Glycosylated Hemoglobin (Hb A1C)    Specimen: Blood   Result Value Ref Range    Hemoglobin A1C 7.0 (A) 4.5 - 5.7 %    Lot Number [id number]     Expiration Date 11,212,025             Assessment and Plan     Diagnoses and all orders for this visit:    1. Sensorineural hearing loss (SNHL) of right ear with restricted hearing of left ear (Primary)  Assessment & Plan:  She attempted to seek evaluation from audiology on her not home but states she needs a referral for her insurance.  Patient is unsure if her hearing loss has been gradual but over the last 2 to 3 weeks " "has noticed it is impossible for her to hear anyone on her cell phone.  At her granddaughter's urging she is finally getting evaluated.  Patient denies any ear pain, ear drainage or discharge, dizziness or headache.  She does not feel that she has been exposed to any extremely loud noises over the years.  Patient does make note that in her youth she would regularly \"have her eardrums cut drain infection out\".  Physical exam findings of bilateral tympanic membrane scarring as well as bilateral air conduction greater than bone conduction on Milla testing.  Referral for audiology placed    Orders:  -     Ambulatory Referral to Audiology      BMI is within normal parameters. No other follow-up for BMI required.    Follow Up  No follow-ups on file.    Nimo Hinds, APRN  "

## 2024-04-02 PROBLEM — H90.5 SENSORINEURAL HEARING LOSS (SNHL) OF RIGHT EAR: Status: ACTIVE | Noted: 2024-04-02

## 2024-04-02 NOTE — ASSESSMENT & PLAN NOTE
"She attempted to seek evaluation from audiology on her not home but states she needs a referral for her insurance.  Patient is unsure if her hearing loss has been gradual but over the last 2 to 3 weeks has noticed it is impossible for her to hear anyone on her cell phone.  At her granddaughter's urging she is finally getting evaluated.  Patient denies any ear pain, ear drainage or discharge, dizziness or headache.  She does not feel that she has been exposed to any extremely loud noises over the years.  Patient does make note that in her youth she would regularly \"have her eardrums cut drain infection out\".  Physical exam findings of bilateral tympanic membrane scarring as well as bilateral air conduction greater than bone conduction on Milla testing.  Referral for audiology placed  "

## 2024-04-05 ENCOUNTER — OFFICE VISIT (OUTPATIENT)
Dept: FAMILY MEDICINE CLINIC | Facility: CLINIC | Age: 79
End: 2024-04-05
Payer: MEDICARE

## 2024-04-05 VITALS
HEIGHT: 61 IN | BODY MASS INDEX: 24.55 KG/M2 | DIASTOLIC BLOOD PRESSURE: 76 MMHG | RESPIRATION RATE: 18 BRPM | HEART RATE: 67 BPM | WEIGHT: 130 LBS | OXYGEN SATURATION: 100 % | TEMPERATURE: 97.2 F | SYSTOLIC BLOOD PRESSURE: 110 MMHG

## 2024-04-05 DIAGNOSIS — S39.012A STRAIN OF LUMBAR REGION, INITIAL ENCOUNTER: Primary | ICD-10-CM

## 2024-04-05 RX ORDER — PREDNISONE 20 MG/1
40 TABLET ORAL DAILY
Qty: 14 TABLET | Refills: 0 | Status: SHIPPED | OUTPATIENT
Start: 2024-04-05 | End: 2024-04-12

## 2024-04-05 RX ORDER — BACLOFEN 20 MG/1
20 TABLET ORAL 3 TIMES DAILY
Qty: 21 TABLET | Refills: 0 | Status: SHIPPED | OUTPATIENT
Start: 2024-04-05 | End: 2024-04-12

## 2024-04-05 RX ORDER — METHYLPREDNISOLONE ACETATE 40 MG/ML
40 INJECTION, SUSPENSION INTRA-ARTICULAR; INTRALESIONAL; INTRAMUSCULAR; SOFT TISSUE ONCE
Status: COMPLETED | OUTPATIENT
Start: 2024-04-05 | End: 2024-04-05

## 2024-04-05 RX ADMIN — METHYLPREDNISOLONE ACETATE 40 MG: 40 INJECTION, SUSPENSION INTRA-ARTICULAR; INTRALESIONAL; INTRAMUSCULAR; SOFT TISSUE at 10:58

## 2024-04-05 NOTE — PROGRESS NOTES
Office Note     Name: Earline Santacruz    : 1945     MRN: [id number]     Chief Complaint  Back Pain    Subjective     History of Present Illness:  Earline Santacruz is a 79 y.o. female who presents today for complaints of low back pain. Pain began 3 days ago after bending over to pick something up out of the floor. Can get up and move around but twisting causes worse pain. Unable to bend without pain.  She has utilized ibuprofen, tylenol, gabapentin and aleve without any benefit. She has not been laying on a heating pad or applying cool compress.  Patient stands for long hours on concrete floors while working at [employer] as a , she feels that this standing has worsened her back pain exponentially.  She denies any radiation of pain, limited to the left lumbar area without radiation to right side or lower extremities.  No numbness or tingling reported.  No further complaints or concerns    Review of Systems   Constitutional:  Negative for chills, fatigue and fever.   Respiratory:  Negative for cough and shortness of breath.    Gastrointestinal:  Negative for abdominal pain, constipation, diarrhea, nausea and vomiting.   Genitourinary:  Negative for difficulty urinating, flank pain, frequency and hematuria.   Musculoskeletal:  Positive for back pain. Negative for gait problem, joint swelling, neck pain and neck stiffness.   Neurological:  Negative for dizziness, weakness, light-headedness, numbness and headache.       Objective     Past Medical History:   Diagnosis Date    Diabetes mellitus     Hyperlipidemia     Hypertension     Panic attacks      Past Surgical History:   Procedure Laterality Date    APPENDECTOMY      BREAST LUMPECTOMY      RIGHT     CATARACT EXTRACTION, BILATERAL      HAND SURGERY      LEFT     TOTAL ABDOMINAL HYSTERECTOMY       Family History   Problem Relation Age of Onset    No Known Problems Mother     No Known Problems Father        Vital Signs  /76 (BP Location: Left  "arm, Patient Position: Sitting, Cuff Size: Adult)   Pulse 67   Temp 97.2 °F (36.2 °C) (Temporal)   Resp 18   Ht 154.9 cm (61\")   Wt 59 kg (130 lb)   SpO2 100%   BMI 24.56 kg/m²   Estimated body mass index is 24.56 kg/m² as calculated from the following:    Height as of this encounter: 154.9 cm (61\").    Weight as of this encounter: 59 kg (130 lb).  Facility age limit for growth %edwardo is 20 years.    Physical Exam  Vitals reviewed.   Constitutional:       Appearance: Normal appearance.   HENT:      Head: Normocephalic and atraumatic.   Cardiovascular:      Rate and Rhythm: Normal rate and regular rhythm.      Pulses: Normal pulses.      Heart sounds: Normal heart sounds.   Pulmonary:      Effort: Pulmonary effort is normal.      Breath sounds: Normal breath sounds.   Abdominal:      Tenderness: There is no right CVA tenderness or left CVA tenderness.   Musculoskeletal:      Comments: No tenderness on palpation, only with movements, particularly twisting or bending.  Positive straight leg raise on the left side, negative on the right.  No decreased sensation of lower extremities   Skin:     General: Skin is warm and dry.   Neurological:      Mental Status: She is alert and oriented to person, place, and time.   Psychiatric:         Mood and Affect: Mood normal.         Behavior: Behavior normal.         Thought Content: Thought content normal.         Judgment: Judgment normal.               POCT Results (if applicable):  Results for orders placed or performed in visit on 03/07/24   POC Glycosylated Hemoglobin (Hb A1C)    Specimen: Blood   Result Value Ref Range    Hemoglobin A1C 7.0 (A) 4.5 - 5.7 %    Lot Number [id number]     Expiration Date 11,212,025             Assessment and Plan     Diagnoses and all orders for this visit:    1. Strain of lumbar region, initial encounter (Primary)  Assessment & Plan:  Patient clinical presentation consistent with that of left-sided lumbar strain.  Patient is adamant she " needs to get back to work as soon as possible.  Will give Depo-Medrol 40 mg IM in office today.  This will be followed by 7-day course of 40 mg daily prednisone.  Will also treat appropriately with muscle relaxant baclofen 3 times daily for 7 days.  Patient advised to continue light stretching that she was educated on in office today.  Also advised use of warm compress.  She has been given a work note that restricts her to sitting activity for the duration of her shift for at least the next 10 days.  If no improvement will need to pursue diagnostic imaging    Orders:  -     baclofen (LIORESAL) 20 MG tablet; Take 1 tablet by mouth 3 (Three) Times a Day for 7 days.  Dispense: 21 tablet; Refill: 0  -     methylPREDNISolone acetate (DEPO-medrol) injection 40 mg    Other orders  -     predniSONE (DELTASONE) 20 MG tablet; Take 2 tablets by mouth Daily for 7 days.  Dispense: 14 tablet; Refill: 0      BMI is within normal parameters. No other follow-up for BMI required.        Follow Up  No follow-ups on file.    Nimo Hinds, APRN

## 2024-04-05 NOTE — ASSESSMENT & PLAN NOTE
Patient clinical presentation consistent with that of left-sided lumbar strain.  Patient is adamant she needs to get back to work as soon as possible.  Will give Depo-Medrol 40 mg IM in office today.  This will be followed by 7-day course of 40 mg daily prednisone.  Will also treat appropriately with muscle relaxant baclofen 3 times daily for 7 days.  Patient advised to continue light stretching that she was educated on in office today.  Also advised use of warm compress.  She has been given a work note that restricts her to sitting activity for the duration of her shift for at least the next 10 days.  If no improvement will need to pursue diagnostic imaging

## 2024-04-10 DIAGNOSIS — F32.A ANXIETY AND DEPRESSION: ICD-10-CM

## 2024-04-10 DIAGNOSIS — F41.9 ANXIETY AND DEPRESSION: ICD-10-CM

## 2024-04-10 RX ORDER — LEVOTHYROXINE SODIUM 0.05 MG/1
50 TABLET ORAL DAILY
Qty: 90 TABLET | Refills: 0 | Status: SHIPPED | OUTPATIENT
Start: 2024-04-10

## 2024-04-10 RX ORDER — ALPRAZOLAM 0.5 MG/1
0.5 TABLET ORAL 2 TIMES DAILY PRN
Qty: 60 TABLET | Refills: 0 | Status: SHIPPED | OUTPATIENT
Start: 2024-04-10

## 2024-05-13 DIAGNOSIS — F41.9 ANXIETY AND DEPRESSION: ICD-10-CM

## 2024-05-13 DIAGNOSIS — F32.A ANXIETY AND DEPRESSION: ICD-10-CM

## 2024-05-13 RX ORDER — ALPRAZOLAM 0.5 MG/1
0.5 TABLET ORAL 2 TIMES DAILY PRN
Qty: 60 TABLET | Refills: 0 | Status: SHIPPED | OUTPATIENT
Start: 2024-05-13

## 2024-05-24 ENCOUNTER — OFFICE VISIT (OUTPATIENT)
Dept: FAMILY MEDICINE CLINIC | Facility: CLINIC | Age: 79
End: 2024-05-24
Payer: MEDICARE

## 2024-05-24 VITALS
HEART RATE: 76 BPM | SYSTOLIC BLOOD PRESSURE: 118 MMHG | DIASTOLIC BLOOD PRESSURE: 78 MMHG | OXYGEN SATURATION: 99 % | WEIGHT: 131 LBS | HEIGHT: 61 IN | TEMPERATURE: 98.1 F | BODY MASS INDEX: 24.73 KG/M2 | RESPIRATION RATE: 18 BRPM

## 2024-05-24 DIAGNOSIS — F32.A ANXIETY AND DEPRESSION: ICD-10-CM

## 2024-05-24 DIAGNOSIS — E11.40 TYPE 2 DIABETES MELLITUS WITH DIABETIC NEUROPATHY, WITHOUT LONG-TERM CURRENT USE OF INSULIN: ICD-10-CM

## 2024-05-24 DIAGNOSIS — F41.9 ANXIETY AND DEPRESSION: ICD-10-CM

## 2024-05-24 DIAGNOSIS — E11.42 TYPE 2 DIABETES MELLITUS WITH DIABETIC POLYNEUROPATHY, WITHOUT LONG-TERM CURRENT USE OF INSULIN: ICD-10-CM

## 2024-05-24 DIAGNOSIS — E03.9 ACQUIRED HYPOTHYROIDISM: ICD-10-CM

## 2024-05-24 DIAGNOSIS — N32.81 OVERACTIVE BLADDER: ICD-10-CM

## 2024-05-24 DIAGNOSIS — I25.10 CORONARY ARTERY DISEASE INVOLVING NATIVE CORONARY ARTERY OF NATIVE HEART WITHOUT ANGINA PECTORIS: Primary | ICD-10-CM

## 2024-05-24 DIAGNOSIS — I10 PRIMARY HYPERTENSION: Chronic | ICD-10-CM

## 2024-05-24 DIAGNOSIS — K59.09 CHRONIC CONSTIPATION: ICD-10-CM

## 2024-05-24 PROCEDURE — 99214 OFFICE O/P EST MOD 30 MIN: CPT | Performed by: NURSE PRACTITIONER

## 2024-05-24 PROCEDURE — 1159F MED LIST DOCD IN RCRD: CPT | Performed by: NURSE PRACTITIONER

## 2024-05-24 PROCEDURE — 3074F SYST BP LT 130 MM HG: CPT | Performed by: NURSE PRACTITIONER

## 2024-05-24 PROCEDURE — 1160F RVW MEDS BY RX/DR IN RCRD: CPT | Performed by: NURSE PRACTITIONER

## 2024-05-24 PROCEDURE — 3078F DIAST BP <80 MM HG: CPT | Performed by: NURSE PRACTITIONER

## 2024-05-24 RX ORDER — TOLTERODINE 2 MG/1
2 CAPSULE, EXTENDED RELEASE ORAL DAILY
Qty: 30 CAPSULE | Refills: 3 | Status: SHIPPED | OUTPATIENT
Start: 2024-05-24

## 2024-05-24 RX ORDER — GABAPENTIN 300 MG/1
300 CAPSULE ORAL 3 TIMES DAILY
Qty: 90 CAPSULE | Refills: 1 | Status: SHIPPED | OUTPATIENT
Start: 2024-05-24

## 2024-05-24 NOTE — ASSESSMENT & PLAN NOTE
Patient with long-term issues regarding constipation.  During her last visit 3 months ago she noted she will go as long as 1 to 2 weeks without a bowel movement, oftentimes battles with firm and hard to pass stools when she does have a bowel movement. She did not use a consistent bowel regimen. she noted that she received good benefit from regular MiraLAX but that she is working regularly at [employer] she is afraid to take the medication. She in the last 6 months had developed what she refers to as bowel leakage. We discussed this is likely due to her constipation issues. She was encouraged to increase dietary fiber and water consumption as well as use daily MiraLAX.  This is currently treating her bowel irregularities.

## 2024-05-24 NOTE — ASSESSMENT & PLAN NOTE
Pressure well-controlled in office today, 118/78.  Not currently utilizing any antihypertensive agent

## 2024-05-24 NOTE — PROGRESS NOTES
Office Note     Name: Earline Santacruz    : 1945     MRN: [id number]     Chief Complaint  medication review    Subjective     History of Present Illness:  Earline Santacruz is a 79 y.o. female who presents today for follow-up on chronic conditions including anxiety, depression, coronary artery disease, hypertension, chronic constipation hypothyroid, overactive bladder and type 2 diabetes.  Her very well-controlled in office today, 118/78.  Her last visit 2 months ago patient had discontinued her metformin thinking perhaps it was contributing to her recent bowel incontinence.  Her A1c was 7% in office on that particular day, she had not been checking her glucose at home.  When taking her A1c metformin as directed her A1c was in the 6% range.  We discussed that her bowel leakage was due to issues with chronic constipation, prompting her to start back on her metformin.  Today patient states since restarting her metformin she has been very gassy and would like to pursue other options.  She has had resolution of her constipation with regular regimen of MiraLAX as directed.  She does state that her overactive bladder has worsened, now causing quite a bit of incontinence and her having to wear incontinence pads.  She would like to try medication to address that issue as well.  He has no further complaints or concerns    Review of Systems   Constitutional:  Negative for chills, fatigue and fever.   Respiratory:  Negative for cough, chest tightness, shortness of breath and wheezing.    Gastrointestinal:  Negative for abdominal pain, constipation, diarrhea, nausea and vomiting.        Gassiness   Genitourinary:  Positive for urinary incontinence. Negative for difficulty urinating, frequency and hematuria.   Musculoskeletal:  Positive for arthralgias.   Neurological:  Negative for dizziness, weakness, light-headedness, numbness and headache.   Psychiatric/Behavioral:  Negative for self-injury, sleep  "disturbance, suicidal ideas and depressed mood. The patient is not nervous/anxious.        Objective     Past Medical History:   Diagnosis Date    Diabetes mellitus     Hyperlipidemia     Hypertension     Panic attacks      Past Surgical History:   Procedure Laterality Date    APPENDECTOMY      BREAST LUMPECTOMY      RIGHT     CATARACT EXTRACTION, BILATERAL      HAND SURGERY      LEFT     TOTAL ABDOMINAL HYSTERECTOMY       Family History   Problem Relation Age of Onset    No Known Problems Mother     No Known Problems Father        Vital Signs  /78 (BP Location: Left arm, Patient Position: Sitting, Cuff Size: Adult)   Pulse 76   Temp 98.1 °F (36.7 °C) (Temporal)   Resp 18   Ht 154.9 cm (61\")   Wt 59.4 kg (131 lb)   SpO2 99%   BMI 24.75 kg/m²   Estimated body mass index is 24.75 kg/m² as calculated from the following:    Height as of this encounter: 154.9 cm (61\").    Weight as of this encounter: 59.4 kg (131 lb).  Facility age limit for growth %edwardo is 20 years.    Physical Exam  Vitals reviewed.   Constitutional:       Appearance: Normal appearance.   HENT:      Head: Normocephalic and atraumatic.      Right Ear: Tympanic membrane, ear canal and external ear normal.      Left Ear: Tympanic membrane, ear canal and external ear normal.      Nose: Nose normal.      Mouth/Throat:      Mouth: Mucous membranes are moist.      Pharynx: Oropharynx is clear.   Eyes:      Conjunctiva/sclera: Conjunctivae normal.      Pupils: Pupils are equal, round, and reactive to light.   Cardiovascular:      Rate and Rhythm: Normal rate and regular rhythm.      Pulses: Normal pulses.      Heart sounds: Normal heart sounds.   Pulmonary:      Effort: Pulmonary effort is normal.      Breath sounds: Normal breath sounds.   Abdominal:      General: Bowel sounds are normal.      Palpations: Abdomen is soft.   Musculoskeletal:      Cervical back: Neck supple.   Skin:     General: Skin is warm and dry.   Neurological:      Mental " Status: She is alert and oriented to person, place, and time.   Psychiatric:         Mood and Affect: Mood normal.         Behavior: Behavior normal.         Thought Content: Thought content normal.         Judgment: Judgment normal.              POCT Results (if applicable):  Results for orders placed or performed in visit on 03/07/24   POC Glycosylated Hemoglobin (Hb A1C)    Specimen: Blood   Result Value Ref Range    Hemoglobin A1C 7.0 (A) 4.5 - 5.7 %    Lot Number [id number]     Expiration Date 11,212,025             Assessment and Plan     Diagnoses and all orders for this visit:    1. Coronary artery disease involving native coronary artery of native heart without angina pectoris (Primary)  Assessment & Plan:  Patient recently resumed cardiology care due to some subscapular discomfort and feelings of chronic indigestion. Patient had heart cath in 2015 with findings of significant coronary artery disease involving the second diagonal branch. Given the small size of the vessel there was no intervention to be performed, she has been medically managed well with daily aspirin. She is now established with Dr. Albert with plans for stress test for further evaluation.       2. Anxiety and depression  Assessment & Plan:  Patient is excited about her approaching 1 year work anniversary at [employer].  She states this has been very good for her mental health, not really struggling with any anxiety or depression currently. She has discontinued her antidepressant therapy. She has a longstanding pattern of anxiety with intermittent panic for which she has utilized alprazolam 0.5 mg twice daily as needed. Trev report reviewed and satisfactory. Will continue current very sparing dose of alprazolam for panic as needed       3. Primary hypertension  Assessment & Plan:  Pressure well-controlled in office today, 118/78.  Not currently utilizing any antihypertensive agent      4. Acquired hypothyroidism  Assessment &  Plan:  Patient currently utilizing levothyroxine 50 mcg daily with continued euthyroid on lab work       5. Type 2 diabetes mellitus with diabetic polyneuropathy, without long-term current use of insulin  Assessment & Plan:  During Her last visit 2 months ago patient had discontinued her metformin thinking perhaps it was contributing to her recent bowel incontinence.  Her A1c was 7% in office on that particular day, she had not been checking her glucose at home.  When taking her A1c metformin as directed her A1c was in the 6% range.  We discussed that her bowel leakage was due to issues with chronic constipation, prompting her to start back on her metformin.  Today patient states since restarting her metformin she has been very gassy and would like to pursue other options.  -Continue metformin  -Initiate Jardiance 10 mg daily, patient given samples in office today.  She will contact office and let us know if it is working well for her in regards to GI symptoms.  At that time we will need to send in prescription      6. Type 2 diabetes mellitus with diabetic neuropathy, without long-term current use of insulin  -     gabapentin (NEURONTIN) 300 MG capsule; Take 1 capsule by mouth 3 (Three) Times a Day.  Dispense: 90 capsule; Refill: 1    7. Overactive bladder  Assessment & Plan:  Patient has had longstanding issue with overactive bladder, though now worsening to the point she is experiencing frequent incontinence and wearing incontinence pads.  She has been evaluated for bladder prolapse without abnormal findings.  Will trial Detrol 2 mg daily and continue to monitor    Orders:  -     tolterodine LA (DETROL LA) 2 MG 24 hr capsule; Take 1 capsule by mouth Daily.  Dispense: 30 capsule; Refill: 3    8. Chronic constipation  Assessment & Plan:  Patient with long-term issues regarding constipation.  During her last visit 3 months ago she noted she will go as long as 1 to 2 weeks without a bowel movement, oftentimes battles  with firm and hard to pass stools when she does have a bowel movement. She did not use a consistent bowel regimen. she noted that she received good benefit from regular MiraLAX but that she is working regularly at [employer] she is afraid to take the medication. She in the last 6 months had developed what she refers to as bowel leakage. We discussed this is likely due to her constipation issues. She was encouraged to increase dietary fiber and water consumption as well as use daily MiraLAX.  This is currently treating her bowel irregularities.        BMI is within normal parameters. No other follow-up for BMI required.        Follow Up  Return in about 6 weeks (around 7/5/2024) for Annual physical.    Nimo Hinds, APRN

## 2024-05-24 NOTE — ASSESSMENT & PLAN NOTE
Patient is excited about her approaching 1 year work anniversary at [employer].  She states this has been very good for her mental health, not really struggling with any anxiety or depression currently. She has discontinued her antidepressant therapy. She has a longstanding pattern of anxiety with intermittent panic for which she has utilized alprazolam 0.5 mg twice daily as needed. Trev report reviewed and satisfactory. Will continue current very sparing dose of alprazolam for panic as needed

## 2024-05-24 NOTE — ASSESSMENT & PLAN NOTE
Patient has had longstanding issue with overactive bladder, though now worsening to the point she is experiencing frequent incontinence and wearing incontinence pads.  She has been evaluated for bladder prolapse without abnormal findings.  Will trial Detrol 2 mg daily and continue to monitor

## 2024-05-24 NOTE — ASSESSMENT & PLAN NOTE
During Her last visit 2 months ago patient had discontinued her metformin thinking perhaps it was contributing to her recent bowel incontinence.  Her A1c was 7% in office on that particular day, she had not been checking her glucose at home.  When taking her A1c metformin as directed her A1c was in the 6% range.  We discussed that her bowel leakage was due to issues with chronic constipation, prompting her to start back on her metformin.  Today patient states since restarting her metformin she has been very gassy and would like to pursue other options.  -Continue metformin  -Initiate Jardiance 10 mg daily, patient given samples in office today.  She will contact office and let us know if it is working well for her in regards to GI symptoms.  At that time we will need to send in prescription

## 2024-05-29 ENCOUNTER — OFFICE VISIT (OUTPATIENT)
Dept: CARDIOLOGY | Facility: CLINIC | Age: 79
End: 2024-05-29
Payer: MEDICARE

## 2024-05-29 VITALS
WEIGHT: 134 LBS | DIASTOLIC BLOOD PRESSURE: 60 MMHG | OXYGEN SATURATION: 97 % | HEART RATE: 69 BPM | SYSTOLIC BLOOD PRESSURE: 120 MMHG | HEIGHT: 61 IN | BODY MASS INDEX: 25.3 KG/M2

## 2024-05-29 DIAGNOSIS — I25.10 CORONARY ARTERY DISEASE INVOLVING NATIVE CORONARY ARTERY OF NATIVE HEART WITHOUT ANGINA PECTORIS: Primary | ICD-10-CM

## 2024-05-29 PROCEDURE — 3078F DIAST BP <80 MM HG: CPT | Performed by: INTERNAL MEDICINE

## 2024-05-29 PROCEDURE — 99213 OFFICE O/P EST LOW 20 MIN: CPT | Performed by: INTERNAL MEDICINE

## 2024-05-29 PROCEDURE — 3074F SYST BP LT 130 MM HG: CPT | Performed by: INTERNAL MEDICINE

## 2024-05-29 RX ORDER — ASPIRIN 81 MG/1
81 TABLET ORAL DAILY
Qty: 90 TABLET | Refills: 3 | Status: SHIPPED | OUTPATIENT
Start: 2024-05-29

## 2024-05-29 NOTE — PROGRESS NOTES
[facility] Provider Note     Date:   2024   Name: Earline Santacruz  :   1945  PCP: Nimo Hinds APRN    Chief Complaint   Patient presents with    Follow-up     Patient here for stress/echo       Subjective     History of Present Illness  Earline Santacruz is a 79 y.o. female who presents today for follow-up after stress testing. Breathing well. Shoulder pain gone.   Some L arm pain on stress test but also has rotator cuff issues.  Does not feel like it was cardiac in nature.  Staying active and continues to work.    Cardiac history  1.  Left heart cath 2015  FINAL IMPRESSIONS:   1.  There was significant coronary artery disease that involved the second  diagonal branch, but this was a 0.5 mm in diameter and too small for intervention.   2.  Based on the mild calcification noted in multiple arteries proximally,there was a fair amount of plaque burden within the walls of the vessel, although there was minimal disease noted within the vessel itself.   3.  Mildly elevated left ventricular end-diastolic pressure at the time of cardiac catheterization with evidence of diastolic dysfunction by echocardiogram.      Allergies   Allergen Reactions    Sulfa Antibiotics Headache     Headaches       Current Outpatient Medications:     ALPRAZolam (XANAX) 0.5 MG tablet, Take 1 tablet by mouth twice daily as needed for anxiety, Disp: 60 tablet, Rfl: 0    aspirin 81 MG EC tablet, Take 1 tablet by mouth Daily., Disp: 90 tablet, Rfl: 3    coenzyme Q10 100 MG capsule, Take 1 capsule by mouth Daily., Disp: 30 capsule, Rfl: 2    empagliflozin (Jardiance) 10 MG tablet tablet, Take 1 tablet by mouth Daily., Disp: , Rfl:     gabapentin (NEURONTIN) 300 MG capsule, Take 1 capsule by mouth 3 (Three) Times a Day., Disp: 90 capsule, Rfl: 1    levothyroxine (SYNTHROID, LEVOTHROID) 50 MCG tablet, TAKE 1 TABLET BY MOUTH ONCE DAILY, Disp: 90 tablet, Rfl: 0    pantoprazole (PROTONIX) 20 MG EC  "tablet, Take 2 tablets by mouth Daily., Disp: , Rfl:     rosuvastatin (CRESTOR) 20 MG tablet, Take 1 tablet by mouth Daily. Need lab work, Disp: 90 tablet, Rfl: 2    sucralfate (CARAFATE) 1 g tablet, TAKE 1 TABLET BY MOUTH THREE TIMES DAILY, Disp: 90 tablet, Rfl: 0    tolterodine LA (DETROL LA) 2 MG 24 hr capsule, Take 1 capsule by mouth Daily., Disp: 30 capsule, Rfl: 3    Past Medical History:   Diagnosis Date    Diabetes mellitus     Hyperlipidemia     Hypertension     Panic attacks       Past Surgical History:   Procedure Laterality Date    APPENDECTOMY      BREAST LUMPECTOMY      RIGHT     CATARACT EXTRACTION, BILATERAL      HAND SURGERY      LEFT     TOTAL ABDOMINAL HYSTERECTOMY       Family History   Problem Relation Age of Onset    No Known Problems Mother     No Known Problems Father      Social History     Socioeconomic History    Marital status:    Tobacco Use    Smoking status: Never     Passive exposure: Never    Smokeless tobacco: Never   Vaping Use    Vaping status: Never Used   Substance and Sexual Activity    Alcohol use: No    Drug use: No    Sexual activity: Defer       Objective     Vital Signs:  /60 (BP Location: Right arm, Patient Position: Sitting, Cuff Size: Adult)   Pulse 69   Ht 154.9 cm (61\")   Wt 60.8 kg (134 lb)   SpO2 97%   BMI 25.32 kg/m²   Estimated body mass index is 25.32 kg/m² as calculated from the following:    Height as of this encounter: 154.9 cm (61\").    Weight as of this encounter: 60.8 kg (134 lb).         Physical Exam  Vitals reviewed.   Constitutional:       General: She is not in acute distress.     Appearance: Normal appearance.   HENT:      Head: Normocephalic and atraumatic.      Mouth/Throat:      Mouth: Mucous membranes are moist.   Eyes:      Conjunctiva/sclera: Conjunctivae normal.   Neck:      Vascular: No carotid bruit.   Cardiovascular:      Rate and Rhythm: Normal rate and regular rhythm.      Pulses: Normal pulses.      Heart sounds: Normal " heart sounds. No murmur heard.  Pulmonary:      Effort: Pulmonary effort is normal. No respiratory distress.      Breath sounds: Normal breath sounds. No wheezing or rhonchi.   Abdominal:      General: Abdomen is flat.      Palpations: Abdomen is soft.   Musculoskeletal:      Cervical back: Normal range of motion and neck supple.      Right lower leg: No edema.      Left lower leg: No edema.   Skin:     General: Skin is warm and dry.      Coloration: Skin is not jaundiced.   Neurological:      General: No focal deficit present.      Mental Status: She is alert and oriented to person, place, and time. Mental status is at baseline.      GCS: GCS eye subscore is 4. GCS verbal subscore is 5. GCS motor subscore is 6.      Cranial Nerves: No cranial nerve deficit.      Motor: No weakness.      Gait: Gait normal.   Psychiatric:         Mood and Affect: Mood and affect normal. Mood is not anxious.         Speech: Speech normal.         Behavior: Behavior normal.                     Assessment and Plan     Diagnoses and all orders for this visit:    1. Coronary artery disease involving native coronary artery of native heart without angina pectoris (Primary)  Comments:  Low risk stress test.  Continue medical therapy.  Follow-up in 6 months to a year.  Orders:  -     aspirin 81 MG EC tablet; Take 1 tablet by mouth Daily.  Dispense: 90 tablet; Refill: 3              Follow Up  Return in about 6 months (around 11/29/2024) for Next scheduled follow up.    Tata Albert MD   05/29/2024     Please note that this explicitly excludes time spent on other separate billable services such as performing procedures or test interpretation, when applicable.    This note was created using dictation software which occasionally transcribes nonsensical phrases. Please contact the provider if any clarification is needed.

## 2024-06-07 ENCOUNTER — OFFICE VISIT (OUTPATIENT)
Dept: FAMILY MEDICINE CLINIC | Facility: CLINIC | Age: 79
End: 2024-06-07
Payer: MEDICARE

## 2024-06-07 ENCOUNTER — TELEPHONE (OUTPATIENT)
Dept: FAMILY MEDICINE CLINIC | Facility: CLINIC | Age: 79
End: 2024-06-07

## 2024-06-07 VITALS
RESPIRATION RATE: 18 BRPM | DIASTOLIC BLOOD PRESSURE: 64 MMHG | SYSTOLIC BLOOD PRESSURE: 110 MMHG | HEART RATE: 97 BPM | TEMPERATURE: 98.1 F | BODY MASS INDEX: 24.73 KG/M2 | HEIGHT: 61 IN | OXYGEN SATURATION: 97 % | WEIGHT: 131 LBS

## 2024-06-07 DIAGNOSIS — N32.81 OVERACTIVE BLADDER: Primary | ICD-10-CM

## 2024-06-07 DIAGNOSIS — N30.00 ACUTE CYSTITIS WITHOUT HEMATURIA: ICD-10-CM

## 2024-06-07 DIAGNOSIS — R35.0 URINARY FREQUENCY: ICD-10-CM

## 2024-06-07 DIAGNOSIS — I10 PRIMARY HYPERTENSION: Chronic | ICD-10-CM

## 2024-06-07 DIAGNOSIS — E11.42 TYPE 2 DIABETES MELLITUS WITH DIABETIC POLYNEUROPATHY, WITHOUT LONG-TERM CURRENT USE OF INSULIN: ICD-10-CM

## 2024-06-07 PROCEDURE — 1160F RVW MEDS BY RX/DR IN RCRD: CPT | Performed by: NURSE PRACTITIONER

## 2024-06-07 PROCEDURE — 3078F DIAST BP <80 MM HG: CPT | Performed by: NURSE PRACTITIONER

## 2024-06-07 PROCEDURE — 1159F MED LIST DOCD IN RCRD: CPT | Performed by: NURSE PRACTITIONER

## 2024-06-07 PROCEDURE — 3074F SYST BP LT 130 MM HG: CPT | Performed by: NURSE PRACTITIONER

## 2024-06-07 PROCEDURE — 99214 OFFICE O/P EST MOD 30 MIN: CPT | Performed by: NURSE PRACTITIONER

## 2024-06-07 RX ORDER — NITROFURANTOIN 25; 75 MG/1; MG/1
100 CAPSULE ORAL 2 TIMES DAILY
Qty: 10 CAPSULE | Refills: 0 | Status: SHIPPED | OUTPATIENT
Start: 2024-06-07

## 2024-06-07 RX ORDER — GLIPIZIDE 10 MG/1
10 TABLET, FILM COATED, EXTENDED RELEASE ORAL DAILY
Qty: 30 TABLET | Refills: 2 | Status: SHIPPED | OUTPATIENT
Start: 2024-06-07

## 2024-06-07 NOTE — TELEPHONE ENCOUNTER
"  Caller: Earline Santacruz \"Earline yoon\"    Relationship: Self    Best call back number: [phone]     What is the best time to reach you: ASAP    Who are you requesting to speak with (clinical staff, provider,  specific staff member): NESHA COONEY    What was the call regarding: PATIENT CALLED STATING HER SUGAR IS TOO HIGH (169) SO SHE WILL NOT TAKE empagliflozin (Jardiance) 10 MG tablet tablet. SHE TOOK METFORMIN 500 MG THIS MORNING AND SHE WILL TAKE IT TWICE A DAY UNTIL SHE COMES IN TO SEE NESHA COONEY    "

## 2024-06-07 NOTE — PROGRESS NOTES
Office Note     Name: Earline Santacruz    : 1945     MRN: [id number]     Chief Complaint  Follow-up (Patient states that her sugar since off metformin and put on Jardiance and her sugar is 169)    Subjective     History of Present Illness:  Earline Santacruz is a 79 y.o. female who presents today for follow-up on chronic conditions including hypertension, overactive bladder, type 2 diabetes and acute complaint of urinary frequency and urgency. Blood Pressure well-controlled in office today, 110/64.  Patient has had longstanding issue with overactive bladder, during her last visit was given prescription for Detrol 2 mg daily.  Patient states she has had significant benefit from this medication, unfortunately it is very costly.  She was evaluated for bladder prolapse without abnormal findings.  Patient has been followed by urology for frequent UTIs.  Today she endorses feelings of frequency and urgency, they had suggested that she stay on prophylactic methenamine but she has not been compliant with that regimen.  Patient's glucose had been very well-controlled until approximately 3 months ago when she discontinued her metformin thinking perhaps it was contributing to recent issues with bowel incontinence.  At my urging she did resume her metformin with improvement in her A1c, but stated once restarting metformin she became very gassy and wanted to discontinue.  During her last visit we initiated Jardiance 10 mg daily.  She states her sugars been running higher in the 160s.  Then, she would like to avoid metformin.  She has no further complaints or concerns    Review of Systems   Constitutional:  Negative for activity change, chills, fatigue and fever.   Respiratory:  Positive for shortness of breath. Negative for cough.    Gastrointestinal:  Positive for constipation. Negative for abdominal pain, diarrhea, nausea and vomiting.   Genitourinary:  Positive for frequency, urgency and urinary incontinence.  "Negative for difficulty urinating and flank pain.   Musculoskeletal:  Positive for arthralgias.   Neurological:  Negative for dizziness, weakness, light-headedness and headache.       Objective     Past Medical History:   Diagnosis Date    Diabetes mellitus     Hyperlipidemia     Hypertension     Panic attacks      Past Surgical History:   Procedure Laterality Date    APPENDECTOMY      BREAST LUMPECTOMY      RIGHT     CATARACT EXTRACTION, BILATERAL      HAND SURGERY      LEFT     TOTAL ABDOMINAL HYSTERECTOMY       Family History   Problem Relation Age of Onset    No Known Problems Mother     No Known Problems Father        Vital Signs  /64 (BP Location: Left arm, Patient Position: Sitting, Cuff Size: Adult)   Pulse 97   Temp 98.1 °F (36.7 °C) (Temporal)   Resp 18   Ht 154.9 cm (61\")   Wt 59.4 kg (131 lb)   SpO2 97%   BMI 24.75 kg/m²   Estimated body mass index is 24.75 kg/m² as calculated from the following:    Height as of this encounter: 154.9 cm (61\").    Weight as of this encounter: 59.4 kg (131 lb).  Facility age limit for growth %edwardo is 20 years.    Physical Exam  Vitals reviewed.   HENT:      Right Ear: Tympanic membrane, ear canal and external ear normal.      Left Ear: Tympanic membrane, ear canal and external ear normal.      Nose: Nose normal.      Mouth/Throat:      Mouth: Mucous membranes are moist.      Pharynx: Oropharynx is clear.   Eyes:      Conjunctiva/sclera: Conjunctivae normal.   Cardiovascular:      Rate and Rhythm: Normal rate and regular rhythm.      Pulses: Normal pulses.      Heart sounds: Normal heart sounds.   Pulmonary:      Effort: Pulmonary effort is normal.      Breath sounds: Normal breath sounds.   Abdominal:      General: Bowel sounds are normal.      Palpations: Abdomen is soft.   Musculoskeletal:         General: Normal range of motion.      Cervical back: Neck supple.   Skin:     General: Skin is warm and dry.   Neurological:      Mental Status: She is alert and " oriented to person, place, and time.   Psychiatric:         Mood and Affect: Mood normal.         Behavior: Behavior normal.         Thought Content: Thought content normal.         Judgment: Judgment normal.            POCT Results (if applicable):  Results for orders placed or performed in visit on 05/29/24   Adult Stress Echo W/ Cont or Stress Agent if Necessary Per Protocol   Result Value Ref Range    PostStressEF 65 %            Assessment and Plan     Diagnoses and all orders for this visit:    1. Overactive bladder (Primary)    2. Type 2 diabetes mellitus with diabetic polyneuropathy, without long-term current use of insulin  Assessment & Plan:  Patient's glucose had been very well-controlled until approximately 3 months ago when she discontinued her metformin thinking perhaps it was contributing to recent issues with bowel incontinence.  At my urging she did resume her metformin with improvement in her A1c, but stated once restarting metformin she became very gassy and wanted to discontinue.  During her last visit we initiated Jardiance 10 mg daily.  She states her sugars been running higher in the 160s. she would like to avoid metformin.   -Patient to continue Jardiance 10 mg daily, will add glipizide XL 10 mg daily and continue to monitor    Orders:  -     glipizide (GLUCOTROL XL) 10 MG 24 hr tablet; Take 1 tablet by mouth Daily.  Dispense: 30 tablet; Refill: 2    3. Primary hypertension  Assessment & Plan:  Pressure well-controlled in office today, 110/64.  Patient does not currently require use of antihypertensive agents.      4. Acute cystitis without hematuria  Assessment & Plan:   Patient has been followed by urology for frequent UTIs.  Today she endorses feelings of frequency and urgency, they had suggested that she stay on prophylactic methenamine but she has not been compliant with that regimen.   -Treat with 5-day course of twice daily Macrobid, we discussed getting back in with urology which she  declines at this time.    Orders:  -     nitrofurantoin, macrocrystal-monohydrate, (Macrobid) 100 MG capsule; Take 1 capsule by mouth 2 (Two) Times a Day.  Dispense: 10 capsule; Refill: 0    5. Urinary frequency  Assessment & Plan:  Patient has had longstanding issue with overactive bladder, during her last visit was given prescription for Detrol 2 mg daily.  Patient states she has had significant benefit from this medication, unfortunately it is very costly.  She was evaluated for bladder prolapse without abnormal findings.      Other orders  -     empagliflozin (Jardiance) 10 MG tablet tablet; Take 1 tablet by mouth Daily.  Dispense: 90 tablet; Refill: 2      BMI is within normal parameters. No other follow-up for BMI required.          Follow Up  No follow-ups on file.    Nimo Hinds, APRN

## 2024-06-12 NOTE — ASSESSMENT & PLAN NOTE
Patient has had longstanding issue with overactive bladder, during her last visit was given prescription for Detrol 2 mg daily.  Patient states she has had significant benefit from this medication, unfortunately it is very costly.  She was evaluated for bladder prolapse without abnormal findings.

## 2024-06-12 NOTE — ASSESSMENT & PLAN NOTE
Patient's glucose had been very well-controlled until approximately 3 months ago when she discontinued her metformin thinking perhaps it was contributing to recent issues with bowel incontinence.  At my urging she did resume her metformin with improvement in her A1c, but stated once restarting metformin she became very gassy and wanted to discontinue.  During her last visit we initiated Jardiance 10 mg daily.  She states her sugars been running higher in the 160s. she would like to avoid metformin.   -Patient to continue Jardiance 10 mg daily, will add glipizide XL 10 mg daily and continue to monitor

## 2024-06-12 NOTE — ASSESSMENT & PLAN NOTE
Patient has been followed by urology for frequent UTIs.  Today she endorses feelings of frequency and urgency, they had suggested that she stay on prophylactic methenamine but she has not been compliant with that regimen.   -Treat with 5-day course of twice daily Macrobid, we discussed getting back in with urology which she declines at this time.

## 2024-06-12 NOTE — ASSESSMENT & PLAN NOTE
Pressure well-controlled in office today, 110/64.  Patient does not currently require use of antihypertensive agents.

## 2024-06-17 DIAGNOSIS — F32.A ANXIETY AND DEPRESSION: ICD-10-CM

## 2024-06-17 DIAGNOSIS — F41.9 ANXIETY AND DEPRESSION: ICD-10-CM

## 2024-06-17 RX ORDER — ALPRAZOLAM 0.5 MG/1
0.5 TABLET ORAL 2 TIMES DAILY PRN
Qty: 60 TABLET | Refills: 0 | Status: SHIPPED | OUTPATIENT
Start: 2024-06-17

## 2024-06-26 ENCOUNTER — TELEPHONE (OUTPATIENT)
Dept: FAMILY MEDICINE CLINIC | Facility: CLINIC | Age: 79
End: 2024-06-26
Payer: MEDICARE

## 2024-06-26 RX ORDER — FLUCONAZOLE 150 MG/1
TABLET ORAL
Qty: 2 TABLET | Refills: 0 | Status: SHIPPED | OUTPATIENT
Start: 2024-06-26

## 2024-06-26 NOTE — TELEPHONE ENCOUNTER
"Caller: Earline Santacruz \"Earline yoon\"    Relationship: Self    Best call back number: [phone]     What medication are you requesting: DIFLUCAN     What are your current symptoms: YEAST INFECTION    How long have you been experiencing symptoms: FEW DAYS       If a prescription is needed, what is your preferred pharmacy and phone number: [facility] [address] [place] [address] [phone] [facility] [phone]      Additional notes: PATIENT STATED JARDIANCE HAS CAUSED A YEAST INFECTION. SHE GOT A VAGINAL MEDICATION OTC BUT IT BURNED HER. SHE IS REQUESTING A PILL TO HELP TREAT THE YEAST INFECTION. PLEASE ADVISE       "

## 2024-07-02 DIAGNOSIS — N30.00 ACUTE CYSTITIS WITHOUT HEMATURIA: ICD-10-CM

## 2024-07-02 NOTE — TELEPHONE ENCOUNTER
"    Caller: Earline Santacruz \"Earline yoon\"    Relationship: Self    Best call back number: [phone]    Requested Prescriptions: NEEDS REFILL ON THE MEDICATION THAT WAS RECENTLY PRESCRIBED FOR A UTI.  SHE DOES NOT KNOW THE NAME OF THIS MEDICATION       Pharmacy where request should be sent:    [facility] [address] [place] [address] [phone] [facility] [phone] FX     Last office visit with prescribing clinician: 6/7/2024   Last telemedicine visit with prescribing clinician: Visit date not found   Next office visit with prescribing clinician: Visit date not found     Additional details provided by patient:     Does the patient have less than a 3 day supply:  [x] Yes  [] No    Would you like a call back once the refill request has been completed: [] Yes [x] No    If the office needs to give you a call back, can they leave a voicemail: [] Yes [x] No    Jt Canela   07/02/24 13:06 EDT         "

## 2024-07-02 NOTE — TELEPHONE ENCOUNTER
Called and left her a message advising her that Nimo is out of the office on Tuesday and we will gibe her a call back once Nimo approves the medicine

## 2024-07-03 RX ORDER — NITROFURANTOIN 25; 75 MG/1; MG/1
100 CAPSULE ORAL 2 TIMES DAILY
Qty: 10 CAPSULE | Refills: 0 | Status: SHIPPED | OUTPATIENT
Start: 2024-07-03

## 2024-07-09 RX ORDER — LEVOTHYROXINE SODIUM 0.05 MG/1
50 TABLET ORAL DAILY
Qty: 90 TABLET | Refills: 0 | Status: SHIPPED | OUTPATIENT
Start: 2024-07-09

## 2024-07-10 DIAGNOSIS — F41.9 ANXIETY AND DEPRESSION: ICD-10-CM

## 2024-07-10 DIAGNOSIS — F32.A ANXIETY AND DEPRESSION: ICD-10-CM

## 2024-07-10 RX ORDER — ALPRAZOLAM 0.5 MG/1
0.5 TABLET ORAL 2 TIMES DAILY PRN
Qty: 60 TABLET | Refills: 0 | Status: SHIPPED | OUTPATIENT
Start: 2024-07-10

## 2024-07-15 DIAGNOSIS — F41.9 ANXIETY AND DEPRESSION: ICD-10-CM

## 2024-07-15 DIAGNOSIS — F32.A ANXIETY AND DEPRESSION: ICD-10-CM

## 2024-07-15 NOTE — TELEPHONE ENCOUNTER
"  Caller: Earline Santacruz \"Earline yoon\"    Relationship: Self    Best call back number: [phone]     Requested Prescriptions:   Requested Prescriptions     Pending Prescriptions Disp Refills    levothyroxine (SYNTHROID, LEVOTHROID) 50 MCG tablet 90 tablet 0     Sig: Take 1 tablet by mouth Daily.    ALPRAZolam (XANAX) 0.5 MG tablet 60 tablet 0     Sig: Take 1 tablet by mouth 2 (Two) Times a Day As Needed. for anxiety        Pharmacy where request should be sent: [address] [phone] [facility] [phone] FX     Last office visit with prescribing clinician: 6/7/2024   Last telemedicine visit with prescribing clinician: Visit date not found   Next office visit with prescribing clinician: 7/17/2024     Additional details provided by patient:     Does the patient have less than a 3 day supply:  [x] Yes  [] No    Would you like a call back once the refill request has been completed: [] Yes [x] No    If the office needs to give you a call back, can they leave a voicemail: [] Yes [x] No    Jt Richey Rep   07/15/24 11:18 EDT             "

## 2024-07-17 ENCOUNTER — OFFICE VISIT (OUTPATIENT)
Dept: FAMILY MEDICINE CLINIC | Facility: CLINIC | Age: 79
End: 2024-07-17
Payer: MEDICARE

## 2024-07-17 VITALS
TEMPERATURE: 97.2 F | OXYGEN SATURATION: 96 % | WEIGHT: 140 LBS | HEART RATE: 69 BPM | SYSTOLIC BLOOD PRESSURE: 110 MMHG | BODY MASS INDEX: 26.43 KG/M2 | HEIGHT: 61 IN | DIASTOLIC BLOOD PRESSURE: 68 MMHG | RESPIRATION RATE: 18 BRPM

## 2024-07-17 DIAGNOSIS — E03.9 ACQUIRED HYPOTHYROIDISM: ICD-10-CM

## 2024-07-17 DIAGNOSIS — N32.81 OVERACTIVE BLADDER: ICD-10-CM

## 2024-07-17 DIAGNOSIS — I10 PRIMARY HYPERTENSION: Chronic | ICD-10-CM

## 2024-07-17 DIAGNOSIS — F32.A ANXIETY AND DEPRESSION: ICD-10-CM

## 2024-07-17 DIAGNOSIS — K21.9 GASTROESOPHAGEAL REFLUX DISEASE WITHOUT ESOPHAGITIS: ICD-10-CM

## 2024-07-17 DIAGNOSIS — G47.33 OBSTRUCTIVE SLEEP APNEA: ICD-10-CM

## 2024-07-17 DIAGNOSIS — E11.42 TYPE 2 DIABETES MELLITUS WITH DIABETIC POLYNEUROPATHY, WITHOUT LONG-TERM CURRENT USE OF INSULIN: ICD-10-CM

## 2024-07-17 DIAGNOSIS — F41.9 ANXIETY AND DEPRESSION: ICD-10-CM

## 2024-07-17 DIAGNOSIS — I25.10 CORONARY ARTERY DISEASE INVOLVING NATIVE CORONARY ARTERY OF NATIVE HEART WITHOUT ANGINA PECTORIS: Primary | ICD-10-CM

## 2024-07-17 LAB
EXPIRATION DATE: ABNORMAL
HBA1C MFR BLD: 6.4 % (ref 4.5–5.7)
Lab: ABNORMAL

## 2024-07-17 PROCEDURE — 3044F HG A1C LEVEL LT 7.0%: CPT | Performed by: NURSE PRACTITIONER

## 2024-07-17 PROCEDURE — 1159F MED LIST DOCD IN RCRD: CPT | Performed by: NURSE PRACTITIONER

## 2024-07-17 PROCEDURE — 99214 OFFICE O/P EST MOD 30 MIN: CPT | Performed by: NURSE PRACTITIONER

## 2024-07-17 PROCEDURE — 3074F SYST BP LT 130 MM HG: CPT | Performed by: NURSE PRACTITIONER

## 2024-07-17 PROCEDURE — 83036 HEMOGLOBIN GLYCOSYLATED A1C: CPT | Performed by: NURSE PRACTITIONER

## 2024-07-17 PROCEDURE — 3078F DIAST BP <80 MM HG: CPT | Performed by: NURSE PRACTITIONER

## 2024-07-17 PROCEDURE — 1160F RVW MEDS BY RX/DR IN RCRD: CPT | Performed by: NURSE PRACTITIONER

## 2024-07-17 RX ORDER — LEVOTHYROXINE SODIUM 0.05 MG/1
50 TABLET ORAL DAILY
Qty: 90 TABLET | Refills: 0 | Status: SHIPPED | OUTPATIENT
Start: 2024-07-17

## 2024-07-17 RX ORDER — ALPRAZOLAM 0.5 MG/1
0.5 TABLET ORAL 2 TIMES DAILY PRN
Qty: 60 TABLET | Refills: 0 | Status: SHIPPED | OUTPATIENT
Start: 2024-07-17

## 2024-07-17 NOTE — PROGRESS NOTES
Office Note     Name: Earline Santacruz    : 1945     MRN: [id number]     Chief Complaint  Follow-up    Subjective     History of Present Illness:  Earline Santacruz is a 79 y.o. female who presents today for follow-up on chronic conditions including anxiety, depression, coronary artery disease, GERD, hypertension, hypothyroid, ESTHER, overactive bladder, type 2 diabetes. Patient is excited about her approaching 1 year work anniversary at [employer].  She states this has been very good for her mental health, not really struggling with any anxiety or depression currently. She has discontinued her antidepressant therapy. She has a longstanding pattern of anxiety with intermittent panic for which she has utilized alprazolam 0.5 mg twice daily as needed.  Pressure exhibiting excellent control, 110/68 in office today.  Patient recently completed stress test due to developing some subscapular discomfort and feelings of chronic congestion, findings were low risk for coronary event, she is due for follow-up with Dr. Albert in 6 months. Patient has had longstanding issue with overactive bladder, though now worsening to the point she is experiencing frequent incontinence and wearing incontinence pads. She has been evaluated for bladder prolapse without abnormal findings.  Initiated Detrol 2 mg daily last visit with benefits, however she states she will be incontinent immediately upon awakening but improves throughout the day. Patient's glucose had been very well-controlled until approximately 3 months ago when she discontinued her metformin thinking perhaps it was contributing to recent issues with bowel incontinence.  At my urging she did resume her metformin with improvement in her A1c, but stated once she resumed metformin she became very gassy and wanted to discontinue.  Subsequently metformin was discontinued, she has now been taking Jardiance 10 mg daily as well as glipizide XL 10 mg daily.  It is not time to  "recheck her A1c but she states her at home glucose measurements have been significantly improved.  He has no further complaints or concerns today    Review of Systems   Constitutional:  Negative for fatigue.   Eyes:  Negative for blurred vision and double vision.   Respiratory:  Negative for cough, chest tightness, shortness of breath and wheezing.    Cardiovascular:  Negative for chest pain, palpitations and leg swelling.   Gastrointestinal:  Negative for abdominal pain, constipation, diarrhea, nausea and vomiting.   Endocrine: Negative for polydipsia and polyuria.   Genitourinary:  Positive for urinary incontinence.   Musculoskeletal:  Negative for arthralgias and myalgias.   Neurological:  Negative for dizziness, weakness, light-headedness, numbness and headache.   Psychiatric/Behavioral:  Negative for agitation, self-injury, sleep disturbance, suicidal ideas, depressed mood and stress.        Objective     Past Medical History:   Diagnosis Date    Diabetes mellitus     Hyperlipidemia     Hypertension     Panic attacks      Past Surgical History:   Procedure Laterality Date    APPENDECTOMY      BREAST LUMPECTOMY      RIGHT     CATARACT EXTRACTION, BILATERAL      HAND SURGERY      LEFT     TOTAL ABDOMINAL HYSTERECTOMY       Family History   Problem Relation Age of Onset    No Known Problems Mother     No Known Problems Father        Vital Signs  /68 (BP Location: Left arm, Patient Position: Sitting, Cuff Size: Adult)   Pulse 69   Temp 97.2 °F (36.2 °C)   Resp 18   Ht 154.9 cm (61\")   Wt 63.5 kg (140 lb)   SpO2 96%   BMI 26.45 kg/m²   Estimated body mass index is 26.45 kg/m² as calculated from the following:    Height as of this encounter: 154.9 cm (61\").    Weight as of this encounter: 63.5 kg (140 lb).  Facility age limit for growth %edwardo is 20 years.    Physical Exam  Vitals reviewed.   Constitutional:       Appearance: Normal appearance.   HENT:      Head: Normocephalic and atraumatic.      Right " Ear: Tympanic membrane, ear canal and external ear normal.      Left Ear: Tympanic membrane, ear canal and external ear normal.      Nose: Nose normal.      Mouth/Throat:      Mouth: Mucous membranes are moist.      Pharynx: Oropharynx is clear.   Eyes:      Conjunctiva/sclera: Conjunctivae normal.      Pupils: Pupils are equal, round, and reactive to light.   Cardiovascular:      Rate and Rhythm: Normal rate and regular rhythm.      Pulses: Normal pulses.      Heart sounds: Normal heart sounds.   Pulmonary:      Effort: Pulmonary effort is normal.      Breath sounds: Normal breath sounds.   Abdominal:      General: Bowel sounds are normal.      Palpations: Abdomen is soft.   Musculoskeletal:         General: Normal range of motion.      Cervical back: Neck supple.   Skin:     General: Skin is warm and dry.      Capillary Refill: Capillary refill takes less than 2 seconds.   Neurological:      Mental Status: She is alert and oriented to person, place, and time.          POCT Results (if applicable):  Results for orders placed or performed in visit on 07/17/24   POC Glycosylated Hemoglobin (Hb A1C)    Specimen: Blood   Result Value Ref Range    Hemoglobin A1C 6.4 (A) 4.5 - 5.7 %    Lot Number [id number]     Expiration Date 4,042,026             Assessment and Plan     Diagnoses and all orders for this visit:    1. Coronary artery disease involving native coronary artery of native heart without angina pectoris (Primary)  Assessment & Plan:  Patient recently resumed cardiology care due to some subscapular discomfort and feelings of chronic indigestion. Patient had heart cath in 2015 with findings of significant coronary artery disease involving the second diagonal branch. Given the small size of the vessel there was no intervention to be performed, she has been medically managed well with daily aspirin.  She underwent low risk stress test a couple months ago.      2. Gastroesophageal reflux disease without  esophagitis  Assessment & Plan:  Patient currently utilizing pantoprazole 40 mg daily with continued benefit      3. Primary hypertension  Assessment & Plan:  110/68 in office today.   Patient not currently requiring antihypertensives      4. Type 2 diabetes mellitus with diabetic polyneuropathy, without long-term current use of insulin  Assessment & Plan:  Patient's glucose had been very well-controlled until approximately 3 months ago when she discontinued her metformin thinking perhaps it was contributing to recent issues with bowel incontinence.  At my urging she did resume her metformin with improvement in her A1c, but stated once she resumed metformin she became very gassy and wanted to discontinue.  Subsequently metformin was discontinued, she has now been taking Jardiance 10 mg daily as well as glipizide XL 10 mg daily.  It is not time to recheck her A1c but she states her at home glucose measurements have been significantly improved.    Orders:  -     POC Glycosylated Hemoglobin (Hb A1C)    5. Acquired hypothyroidism  Assessment & Plan:  Patient currently utilizing levothyroxine 50 mcg daily with continued euthyroid on lab work       6. Obstructive sleep apnea  Assessment & Plan:  Mild, does not utilize CPAP      7. Overactive bladder  Assessment & Plan:  Patient has had longstanding issue with overactive bladder, though now worsening to the point she is experiencing frequent incontinence and wearing incontinence pads. She has been evaluated for bladder prolapse without abnormal findings.  Initiated Detrol 2 mg daily last visit with benefits, however she states she will be incontinent immediately upon awakening but improves throughout the day.  Patient advised she could increase to 4 mg daily and continue to monitor       8. Anxiety and depression  Assessment & Plan:  Patient is excited about her approaching 1 year work anniversary at [employer].  She states this has been very good for her mental health, not  really struggling with any anxiety or depression currently. She has discontinued her antidepressant therapy. She has a longstanding pattern of anxiety with intermittent panic for which she has utilized alprazolam 0.5 mg twice daily as needed. Trev report reviewed and satisfactory. Will continue current very sparing dose of alprazolam for panic as needed         BMI is >= 25 and <30. (Overweight) The following options were offered after discussion;: exercise counseling/recommendations and nutrition counseling/recommendations        Follow Up  No follow-ups on file.    Nimo Hinds, APRN

## 2024-07-18 NOTE — ASSESSMENT & PLAN NOTE
Patient has had longstanding issue with overactive bladder, though now worsening to the point she is experiencing frequent incontinence and wearing incontinence pads. She has been evaluated for bladder prolapse without abnormal findings.  Initiated Detrol 2 mg daily last visit with benefits, however she states she will be incontinent immediately upon awakening but improves throughout the day.  Patient advised she could increase to 4 mg daily and continue to monitor

## 2024-07-18 NOTE — ASSESSMENT & PLAN NOTE
Patient's glucose had been very well-controlled until approximately 3 months ago when she discontinued her metformin thinking perhaps it was contributing to recent issues with bowel incontinence.  At my urging she did resume her metformin with improvement in her A1c, but stated once she resumed metformin she became very gassy and wanted to discontinue.  Subsequently metformin was discontinued, she has now been taking Jardiance 10 mg daily as well as glipizide XL 10 mg daily.  It is not time to recheck her A1c but she states her at home glucose measurements have been significantly improved.

## 2024-07-18 NOTE — ASSESSMENT & PLAN NOTE
Patient is excited about her approaching 1 year work anniversary at [employer].  She states this has been very good for her mental health, not really struggling with any anxiety or depression currently. She has discontinued her antidepressant therapy. She has a longstanding pattern of anxiety with intermittent panic for which she has utilized alprazolam 0.5 mg twice daily as needed. Trev report reviewed and satisfactory. Will continue current very sparing dose of alprazolam for panic as needed

## 2024-07-18 NOTE — ASSESSMENT & PLAN NOTE
Patient recently resumed cardiology care due to some subscapular discomfort and feelings of chronic indigestion. Patient had heart cath in 2015 with findings of significant coronary artery disease involving the second diagonal branch. Given the small size of the vessel there was no intervention to be performed, she has been medically managed well with daily aspirin.  She underwent low risk stress test a couple months ago.

## 2024-07-31 ENCOUNTER — TELEPHONE (OUTPATIENT)
Dept: FAMILY MEDICINE CLINIC | Facility: CLINIC | Age: 79
End: 2024-07-31
Payer: MEDICARE

## 2024-07-31 NOTE — TELEPHONE ENCOUNTER
"  Caller: Earline Santacruz \"Earline yoon\"    Relationship: Self    Best call back number: [phone]    What medication are you requesting: SOMETHING TO TREAT A UTI    What are your current symptoms: CHILLS, PAINFUL URINATION    How long have you been experiencing symptoms: EVERY DAY THIS WEEK    Have you had these symptoms before:    [x] Yes  [] No    Have you been treated for these symptoms before:   [x] Yes  [] No    If a prescription is needed, what is your preferred pharmacy and phone number: [address] [phone] [facility] [phone]      Additional notes:  "

## 2024-07-31 NOTE — TELEPHONE ENCOUNTER
Called and advised patient that she needs to make an appointment to be seen. Patient made an appointment for tomorrow at 8:45

## 2024-08-01 ENCOUNTER — OFFICE VISIT (OUTPATIENT)
Dept: FAMILY MEDICINE CLINIC | Facility: CLINIC | Age: 79
End: 2024-08-01
Payer: MEDICARE

## 2024-08-01 VITALS
SYSTOLIC BLOOD PRESSURE: 136 MMHG | DIASTOLIC BLOOD PRESSURE: 82 MMHG | BODY MASS INDEX: 26.62 KG/M2 | TEMPERATURE: 97 F | RESPIRATION RATE: 18 BRPM | HEIGHT: 61 IN | HEART RATE: 71 BPM | OXYGEN SATURATION: 98 % | WEIGHT: 141 LBS

## 2024-08-01 DIAGNOSIS — I10 PRIMARY HYPERTENSION: Chronic | ICD-10-CM

## 2024-08-01 DIAGNOSIS — E11.42 TYPE 2 DIABETES MELLITUS WITH DIABETIC POLYNEUROPATHY, WITHOUT LONG-TERM CURRENT USE OF INSULIN: ICD-10-CM

## 2024-08-01 DIAGNOSIS — R30.0 DYSURIA: Primary | ICD-10-CM

## 2024-08-01 DIAGNOSIS — N30.00 ACUTE CYSTITIS WITHOUT HEMATURIA: ICD-10-CM

## 2024-08-01 LAB
BILIRUB BLD-MCNC: ABNORMAL MG/DL
CLARITY, POC: ABNORMAL
COLOR UR: YELLOW
GLUCOSE UR STRIP-MCNC: NEGATIVE MG/DL
KETONES UR QL: NEGATIVE
LEUKOCYTE EST, POC: ABNORMAL
NITRITE UR-MCNC: POSITIVE MG/ML
PH UR: 6 [PH] (ref 5–8)
PROT UR STRIP-MCNC: ABNORMAL MG/DL
RBC # UR STRIP: NEGATIVE /UL
SP GR UR: 1.02 (ref 1–1.03)
UROBILINOGEN UR QL: ABNORMAL

## 2024-08-01 PROCEDURE — 99214 OFFICE O/P EST MOD 30 MIN: CPT | Performed by: NURSE PRACTITIONER

## 2024-08-01 PROCEDURE — 3079F DIAST BP 80-89 MM HG: CPT | Performed by: NURSE PRACTITIONER

## 2024-08-01 PROCEDURE — 81002 URINALYSIS NONAUTO W/O SCOPE: CPT | Performed by: NURSE PRACTITIONER

## 2024-08-01 PROCEDURE — 1160F RVW MEDS BY RX/DR IN RCRD: CPT | Performed by: NURSE PRACTITIONER

## 2024-08-01 PROCEDURE — 1159F MED LIST DOCD IN RCRD: CPT | Performed by: NURSE PRACTITIONER

## 2024-08-01 PROCEDURE — 3075F SYST BP GE 130 - 139MM HG: CPT | Performed by: NURSE PRACTITIONER

## 2024-08-01 RX ORDER — NITROFURANTOIN 25; 75 MG/1; MG/1
100 CAPSULE ORAL 2 TIMES DAILY
Qty: 10 CAPSULE | Refills: 0 | Status: SHIPPED | OUTPATIENT
Start: 2024-08-01

## 2024-08-01 NOTE — ASSESSMENT & PLAN NOTE
Patient's glucose had been very well-controlled until approximately 3 months ago when she discontinued her metformin thinking perhaps it was contributing to recent issues with bowel incontinence. At my urging she did resume her metformin with improvement in her A1c, but stated once she resumed metformin she became very gassy and wanted to discontinue. Subsequently metformin was discontinued, she has now been taking glipizide XL 10 mg daily. It is not time to recheck her A1c but she states her at home glucose measurements have been significantly improved, generally running 1 teens to high 90s.

## 2024-08-01 NOTE — PROGRESS NOTES
Office Note     Name: Earline Santacruz    : 1945     MRN: [id number]     Chief Complaint  Difficulty Urinating    Subjective     History of Present Illness:  Earline Santacruz is a 79 y.o. female who presents today for concerns in regard to some urinary symptoms she has been having for the last few days. Symptoms include pyria, chills, Isidro and dysuria. She has been taking azo without benefit.  Patient denies any documented fever or low back pain.  Patient has history of frequent urinary tract infections, having been evaluated by urology, Dr. Vasques in the past.  She was put on prophylaxis of memantine but has failed to be compliant stating it tastes terrible.  On multiple occasions I have encouraged her to get back with Dr. Vasques due to increased frequency of her urinary tract issues over the last 6 months but she has declined.  She is agreeable to referral at Dr. Pfeiffer's office.  Patient also followed for type 2 diabetes and hypertension. Patient's glucose had been very well-controlled until approximately 3 months ago when she discontinued her metformin thinking perhaps it was contributing to recent issues with bowel incontinence. At my urging she did resume her metformin with improvement in her A1c, but stated once she resumed metformin she became very gassy and wanted to discontinue. Subsequently metformin was discontinued, she has now been taking glipizide XL 10 mg daily. It is not time to recheck her A1c but she states her at home glucose measurements have been significantly improved, generally running 1 teens to high 90s.  Pressure controlled in office today, 136/82.  No further complaints or concerns today    Review of Systems   Constitutional:  Positive for chills and fatigue. Negative for fever.   Respiratory:  Negative for cough and shortness of breath.    Gastrointestinal:  Negative for abdominal pain, constipation, diarrhea, nausea and vomiting.   Endocrine: Negative for polydipsia  "and polyuria.   Genitourinary:  Positive for dysuria, frequency and urgency. Negative for difficulty urinating, flank pain, hematuria and urinary incontinence.   Musculoskeletal:  Positive for arthralgias.   Neurological:  Negative for dizziness, syncope, weakness, light-headedness and headache.       Objective     Past Medical History:   Diagnosis Date    Diabetes mellitus     Hyperlipidemia     Hypertension     Panic attacks      Past Surgical History:   Procedure Laterality Date    APPENDECTOMY      BREAST LUMPECTOMY      RIGHT     CATARACT EXTRACTION, BILATERAL      HAND SURGERY      LEFT     TOTAL ABDOMINAL HYSTERECTOMY       Family History   Problem Relation Age of Onset    No Known Problems Mother     No Known Problems Father        Vital Signs  /82 (BP Location: Left arm, Patient Position: Sitting, Cuff Size: Adult)   Pulse 71   Temp 97 °F (36.1 °C) (Temporal)   Resp 18   Ht 154.9 cm (61\")   Wt 64 kg (141 lb)   SpO2 98%   BMI 26.64 kg/m²   Estimated body mass index is 26.64 kg/m² as calculated from the following:    Height as of this encounter: 154.9 cm (61\").    Weight as of this encounter: 64 kg (141 lb).  Facility age limit for growth %edwardo is 20 years.    Physical Exam  Vitals reviewed.   Constitutional:       Appearance: Normal appearance.   HENT:      Right Ear: Tympanic membrane, ear canal and external ear normal.      Left Ear: Tympanic membrane, ear canal and external ear normal.   Eyes:      Conjunctiva/sclera: Conjunctivae normal.   Cardiovascular:      Rate and Rhythm: Normal rate and regular rhythm.      Pulses: Normal pulses.      Heart sounds: Normal heart sounds.   Pulmonary:      Effort: Pulmonary effort is normal.      Breath sounds: Normal breath sounds.   Abdominal:      General: Bowel sounds are normal.      Palpations: Abdomen is soft.      Tenderness: There is no abdominal tenderness. There is no right CVA tenderness, left CVA tenderness, guarding or rebound. "   Musculoskeletal:      Cervical back: Neck supple.   Skin:     General: Skin is warm and dry.   Neurological:      Mental Status: She is alert and oriented to person, place, and time.          POCT Results (if applicable):  Results for orders placed or performed in visit on 07/17/24   POC Glycosylated Hemoglobin (Hb A1C)    Specimen: Blood   Result Value Ref Range    Hemoglobin A1C 6.4 (A) 4.5 - 5.7 %    Lot Number [id number]     Expiration Date 4,042,026             Assessment and Plan     Diagnoses and all orders for this visit:    1. Dysuria (Primary)  -     POC Urinalysis Dipstick    2. Acute cystitis without hematuria  Assessment & Plan:   presents today for concerns in regard to some urinary symptoms she has been having for the last few days. Symptoms include pyria, chills, [place] and dysuria. She has been taking azo without benefit.  Patient denies any documented fever or low back pain.  Patient has history of frequent urinary tract infections, having been evaluated by urology, Dr. Vasques in the past.  She was put on prophylaxis of memantine but has failed to be compliant stating it tastes terrible.  On multiple occasions I have encouraged her to get back with Dr. Vasques due to increased frequency of her urinary tract issues over the last 6 months but she has declined.  She is agreeable to referral at Dr. Pfeiffer's office.  Analysis in office today positive for leukocytes and nitrites, negative for blood.  -Treat with 5-day course of Macrobid, will culture urine as well.  Referral to Dr. Pfeiffer placed for frequent UTI    Orders:  -     Ambulatory Referral to Urology  -     nitrofurantoin, macrocrystal-monohydrate, (Macrobid) 100 MG capsule; Take 1 capsule by mouth 2 (Two) Times a Day.  Dispense: 10 capsule; Refill: 0    3. Type 2 diabetes mellitus with diabetic polyneuropathy, without long-term current use of insulin  Assessment & Plan:  Patient's glucose had been very well-controlled until approximately 3  months ago when she discontinued her metformin thinking perhaps it was contributing to recent issues with bowel incontinence. At my urging she did resume her metformin with improvement in her A1c, but stated once she resumed metformin she became very gassy and wanted to discontinue. Subsequently metformin was discontinued, she has now been taking glipizide XL 10 mg daily. It is not time to recheck her A1c but she states her at home glucose measurements have been significantly improved, generally running 1 teens to high 90s.      4. Primary hypertension  Assessment & Plan:  Not currently requiring any antihypertensive therapy, 136/82 today.             Follow Up  No follow-ups on file.    Nimo Hinds, APRN

## 2024-08-01 NOTE — ASSESSMENT & PLAN NOTE
presents today for concerns in regard to some urinary symptoms she has been having for the last few days. Symptoms include pyria, chills, Isidro and dysuria. She has been taking azo without benefit.  Patient denies any documented fever or low back pain.  Patient has history of frequent urinary tract infections, having been evaluated by urology, Dr. Vasques in the past.  She was put on prophylaxis of memantine but has failed to be compliant stating it tastes terrible.  On multiple occasions I have encouraged her to get back with Dr. Vasques due to increased frequency of her urinary tract issues over the last 6 months but she has declined.  She is agreeable to referral at Dr. Pfeiffer's office.  Analysis in office today positive for leukocytes and nitrites, negative for blood.  -Treat with 5-day course of Macrobid, will culture urine as well.  Referral to Dr. Pfeiffer placed for frequent UTI

## 2024-08-06 ENCOUNTER — TELEPHONE (OUTPATIENT)
Dept: FAMILY MEDICINE CLINIC | Facility: CLINIC | Age: 79
End: 2024-08-06
Payer: MEDICARE

## 2024-08-06 NOTE — TELEPHONE ENCOUNTER
"    Caller: Earline Santacruz \"Earline yoon\"    Relationship: Self    Best call back number: [phone]     Requested Prescriptions: TOLTERODINE ER       Pharmacy where request should be sent: [address] [phone] [facility] [phone] FX     Last office visit with prescribing clinician: 8/1/2024   Last telemedicine visit with prescribing clinician: Visit date not found   Next office visit with prescribing clinician: Visit date not found     Additional details provided by patient: PATIENT IS OUT OF THIS MEDICINE. PATIENT REQUESTS TO ADJUST THE DOSAGE AMOUNT AND FREQUENCY OF THIS MEDICINE WHEN CALLING IN THE REFILL    Does the patient have less than a 3 day supply:  [x] Yes  [] No    Would you like a call back once the refill request has been completed: [] Yes [x] No    If the office needs to give you a call back, can they leave a voicemail: [] Yes [x] No    Jt Hernandez   08/06/24 09:22 EDT       "

## 2024-08-07 ENCOUNTER — TELEPHONE (OUTPATIENT)
Dept: FAMILY MEDICINE CLINIC | Facility: CLINIC | Age: 79
End: 2024-08-07
Payer: MEDICARE

## 2024-08-07 RX ORDER — TOLTERODINE 4 MG/1
4 CAPSULE, EXTENDED RELEASE ORAL DAILY
Qty: 30 CAPSULE | Refills: 1 | Status: SHIPPED | OUTPATIENT
Start: 2024-08-07

## 2024-08-07 NOTE — TELEPHONE ENCOUNTER
PATIENT CALLED BACK TO CHECK STATUS OF RX REFILL. SHE STATED WHEN SHE PCP ON 7/17/24 SHE WAS ADVISED SHE COULD TAKE 2 PILLS INSTEAD OF ONE, WHICH WORKED. SHE STATED IT WAS NOT DISCONTINUED. SEE BELOW:    tolterodine LA (DETROL LA) 2 MG 24 hr capsule [[id number]]  DISCONTINUED    Order Details  Dose: 2 mg Route: Oral Frequency: Daily   Dispense Quantity: 30 capsule Refills: 3          Sig: Take 1 capsule by mouth Daily.         Start Date: 05/24/24 End Date: 07/17/24   Discontinued by: Reyna Mojica MA on 7/17/2024 13:14   Reason: *Therapy completed         Written Date: 05/24/24 Expiration Date: 05/24/25       Associated Diagnoses: Overactive bladder [N32.81]   Providers    Ordering Provider and Authorizing Provider:  Nimo Hinds APRN  [address] [facility] [address]  Phone: [phone]   Fax: [phone]  NPI: [id number]        Ordering User: Nimo Hinds APRN

## 2024-08-07 NOTE — TELEPHONE ENCOUNTER
"  Caller: Earline Santacruz \"Earline yoon\"    Relationship: Self    Best call back number:  [phone]     What is the best time to reach you: AFTER 2PM    Who are you requesting to speak with (clinical staff, provider,  specific staff member): LOREN STEPHENSON OR CLINICAL      What was the call regarding: NEEDS UA RESULTS PLEASE.    Is it okay if the provider responds through [employer]: DOESN'T HAVE ONE.      "

## 2024-08-12 DIAGNOSIS — N30.00 ACUTE CYSTITIS WITHOUT HEMATURIA: ICD-10-CM

## 2024-08-12 RX ORDER — NITROFURANTOIN 25; 75 MG/1; MG/1
100 CAPSULE ORAL 2 TIMES DAILY
Qty: 10 CAPSULE | Refills: 0 | Status: CANCELLED | OUTPATIENT
Start: 2024-08-12

## 2024-08-12 NOTE — TELEPHONE ENCOUNTER
"Caller: Earline Santacruz \"Earline yoon\"    Relationship: Self    Best call back number: [phone]     Requested Prescriptions:   Requested Prescriptions     Pending Prescriptions Disp Refills    nitrofurantoin, macrocrystal-monohydrate, (Macrobid) 100 MG capsule 10 capsule 0     Sig: Take 1 capsule by mouth 2 (Two) Times a Day.        Pharmacy where request should be sent: [address] [phone] [facility] [phone] FX     Last office visit with prescribing clinician: 8/1/2024   Last telemedicine visit with prescribing clinician: Visit date not found   Next office visit with prescribing clinician: Visit date not found     Additional details provided by patient: COLD CHILLS, BURNING, FREQUENCY    PATIENT THINKS HER UTI IS BACK OR IT DIDN'T GO AWAY. PATIENT IS REQUESTING ANOTHER ROUND OF ANTIBIOTICS TO FULLY TREAT. PLEASE ADVISE     Does the patient have less than a 3 day supply:  [x] Yes  [] No    Would you like a call back once the refill request has been completed: [x] Yes [] No    If the office needs to give you a call back, can they leave a voicemail: [x] Yes [] No    Jt Wilde Rep   08/12/24 11:16 EDT     "

## 2024-08-16 ENCOUNTER — OFFICE VISIT (OUTPATIENT)
Dept: FAMILY MEDICINE CLINIC | Facility: CLINIC | Age: 79
End: 2024-08-16
Payer: MEDICARE

## 2024-08-16 VITALS
RESPIRATION RATE: 18 BRPM | TEMPERATURE: 98.2 F | DIASTOLIC BLOOD PRESSURE: 82 MMHG | BODY MASS INDEX: 26.81 KG/M2 | OXYGEN SATURATION: 98 % | WEIGHT: 142 LBS | SYSTOLIC BLOOD PRESSURE: 128 MMHG | HEIGHT: 61 IN | HEART RATE: 74 BPM

## 2024-08-16 DIAGNOSIS — N30.90 RECURRENT CYSTITIS: ICD-10-CM

## 2024-08-16 DIAGNOSIS — R30.0 DYSURIA: Primary | ICD-10-CM

## 2024-08-16 LAB
BILIRUB BLD-MCNC: NEGATIVE MG/DL
CLARITY, POC: CLEAR
COLOR UR: YELLOW
GLUCOSE UR STRIP-MCNC: NEGATIVE MG/DL
KETONES UR QL: NEGATIVE
LEUKOCYTE EST, POC: ABNORMAL
NITRITE UR-MCNC: NEGATIVE MG/ML
PH UR: 6 [PH] (ref 5–8)
PROT UR STRIP-MCNC: NEGATIVE MG/DL
RBC # UR STRIP: ABNORMAL /UL
SP GR UR: 1.02 (ref 1–1.03)
UROBILINOGEN UR QL: ABNORMAL

## 2024-08-16 PROCEDURE — 3079F DIAST BP 80-89 MM HG: CPT | Performed by: NURSE PRACTITIONER

## 2024-08-16 PROCEDURE — 81002 URINALYSIS NONAUTO W/O SCOPE: CPT | Performed by: NURSE PRACTITIONER

## 2024-08-16 PROCEDURE — 99213 OFFICE O/P EST LOW 20 MIN: CPT | Performed by: NURSE PRACTITIONER

## 2024-08-16 PROCEDURE — 3074F SYST BP LT 130 MM HG: CPT | Performed by: NURSE PRACTITIONER

## 2024-08-16 PROCEDURE — 1160F RVW MEDS BY RX/DR IN RCRD: CPT | Performed by: NURSE PRACTITIONER

## 2024-08-16 PROCEDURE — 1159F MED LIST DOCD IN RCRD: CPT | Performed by: NURSE PRACTITIONER

## 2024-08-16 RX ORDER — CIPROFLOXACIN 500 MG/1
500 TABLET, FILM COATED ORAL DAILY
Qty: 3 TABLET | Refills: 0 | Status: SHIPPED | OUTPATIENT
Start: 2024-08-16

## 2024-08-16 NOTE — PROGRESS NOTES
"    Office Note     Name: Earline Santacruz    : 1945     MRN: [id number]     Chief Complaint  Dysuria    Subjective     History of Present Illness:  Earline Santacruz is a 79 y.o. female who presents today for complaints of dysuria.  Patient was just in our office approximately 2 weeks ago with several days of urinary symptoms including pyuria, chills, fatigue and dysuria.  She has history of frequent urinary tract infections, having been evaluated by urology, Dr. Vasques in the past.  She was put on prophylaxis of memantine but has failed to be compliant stating it tastes terrible.  On multiple occasions I had encouraged her to get back with Dr. Vasques due to increased frequency of her UTI issues over the last 6 months but she declined.  During that last visit 2 weeks ago she was agreeable to referral to Dr. Pfeiffer.  On that particular day she was positive for leukocytes and nitrites but negative for blood.  She was given 5-day course of Macrobid.  Upon return today patient states that her symptoms only resolved for 2 to 3 days before the same symptoms recurred.  When asked about being contacted by Dr. Pfeiffer's office she said she had not, though admittedly she says she does not answer \"spam calls\".  She has no further complaints or concerns today    Review of Systems   Constitutional:  Positive for chills. Negative for fatigue and fever.   Respiratory:  Negative for cough and shortness of breath.    Gastrointestinal:  Negative for abdominal pain, diarrhea, nausea and vomiting.   Genitourinary:  Positive for dysuria, frequency, urgency and urinary incontinence. Negative for flank pain, hematuria and pelvic pressure.   Musculoskeletal:  Positive for back pain. Negative for joint swelling.   Neurological:  Negative for weakness, light-headedness and headache.   Hematological:  Does not bruise/bleed easily.       Objective     Past Medical History:   Diagnosis Date    Diabetes mellitus     Hyperlipidemia  " "   Hypertension     Panic attacks      Past Surgical History:   Procedure Laterality Date    APPENDECTOMY      BREAST LUMPECTOMY      RIGHT     CATARACT EXTRACTION, BILATERAL      HAND SURGERY      LEFT     TOTAL ABDOMINAL HYSTERECTOMY       Family History   Problem Relation Age of Onset    No Known Problems Mother     No Known Problems Father        Vital Signs  /82 (BP Location: Left arm, Patient Position: Sitting, Cuff Size: Adult)   Pulse 74   Temp 98.2 °F (36.8 °C) (Temporal)   Resp 18   Ht 154.9 cm (61\")   Wt 64.4 kg (142 lb)   SpO2 98%   BMI 26.83 kg/m²   Estimated body mass index is 26.83 kg/m² as calculated from the following:    Height as of this encounter: 154.9 cm (61\").    Weight as of this encounter: 64.4 kg (142 lb).  Facility age limit for growth %edwardo is 20 years.    Physical Exam  Vitals reviewed.   Constitutional:       Appearance: Normal appearance.   HENT:      Right Ear: Tympanic membrane, ear canal and external ear normal.      Left Ear: Tympanic membrane, ear canal and external ear normal.      Nose: Nose normal.      Mouth/Throat:      Mouth: Mucous membranes are moist.      Pharynx: Oropharynx is clear.   Eyes:      Conjunctiva/sclera: Conjunctivae normal.   Cardiovascular:      Rate and Rhythm: Normal rate and regular rhythm.      Pulses: Normal pulses.      Heart sounds: Normal heart sounds.   Pulmonary:      Effort: Pulmonary effort is normal.      Breath sounds: Normal breath sounds.   Abdominal:      General: Bowel sounds are normal.      Palpations: Abdomen is soft.      Tenderness: There is no right CVA tenderness or left CVA tenderness.   Musculoskeletal:      Cervical back: Neck supple.   Skin:     General: Skin is warm and dry.      Capillary Refill: Capillary refill takes less than 2 seconds.   Neurological:      Mental Status: She is alert and oriented to person, place, and time.   Psychiatric:         Mood and Affect: Mood normal.         Behavior: Behavior normal. "         Thought Content: Thought content normal.         Judgment: Judgment normal.          POCT Results (if applicable):  Results for orders placed or performed in visit on 08/16/24   POC Urinalysis Dipstick    Specimen: Urine   Result Value Ref Range    Color Yellow Yellow, Straw, Dark Yellow, Kathy    Clarity, UA Clear Clear    Glucose, UA Negative Negative mg/dL    Bilirubin Negative Negative    Ketones, UA Negative Negative    Specific Gravity  1.025 1.005 - 1.030    Blood, UA 1+ (A) Negative    pH, Urine 6.0 5.0 - 8.0    Protein, POC Negative Negative mg/dL    Urobilinogen, UA 0.2 E.U./dL Normal, 0.2 E.U./dL    Leukocytes Large (3+) (A) Negative    Nitrite, UA Negative Negative            Assessment and Plan     Diagnoses and all orders for this visit:    1. Dysuria (Primary)  -     POC Urinalysis Dipstick  -     Cancel: Urine Culture - Urine, Urine, Clean Catch  -     Urine Culture - Urine, Urine, Clean Catch    2. Recurrent cystitis  Assessment & Plan:  presents today for complaints of dysuria.  Patient was just in our office approximately 2 weeks ago with several days of urinary symptoms including pyuria, chills, fatigue and dysuria.  She has history of frequent urinary tract infections, having been evaluated by urology, Dr. Vasques in the past.  She was put on prophylaxis of memantine but has failed to be compliant stating it tastes terrible.  On multiple occasions I had encouraged her to get back with Dr. Vasques due to increased frequency of her UTI issues over the last 6 months but she declined.  During that last visit 2 weeks ago she was agreeable to referral to Dr. Pfeiffer.  On that particular day she was positive for leukocytes and nitrites but negative for blood.  She was given 5-day course of Macrobid.  Upon return today patient states that her symptoms only resolved for 2 to 3 days before the same symptoms recurred.  When asked about being contacted by Dr. Pfeiffer's office she said she had not,  "though admittedly she says she does not answer \"spam calls\".  -Another referral was placed for Dr. Pfeiffer, patient encouraged to answer the phone even with unknown numbers until her appointment is made  -Will treat with round of ciprofloxacin given lack of response to Macrobid.  Also sending urine for culture.    Orders:  -     ciprofloxacin (Cipro) 500 MG tablet; Take 1 tablet by mouth Daily.  Dispense: 3 tablet; Refill: 0  -     Ambulatory Referral to Urology           Follow Up  No follow-ups on file.    Nimo Hinds, APRN  "

## 2024-08-20 LAB
BACTERIA UR CULT: ABNORMAL
BACTERIA UR CULT: ABNORMAL
OTHER ANTIBIOTIC SUSC ISLT: ABNORMAL

## 2024-08-20 NOTE — ASSESSMENT & PLAN NOTE
"presents today for complaints of dysuria.  Patient was just in our office approximately 2 weeks ago with several days of urinary symptoms including pyuria, chills, fatigue and dysuria.  She has history of frequent urinary tract infections, having been evaluated by urology, Dr. Vasques in the past.  She was put on prophylaxis of memantine but has failed to be compliant stating it tastes terrible.  On multiple occasions I had encouraged her to get back with Dr. Vasques due to increased frequency of her UTI issues over the last 6 months but she declined.  During that last visit 2 weeks ago she was agreeable to referral to Dr. Pfeiffer.  On that particular day she was positive for leukocytes and nitrites but negative for blood.  She was given 5-day course of Macrobid.  Upon return today patient states that her symptoms only resolved for 2 to 3 days before the same symptoms recurred.  When asked about being contacted by Dr. Pfeiffer's office she said she had not, though admittedly she says she does not answer \"spam calls\".  -Another referral was placed for Dr. Pfeiffer, patient encouraged to answer the phone even with unknown numbers until her appointment is made  -Will treat with round of ciprofloxacin given lack of response to Macrobid.  Also sending urine for culture.  "

## 2024-08-26 DIAGNOSIS — E11.42 TYPE 2 DIABETES MELLITUS WITH DIABETIC POLYNEUROPATHY, WITHOUT LONG-TERM CURRENT USE OF INSULIN: ICD-10-CM

## 2024-08-26 RX ORDER — GLIPIZIDE 10 MG/1
10 TABLET, FILM COATED, EXTENDED RELEASE ORAL DAILY
Qty: 30 TABLET | Refills: 0 | Status: SHIPPED | OUTPATIENT
Start: 2024-08-26

## 2024-08-30 RX ORDER — TOLTERODINE 4 MG/1
4 CAPSULE, EXTENDED RELEASE ORAL DAILY
Qty: 30 CAPSULE | Refills: 1 | Status: SHIPPED | OUTPATIENT
Start: 2024-08-30

## 2024-08-30 NOTE — TELEPHONE ENCOUNTER
When I told her she could take two daily she was only on 2mg for a total of 4mg, now that she's taking 4mg she can only take the one. When you talk to her can you please confirm she has gotten set up with urology. I have placed a referral twice and she won't answer the phone. If she still hasn't give her the number to Dr. Pfeiffer to make the appt herself. It is [phone]

## 2024-08-30 NOTE — TELEPHONE ENCOUNTER
PT STATES THAT LOREN TOLD HER TO START TAKING 2 DAILY AND THAT IS WORKING FOR HER SO SHE HAS RUN OUT QUICKER.

## 2024-09-26 ENCOUNTER — EXTERNAL PBMM DATA (OUTPATIENT)
Dept: PHARMACY | Facility: OTHER | Age: 79
End: 2024-09-26
Payer: MEDICARE

## 2024-10-09 DIAGNOSIS — F41.9 ANXIETY AND DEPRESSION: ICD-10-CM

## 2024-10-09 DIAGNOSIS — F32.A ANXIETY AND DEPRESSION: ICD-10-CM

## 2024-10-09 RX ORDER — ALPRAZOLAM 0.5 MG
0.5 TABLET ORAL 2 TIMES DAILY PRN
Qty: 60 TABLET | Refills: 0 | Status: SHIPPED | OUTPATIENT
Start: 2024-10-09

## 2024-10-13 DIAGNOSIS — E11.42 TYPE 2 DIABETES MELLITUS WITH DIABETIC POLYNEUROPATHY, WITHOUT LONG-TERM CURRENT USE OF INSULIN: ICD-10-CM

## 2024-10-14 RX ORDER — GLIPIZIDE 10 MG/1
10 TABLET, FILM COATED, EXTENDED RELEASE ORAL DAILY
Qty: 30 TABLET | Refills: 0 | Status: SHIPPED | OUTPATIENT
Start: 2024-10-14

## 2024-10-15 ENCOUNTER — POP HEALTH PHARMACY (OUTPATIENT)
Dept: PHARMACY | Facility: OTHER | Age: 79
End: 2024-10-15
Payer: MEDICARE

## 2024-10-15 NOTE — PROGRESS NOTES
"Population Health Pharmacy Outreach      Earline Santacruz was called today to discuss medication adherence with ROSUVASTATIN CALCIUM ([facility] COA REDUCTASE INHIBITORS) , as she was identified as having care opportunities.    Program Details    [facility]  Status: Enrolled  Effective Dates: 10/14/2024 - present  Responsible Staff: Tasneem Beltrán LPN          Opportunities Identified    Adherence- Cholesterol       Adherence and Medication Management    Adherence Questions   Patient Reported X Missed Doses in the Last 7 Days : 0  Reasons for Non-Adherence : Other - see comments  List other reason(s) for non-adherence or missed doses: Patient stated that her med is \"already taken care of\", Parveen had already called her.  Interested in a 90 day supply? : Already receiving  Does this require clinical escalation to a pharmacist?: N         General Medication Management    Type of medication management: targeted medication review  Review Completed on: 10/15/24  Referred by: pharmacist  Provider: licensed practical nurse  Visit type: initial  Method of contact: by telephone           Medication Therapy Problems     Medication Therapy Recommendations  No medication therapy recommendations to display      Summary    Medication Management Summary    Topics discussed: adherence and missed doses discussed, reminder to refill or  medication discussed  Time spent: 61 - 75 min         Tasneem Beltrán LPN, 10/15/24, 4:01 PM EDT.   "

## 2024-10-29 ENCOUNTER — TELEPHONE (OUTPATIENT)
Dept: FAMILY MEDICINE CLINIC | Facility: CLINIC | Age: 79
End: 2024-10-29
Payer: MEDICARE

## 2024-10-29 RX ORDER — LEVOTHYROXINE SODIUM 50 UG/1
50 TABLET ORAL DAILY
Qty: 90 TABLET | Refills: 0 | Status: SHIPPED | OUTPATIENT
Start: 2024-10-29

## 2024-10-29 NOTE — TELEPHONE ENCOUNTER
"Caller: Earline Santacruz \"Earline yoon\"    Relationship: Self    Best call back number:[phone]     What medication are you requesting: levothyroxine (SYNTHROID, LEVOTHROID) 50 MCG tablet       If a prescription is needed, what is your preferred pharmacy and phone number: [facility] [address] [place] [address] [phone] [facility] [phone]      Additional notes: PLEASE SEND LEVOTHYROXINE RX TO [facility], THE OTHER PHARMACY WON'T TRANSFER IT AND [facility] IS WHERE PATIENT WANTS TO GO GET IT  "

## 2024-10-31 ENCOUNTER — POP HEALTH PHARMACY (OUTPATIENT)
Dept: PHARMACY | Facility: OTHER | Age: 79
End: 2024-10-31
Payer: MEDICARE

## 2024-11-06 ENCOUNTER — OFFICE VISIT (OUTPATIENT)
Dept: FAMILY MEDICINE CLINIC | Facility: CLINIC | Age: 79
End: 2024-11-06
Payer: MEDICARE

## 2024-11-06 VITALS
RESPIRATION RATE: 18 BRPM | SYSTOLIC BLOOD PRESSURE: 128 MMHG | TEMPERATURE: 97.8 F | HEIGHT: 61 IN | BODY MASS INDEX: 27.94 KG/M2 | DIASTOLIC BLOOD PRESSURE: 86 MMHG | HEART RATE: 95 BPM | WEIGHT: 148 LBS | OXYGEN SATURATION: 97 %

## 2024-11-06 DIAGNOSIS — I25.10 CORONARY ARTERY DISEASE INVOLVING NATIVE CORONARY ARTERY OF NATIVE HEART WITHOUT ANGINA PECTORIS: ICD-10-CM

## 2024-11-06 DIAGNOSIS — I10 PRIMARY HYPERTENSION: Chronic | ICD-10-CM

## 2024-11-06 DIAGNOSIS — R60.0 BILATERAL LOWER EXTREMITY EDEMA: Primary | ICD-10-CM

## 2024-11-06 RX ORDER — FUROSEMIDE 20 MG/1
20 TABLET ORAL DAILY
Qty: 7 TABLET | Refills: 0 | Status: SHIPPED | OUTPATIENT
Start: 2024-11-06 | End: 2024-11-13

## 2024-11-06 NOTE — PROGRESS NOTES
Venipuncture Blood Specimen Collection  Venipuncture performed in left arm by Reyna Mojica MA with good hemostasis. Patient tolerated the procedure well without complications.   11/06/24   Reyna Mojica MA

## 2024-11-06 NOTE — PROGRESS NOTES
Office Note     Name: Earline Santacruz    : 1945     MRN: [id number]     Chief Complaint  Patient states that her legs are swelling and her whole body    Subjective     History of Present Illness:  Earline Santacruz is a 79 y.o. female who presents today for bilateral lower extremity edema.  Patient states she first noticed the swelling about 3 days ago.  She denies any chest pain or shortness of breath.  She denies any increased sodium intake.  Patient without any previous history of CHF.  Patient does have history of coronary artery disease, having undergone cardiac cath in 2015 with signs of significant coronary artery disease involving the second diagonal branch which was ultimately too small for intervention.  She had been lost to follow-up for quite some time until 3 months ago at which time I referred her to Dr. Albert due to some subscapular discomfort and feelings of chronic indigestion.  She underwent low risk stress test at that time.  She has never had any issues with kidney dysfunction.  No further complaints or concerns    Review of Systems   Constitutional:  Positive for fatigue. Negative for chills.   Respiratory:  Negative for cough, chest tightness, shortness of breath and wheezing.    Cardiovascular:  Positive for leg swelling. Negative for chest pain and palpitations.   Gastrointestinal:  Negative for abdominal pain, constipation, diarrhea, nausea and vomiting.   Genitourinary:  Negative for decreased urine volume, difficulty urinating, frequency, hematuria and urgency.   Musculoskeletal:  Negative for joint swelling.   Skin:  Positive for rash.   Neurological:  Negative for dizziness, weakness, light-headedness and headache.       Objective     Past Medical History:   Diagnosis Date    Diabetes mellitus     Hyperlipidemia     Hypertension     Panic attacks      Past Surgical History:   Procedure Laterality Date    APPENDECTOMY      BREAST LUMPECTOMY      RIGHT     CATARACT  "EXTRACTION, BILATERAL      HAND SURGERY      LEFT     TOTAL ABDOMINAL HYSTERECTOMY       Family History   Problem Relation Age of Onset    No Known Problems Mother     No Known Problems Father        Vital Signs  /86 (BP Location: Left arm, Patient Position: Sitting, Cuff Size: Adult)   Pulse 95   Temp 97.8 °F (36.6 °C) (Temporal)   Resp 18   Ht 154.9 cm (61\")   Wt 67.1 kg (148 lb)   SpO2 97%   BMI 27.96 kg/m²   Estimated body mass index is 27.96 kg/m² as calculated from the following:    Height as of this encounter: 154.9 cm (61\").    Weight as of this encounter: 67.1 kg (148 lb).  Facility age limit for growth %edwardo is 20 years.    Physical Exam  Vitals reviewed.   Constitutional:       Appearance: Normal appearance.   HENT:      Head: Normocephalic and atraumatic.      Right Ear: Tympanic membrane, ear canal and external ear normal.      Left Ear: Tympanic membrane, ear canal and external ear normal.      Nose: Nose normal.      Mouth/Throat:      Mouth: Mucous membranes are dry.      Pharynx: Oropharynx is clear.   Eyes:      Conjunctiva/sclera: Conjunctivae normal.      Pupils: Pupils are equal, round, and reactive to light.   Cardiovascular:      Rate and Rhythm: Normal rate and regular rhythm.      Pulses:           Dorsalis pedis pulses are 1+ on the right side and 1+ on the left side.        Posterior tibial pulses are 1+ on the right side and 1+ on the left side.      Heart sounds: Normal heart sounds.   Pulmonary:      Effort: Pulmonary effort is normal.      Breath sounds: Normal breath sounds.   Musculoskeletal:      Cervical back: Neck supple.      Right lower le+ Pitting Edema present.      Left lower le+ Pitting Edema present.   Skin:     General: Skin is warm and dry.   Neurological:      Mental Status: She is alert and oriented to person, place, and time.              POCT Results (if applicable):  Results for orders placed or performed in visit on 24   TSH Rfx On " Abnormal To Free T4    Collection Time: 11/06/24  3:09 PM    Specimen: Arm, Left; Blood    Blood  Release to mil   Result Value Ref Range    TSH 1.210 0.450 - 4.500 uIU/mL   Sedimentation Rate    Collection Time: 11/06/24  3:09 PM    Specimen: Arm, Left; Blood    Blood  Release to mil   Result Value Ref Range    Sed Rate 2 0 - 40 mm/hr   Comprehensive Metabolic Panel    Collection Time: 11/06/24  3:09 PM    Specimen: Arm, Left; Blood    Blood  Release to mil   Result Value Ref Range    Glucose 142 (H) 70 - 99 mg/dL    BUN 17 8 - 27 mg/dL    Creatinine 0.82 0.57 - 1.00 mg/dL    EGFR Result 73 >59 mL/min/1.73    BUN/Creatinine Ratio 21 12 - 28    Sodium 144 134 - 144 mmol/L    Potassium 4.2 3.5 - 5.2 mmol/L    Chloride 106 96 - 106 mmol/L    Total CO2 24 20 - 29 mmol/L    Calcium 9.9 8.7 - 10.3 mg/dL    Total Protein 6.4 6.0 - 8.5 g/dL    Albumin 4.5 3.8 - 4.8 g/dL    Globulin 1.9 1.5 - 4.5 g/dL    Total Bilirubin 0.3 0.0 - 1.2 mg/dL    Alkaline Phosphatase 57 44 - 121 IU/L    AST (SGOT) 19 0 - 40 IU/L    ALT (SGPT) 16 0 - 32 IU/L   C-reactive Protein    Collection Time: 11/06/24  3:09 PM    Specimen: Arm, Left; Blood    Blood  Release to mil   Result Value Ref Range    C-Reactive Protein 2 0 - 10 mg/L   CBC & Differential    Collection Time: 11/06/24  3:09 PM    Specimen: Arm, Left; Blood    Blood  Release to mil   Result Value Ref Range    WBC 9.5 3.4 - 10.8 x10E3/uL    RBC 4.14 3.77 - 5.28 x10E6/uL    Hemoglobin 13.2 11.1 - 15.9 g/dL    Hematocrit 40.6 34.0 - 46.6 %    MCV 98 (H) 79 - 97 fL    MCH 31.9 26.6 - 33.0 pg    MCHC 32.5 31.5 - 35.7 g/dL    RDW 13.1 11.7 - 15.4 %    Platelets 281 150 - 450 x10E3/uL    Neutrophil Rel % 45 Not Estab. %    Lymphocyte Rel % 46 Not Estab. %    Monocyte Rel % 6 Not Estab. %    Eosinophil Rel % 2 Not Estab. %    Basophil Rel % 1 Not Estab. %    Neutrophils Absolute 4.3 1.4 - 7.0 x10E3/uL    Lymphocytes Absolute 4.4 (H) 0.7 - 3.1 x10E3/uL    Monocytes Absolute 0.6 0.1 - 0.9  x10E3/uL    Eosinophils Absolute 0.2 0.0 - 0.4 x10E3/uL    Basophils Absolute 0.1 0.0 - 0.2 x10E3/uL    Immature Granulocyte Rel % 0 Not Estab. %    Immature Grans Absolute 0.0 0.0 - 0.1 x10E3/uL   POC Urinalysis Dipstick    Collection Time: 11/07/24 10:03 AM    Specimen: Urine   Result Value Ref Range    Color Yellow Yellow, Straw, Dark Yellow, Kathy    Clarity, UA Cloudy (A) Clear    Glucose, UA Negative Negative mg/dL    Bilirubin Negative Negative    Ketones, UA Negative Negative    Specific Gravity  1.025 1.005 - 1.030    Blood, UA Negative Negative    pH, Urine 6.0 5.0 - 8.0    Protein, POC Negative Negative mg/dL    Urobilinogen, UA Normal Normal, 0.2 E.U./dL    Leukocytes Negative Negative    Nitrite, UA Negative Negative    Lot Number [id number]     Expiration Date 10-21-25             Assessment and Plan     Diagnoses and all orders for this visit:    1. Bilateral lower extremity edema (Primary)  Assessment & Plan:  Patient first noticed swelling about 3 days ago without any worrisome symptoms such as chest pain or shortness of breath.  She has had no recent changes in her medications, does not think she has increased sodium intake.  Patient has been more sedentary due to caretaking for one of her friends that has dementia.  -Will draw labs to rule out any type of kidney dysfunction, proteinuria or thyroid condition.  -Patient given prescription for Lasix 20 mg daily to be started.  Patient advised to elevate legs when able and decrease dietary sodium.  -Follow-up in 2-week    Orders:  -     CBC & Differential; Future  -     Comprehensive Metabolic Panel; Future  -     C-reactive Protein; Future  -     Sedimentation Rate; Future  -     furosemide (Lasix) 20 MG tablet; Take 1 tablet by mouth Daily for 7 days.  Dispense: 7 tablet; Refill: 0  -     TSH Rfx On Abnormal To Free T4; Future  -     POC Urinalysis Dipstick  -     TSH Rfx On Abnormal To Free T4  -     Sedimentation Rate  -     Comprehensive  Metabolic Panel  -     C-reactive Protein  -     CBC & Differential    2. Coronary artery disease involving native coronary artery of native heart without angina pectoris  Assessment & Plan:   history of coronary artery disease, having undergone cardiac cath in April 2015 with signs of significant coronary artery disease involving the second diagonal branch which was ultimately too small for intervention.  She had been lost to follow-up for quite some time until 3 months ago at which time I referred her to Dr. Albert due to some subscapular discomfort and feelings of chronic indigestion.  She underwent low risk stress test at that time.      3. Primary hypertension  Assessment & Plan:  Blood pressure has not been requiring any antihypertensive agents for quite some time, some diastolic hypertension in office today, 128/86.  Should correct with diuretic therapy, patient instructed to check her blood pressure once daily for review on next visit                 Follow Up  Return in about 2 weeks (around 11/20/2024) for Next scheduled follow up.    Nimo Hinds, APRN

## 2024-11-07 DIAGNOSIS — E11.40 TYPE 2 DIABETES MELLITUS WITH DIABETIC NEUROPATHY, WITHOUT LONG-TERM CURRENT USE OF INSULIN: ICD-10-CM

## 2024-11-07 LAB
ALBUMIN SERPL-MCNC: 4.5 G/DL (ref 3.8–4.8)
ALP SERPL-CCNC: 57 IU/L (ref 44–121)
ALT SERPL-CCNC: 16 IU/L (ref 0–32)
AST SERPL-CCNC: 19 IU/L (ref 0–40)
BASOPHILS # BLD AUTO: 0.1 X10E3/UL (ref 0–0.2)
BASOPHILS NFR BLD AUTO: 1 %
BILIRUB BLD-MCNC: NEGATIVE MG/DL
BILIRUB SERPL-MCNC: 0.3 MG/DL (ref 0–1.2)
BUN SERPL-MCNC: 17 MG/DL (ref 8–27)
BUN/CREAT SERPL: 21 (ref 12–28)
CALCIUM SERPL-MCNC: 9.9 MG/DL (ref 8.7–10.3)
CHLORIDE SERPL-SCNC: 106 MMOL/L (ref 96–106)
CLARITY, POC: ABNORMAL
CO2 SERPL-SCNC: 24 MMOL/L (ref 20–29)
COLOR UR: YELLOW
CREAT SERPL-MCNC: 0.82 MG/DL (ref 0.57–1)
CRP SERPL-MCNC: 2 MG/L (ref 0–10)
EGFRCR SERPLBLD CKD-EPI 2021: 73 ML/MIN/1.73
EOSINOPHIL # BLD AUTO: 0.2 X10E3/UL (ref 0–0.4)
EOSINOPHIL NFR BLD AUTO: 2 %
ERYTHROCYTE [DISTWIDTH] IN BLOOD BY AUTOMATED COUNT: 13.1 % (ref 11.7–15.4)
ERYTHROCYTE [SEDIMENTATION RATE] IN BLOOD BY WESTERGREN METHOD: 2 MM/HR (ref 0–40)
EXPIRATION DATE: ABNORMAL
GLOBULIN SER CALC-MCNC: 1.9 G/DL (ref 1.5–4.5)
GLUCOSE SERPL-MCNC: 142 MG/DL (ref 70–99)
GLUCOSE UR STRIP-MCNC: NEGATIVE MG/DL
HCT VFR BLD AUTO: 40.6 % (ref 34–46.6)
HGB BLD-MCNC: 13.2 G/DL (ref 11.1–15.9)
IMM GRANULOCYTES # BLD AUTO: 0 X10E3/UL (ref 0–0.1)
IMM GRANULOCYTES NFR BLD AUTO: 0 %
KETONES UR QL: NEGATIVE
LEUKOCYTE EST, POC: NEGATIVE
LYMPHOCYTES # BLD AUTO: 4.4 X10E3/UL (ref 0.7–3.1)
LYMPHOCYTES NFR BLD AUTO: 46 %
Lab: ABNORMAL
MCH RBC QN AUTO: 31.9 PG (ref 26.6–33)
MCHC RBC AUTO-ENTMCNC: 32.5 G/DL (ref 31.5–35.7)
MCV RBC AUTO: 98 FL (ref 79–97)
MONOCYTES # BLD AUTO: 0.6 X10E3/UL (ref 0.1–0.9)
MONOCYTES NFR BLD AUTO: 6 %
NEUTROPHILS # BLD AUTO: 4.3 X10E3/UL (ref 1.4–7)
NEUTROPHILS NFR BLD AUTO: 45 %
NITRITE UR-MCNC: NEGATIVE MG/ML
PH UR: 6 [PH] (ref 5–8)
PLATELET # BLD AUTO: 281 X10E3/UL (ref 150–450)
POTASSIUM SERPL-SCNC: 4.2 MMOL/L (ref 3.5–5.2)
PROT SERPL-MCNC: 6.4 G/DL (ref 6–8.5)
PROT UR STRIP-MCNC: NEGATIVE MG/DL
RBC # BLD AUTO: 4.14 X10E6/UL (ref 3.77–5.28)
RBC # UR STRIP: NEGATIVE /UL
SODIUM SERPL-SCNC: 144 MMOL/L (ref 134–144)
SP GR UR: 1.02 (ref 1–1.03)
TSH SERPL DL<=0.005 MIU/L-ACNC: 1.21 UIU/ML (ref 0.45–4.5)
UROBILINOGEN UR QL: NORMAL
WBC # BLD AUTO: 9.5 X10E3/UL (ref 3.4–10.8)

## 2024-11-07 RX ORDER — GABAPENTIN 300 MG/1
300 CAPSULE ORAL 3 TIMES DAILY
Qty: 90 CAPSULE | Refills: 0 | Status: SHIPPED | OUTPATIENT
Start: 2024-11-07

## 2024-11-08 ENCOUNTER — TELEPHONE (OUTPATIENT)
Dept: FAMILY MEDICINE CLINIC | Facility: CLINIC | Age: 79
End: 2024-11-08
Payer: MEDICARE

## 2024-11-08 NOTE — TELEPHONE ENCOUNTER
Called and spoke with patient and advised her of lab results. She verbalized that the swelling is a little better.

## 2024-11-08 NOTE — ASSESSMENT & PLAN NOTE
Patient first noticed swelling about 3 days ago without any worrisome symptoms such as chest pain or shortness of breath.  She has had no recent changes in her medications, does not think she has increased sodium intake.  Patient has been more sedentary due to caretaking for one of her friends that has dementia.  -Will draw labs to rule out any type of kidney dysfunction, proteinuria or thyroid condition.  -Patient given prescription for Lasix 20 mg daily to be started.  Patient advised to elevate legs when able and decrease dietary sodium.  -Follow-up in 2-week

## 2024-11-08 NOTE — ASSESSMENT & PLAN NOTE
Blood pressure has not been requiring any antihypertensive agents for quite some time, some diastolic hypertension in office today, 128/86.  Should correct with diuretic therapy, patient instructed to check her blood pressure once daily for review on next visit

## 2024-11-08 NOTE — ASSESSMENT & PLAN NOTE
history of coronary artery disease, having undergone cardiac cath in April 2015 with signs of significant coronary artery disease involving the second diagonal branch which was ultimately too small for intervention.  She had been lost to follow-up for quite some time until 3 months ago at which time I referred her to Dr. Albert due to some subscapular discomfort and feelings of chronic indigestion.  She underwent low risk stress test at that time.

## 2024-11-08 NOTE — TELEPHONE ENCOUNTER
Caller: Earline Santacruz    Relationship: Self    Best call back number: [phone]    Caller requesting test results: YES    What test was performed: LAB    When was the test performed: WEDNESDAY    Where was the test performed: OFFICE

## 2024-11-10 DIAGNOSIS — E11.42 TYPE 2 DIABETES MELLITUS WITH DIABETIC POLYNEUROPATHY, WITHOUT LONG-TERM CURRENT USE OF INSULIN: ICD-10-CM

## 2024-11-11 RX ORDER — GLIPIZIDE 10 MG/1
10 TABLET, FILM COATED, EXTENDED RELEASE ORAL DAILY
Qty: 30 TABLET | Refills: 0 | Status: SHIPPED | OUTPATIENT
Start: 2024-11-11

## 2024-11-21 RX ORDER — TOLTERODINE 4 MG/1
4 CAPSULE, EXTENDED RELEASE ORAL DAILY
Qty: 30 CAPSULE | Refills: 0 | Status: SHIPPED | OUTPATIENT
Start: 2024-11-21

## 2024-12-05 DIAGNOSIS — F32.A ANXIETY AND DEPRESSION: ICD-10-CM

## 2024-12-05 DIAGNOSIS — F41.9 ANXIETY AND DEPRESSION: ICD-10-CM

## 2024-12-05 RX ORDER — ALPRAZOLAM 0.5 MG
0.5 TABLET ORAL 2 TIMES DAILY PRN
Qty: 60 TABLET | Refills: 0 | Status: SHIPPED | OUTPATIENT
Start: 2024-12-05

## 2024-12-09 ENCOUNTER — TELEPHONE (OUTPATIENT)
Dept: FAMILY MEDICINE CLINIC | Facility: CLINIC | Age: 79
End: 2024-12-09
Payer: MEDICARE

## 2024-12-09 DIAGNOSIS — N30.00 ACUTE CYSTITIS WITHOUT HEMATURIA: ICD-10-CM

## 2024-12-09 RX ORDER — NITROFURANTOIN 25; 75 MG/1; MG/1
100 CAPSULE ORAL 2 TIMES DAILY
Qty: 10 CAPSULE | Refills: 0 | Status: SHIPPED | OUTPATIENT
Start: 2024-12-09

## 2024-12-09 NOTE — TELEPHONE ENCOUNTER
Caller: Earline Santacruz    Relationship: Self    Best call back number: [phone]     What medication are you requesting: NITROUS SOMETHING, PATIENT DOESN'T REMEMBER THE NAME, SAID LOREN WOULD KNOW WHAT SHE NEEDS    What are your current symptoms: UTI    How long have you been experiencing symptoms: DAY AND A HALF    Have you had these symptoms before:    [x] Yes  [] No    Have you been treated for these symptoms before:   [x] Yes  [] No    If a prescription is needed, what is your preferred pharmacy and phone number:  [facility] [address] [place] [address] [phone] [facility] [phone]  [phone]

## 2024-12-12 DIAGNOSIS — E11.42 TYPE 2 DIABETES MELLITUS WITH DIABETIC POLYNEUROPATHY, WITHOUT LONG-TERM CURRENT USE OF INSULIN: ICD-10-CM

## 2024-12-12 RX ORDER — GLIPIZIDE 10 MG/1
10 TABLET, FILM COATED, EXTENDED RELEASE ORAL DAILY
Qty: 30 TABLET | Refills: 0 | Status: SHIPPED | OUTPATIENT
Start: 2024-12-12

## 2024-12-13 ENCOUNTER — POP HEALTH PHARMACY (OUTPATIENT)
Dept: PHARMACY | Facility: OTHER | Age: 79
End: 2024-12-13
Payer: MEDICARE

## 2024-12-19 RX ORDER — TOLTERODINE 4 MG/1
4 CAPSULE, EXTENDED RELEASE ORAL DAILY
Qty: 30 CAPSULE | Refills: 0 | Status: SHIPPED | OUTPATIENT
Start: 2024-12-19

## 2024-12-23 DIAGNOSIS — N30.00 ACUTE CYSTITIS WITHOUT HEMATURIA: ICD-10-CM

## 2024-12-23 RX ORDER — NITROFURANTOIN 25; 75 MG/1; MG/1
100 CAPSULE ORAL 2 TIMES DAILY
Qty: 10 CAPSULE | Refills: 0 | OUTPATIENT
Start: 2024-12-23

## 2024-12-23 NOTE — TELEPHONE ENCOUNTER
Called and left a message advising patient she needs to be seen for this medicine and Nimo is out of the office until Thursday. She can go to Urgent Care if patient would like

## 2024-12-23 NOTE — TELEPHONE ENCOUNTER
Caller: Earline Santacruz    Relationship: Self    Best call back number:  [phone]     Requested Prescriptions:   Requested Prescriptions     Pending Prescriptions Disp Refills    nitrofurantoin, macrocrystal-monohydrate, (Macrobid) 100 MG capsule 10 capsule 0     Sig: Take 1 capsule by mouth 2 (Two) Times a Day.        Pharmacy where request should be sent: [address] [phone] [facility] [phone] [facility]     Last office visit with prescribing clinician: 11/6/2024   Last telemedicine visit with prescribing clinician: Visit date not found   Next office visit with prescribing clinician: 12/26/2024     Additional details provided by patient: PATIENT STATED THAT SHE HAS CHRONIC UTI'S DUE TO DIABETIC AND HAS BEEN EATING TOO MUCH SUGAR.   STATED SHE PROMISED TO STOP.    Does the patient have less than a 3 day supply:  [x] Yes  [] No    Would you like a call back once the refill request has been completed: [] Yes [x] No    If the office needs to give you a call back, can they leave a voicemail: [] Yes [x] No    Jt Betts Rep   12/23/24 10:07 EST

## 2024-12-26 ENCOUNTER — OFFICE VISIT (OUTPATIENT)
Dept: FAMILY MEDICINE CLINIC | Facility: CLINIC | Age: 79
End: 2024-12-26
Payer: MEDICARE

## 2024-12-26 VITALS
RESPIRATION RATE: 16 BRPM | TEMPERATURE: 97.5 F | WEIGHT: 146 LBS | DIASTOLIC BLOOD PRESSURE: 82 MMHG | OXYGEN SATURATION: 96 % | SYSTOLIC BLOOD PRESSURE: 124 MMHG | HEART RATE: 81 BPM | BODY MASS INDEX: 27.56 KG/M2 | HEIGHT: 61 IN

## 2024-12-26 DIAGNOSIS — R30.0 DYSURIA: Primary | ICD-10-CM

## 2024-12-26 DIAGNOSIS — N30.00 ACUTE CYSTITIS WITHOUT HEMATURIA: ICD-10-CM

## 2024-12-26 DIAGNOSIS — E11.42 TYPE 2 DIABETES MELLITUS WITH DIABETIC POLYNEUROPATHY, WITHOUT LONG-TERM CURRENT USE OF INSULIN: ICD-10-CM

## 2024-12-26 DIAGNOSIS — E11.40 TYPE 2 DIABETES MELLITUS WITH DIABETIC NEUROPATHY, WITHOUT LONG-TERM CURRENT USE OF INSULIN: ICD-10-CM

## 2024-12-26 PROCEDURE — 3074F SYST BP LT 130 MM HG: CPT | Performed by: NURSE PRACTITIONER

## 2024-12-26 PROCEDURE — 1160F RVW MEDS BY RX/DR IN RCRD: CPT | Performed by: NURSE PRACTITIONER

## 2024-12-26 PROCEDURE — 99214 OFFICE O/P EST MOD 30 MIN: CPT | Performed by: NURSE PRACTITIONER

## 2024-12-26 PROCEDURE — 1159F MED LIST DOCD IN RCRD: CPT | Performed by: NURSE PRACTITIONER

## 2024-12-26 PROCEDURE — 3079F DIAST BP 80-89 MM HG: CPT | Performed by: NURSE PRACTITIONER

## 2024-12-26 RX ORDER — GABAPENTIN 300 MG/1
300 CAPSULE ORAL 3 TIMES DAILY
Qty: 90 CAPSULE | Refills: 0 | Status: SHIPPED | OUTPATIENT
Start: 2024-12-26

## 2024-12-26 RX ORDER — LEVOFLOXACIN 500 MG/1
500 TABLET, FILM COATED ORAL DAILY
Qty: 3 TABLET | Refills: 0 | Status: SHIPPED | OUTPATIENT
Start: 2024-12-26 | End: 2024-12-29

## 2024-12-26 NOTE — ASSESSMENT & PLAN NOTE
Patient's glucose had been very well-controlled until approximately 3 months ago when she discontinued her metformin thinking perhaps it was contributing to recent issues with bowel incontinence. At my urging she did resume her metformin with improvement in her A1c, but stated once she resumed metformin she became very gassy and wanted to discontinue. Subsequently metformin was discontinued, she has now been taking glipizide XL 10 mg daily. It is not time to recheck her A1c but she states her at home glucose measurements have been significantly improved, generally running 1 teens to high 90s.  Patient continues to utilize gabapentin 300 mg 3 times daily with excellent benefit in regards to her neuropathy

## 2024-12-26 NOTE — PROGRESS NOTES
"    Office Note     Name: Earline Santacruz    : 1945     MRN: [id number]     Chief Complaint  Difficulty Urinating    Subjective     History of Present Illness:  Earline Santacruz is a 79 y.o. female who presents today for urinary complaints which have actually improved since she made this appointment.  Patient states that she found to leftover pills of Levaquin at home.  Prior to taking the 2 pills patient was experiencing urinary incontinence, urgency, frequency and suprapubic pressure.  Patient has longstanding pattern of recurrent UTI, having been fully evaluated by urology in the past.  She denies any chills, low back pain, nausea.  Prior to taking the Levaquin she had been experiencing the symptoms for approximately 2 to 3 days.  No further complaints or concern    Review of Systems   Constitutional:  Negative for appetite change, chills, fatigue and fever.   Gastrointestinal:  Negative for abdominal pain, constipation, diarrhea, nausea and vomiting.   Genitourinary:  Positive for frequency, pelvic pressure, urgency and urinary incontinence. Negative for flank pain.   Neurological:  Negative for dizziness, weakness, light-headedness and headache.       Objective     Past Medical History:   Diagnosis Date    Diabetes mellitus     Hyperlipidemia     Hypertension     Panic attacks      Past Surgical History:   Procedure Laterality Date    APPENDECTOMY      BREAST LUMPECTOMY      RIGHT     CATARACT EXTRACTION, BILATERAL      HAND SURGERY      LEFT     TOTAL ABDOMINAL HYSTERECTOMY       Family History   Problem Relation Age of Onset    No Known Problems Mother     No Known Problems Father        Vital Signs  /82 (BP Location: Left arm, Patient Position: Sitting, Cuff Size: Adult)   Pulse 81   Temp 97.5 °F (36.4 °C) (Temporal)   Resp 16   Ht 154.9 cm (61\")   Wt 66.2 kg (146 lb)   SpO2 96%   BMI 27.59 kg/m²   Estimated body mass index is 27.59 kg/m² as calculated from the following:    Height " "as of this encounter: 154.9 cm (61\").    Weight as of this encounter: 66.2 kg (146 lb).  Facility age limit for growth %edwardo is 20 years.    Physical Exam  Vitals reviewed.   Constitutional:       Appearance: Normal appearance.   Cardiovascular:      Rate and Rhythm: Normal rate and regular rhythm.      Heart sounds: Normal heart sounds.   Pulmonary:      Effort: Pulmonary effort is normal.      Breath sounds: Normal breath sounds.   Abdominal:      General: Bowel sounds are normal.      Palpations: Abdomen is soft.      Tenderness: There is no right CVA tenderness or left CVA tenderness.   Musculoskeletal:      Cervical back: Neck supple.   Skin:     General: Skin is warm and dry.   Neurological:      Mental Status: She is alert and oriented to person, place, and time.               POCT Results (if applicable):  Results for orders placed or performed in visit on 11/06/24   TSH Rfx On Abnormal To Free T4    Collection Time: 11/06/24  3:09 PM    Specimen: Arm, Left; Blood    Blood  Release to mil   Result Value Ref Range    TSH 1.210 0.450 - 4.500 uIU/mL   Sedimentation Rate    Collection Time: 11/06/24  3:09 PM    Specimen: Arm, Left; Blood    Blood  Release to mil   Result Value Ref Range    Sed Rate 2 0 - 40 mm/hr   Comprehensive Metabolic Panel    Collection Time: 11/06/24  3:09 PM    Specimen: Arm, Left; Blood    Blood  Release to mil   Result Value Ref Range    Glucose 142 (H) 70 - 99 mg/dL    BUN 17 8 - 27 mg/dL    Creatinine 0.82 0.57 - 1.00 mg/dL    EGFR Result 73 >59 mL/min/1.73    BUN/Creatinine Ratio 21 12 - 28    Sodium 144 134 - 144 mmol/L    Potassium 4.2 3.5 - 5.2 mmol/L    Chloride 106 96 - 106 mmol/L    Total CO2 24 20 - 29 mmol/L    Calcium 9.9 8.7 - 10.3 mg/dL    Total Protein 6.4 6.0 - 8.5 g/dL    Albumin 4.5 3.8 - 4.8 g/dL    Globulin 1.9 1.5 - 4.5 g/dL    Total Bilirubin 0.3 0.0 - 1.2 mg/dL    Alkaline Phosphatase 57 44 - 121 IU/L    AST (SGOT) 19 0 - 40 IU/L    ALT (SGPT) 16 0 - 32 IU/L "   C-reactive Protein    Collection Time: 11/06/24  3:09 PM    Specimen: Arm, Left; Blood    Blood  Release to mil   Result Value Ref Range    C-Reactive Protein 2 0 - 10 mg/L   CBC & Differential    Collection Time: 11/06/24  3:09 PM    Specimen: Arm, Left; Blood    Blood  Release to mil   Result Value Ref Range    WBC 9.5 3.4 - 10.8 x10E3/uL    RBC 4.14 3.77 - 5.28 x10E6/uL    Hemoglobin 13.2 11.1 - 15.9 g/dL    Hematocrit 40.6 34.0 - 46.6 %    MCV 98 (H) 79 - 97 fL    MCH 31.9 26.6 - 33.0 pg    MCHC 32.5 31.5 - 35.7 g/dL    RDW 13.1 11.7 - 15.4 %    Platelets 281 150 - 450 x10E3/uL    Neutrophil Rel % 45 Not Estab. %    Lymphocyte Rel % 46 Not Estab. %    Monocyte Rel % 6 Not Estab. %    Eosinophil Rel % 2 Not Estab. %    Basophil Rel % 1 Not Estab. %    Neutrophils Absolute 4.3 1.4 - 7.0 x10E3/uL    Lymphocytes Absolute 4.4 (H) 0.7 - 3.1 x10E3/uL    Monocytes Absolute 0.6 0.1 - 0.9 x10E3/uL    Eosinophils Absolute 0.2 0.0 - 0.4 x10E3/uL    Basophils Absolute 0.1 0.0 - 0.2 x10E3/uL    Immature Granulocyte Rel % 0 Not Estab. %    Immature Grans Absolute 0.0 0.0 - 0.1 x10E3/uL   POC Urinalysis Dipstick    Collection Time: 11/07/24 10:03 AM    Specimen: Urine   Result Value Ref Range    Color Yellow Yellow, Straw, Dark Yellow, Kathy    Clarity, UA Cloudy (A) Clear    Glucose, UA Negative Negative mg/dL    Bilirubin Negative Negative    Ketones, UA Negative Negative    Specific Gravity  1.025 1.005 - 1.030    Blood, UA Negative Negative    pH, Urine 6.0 5.0 - 8.0    Protein, POC Negative Negative mg/dL    Urobilinogen, UA Normal Normal, 0.2 E.U./dL    Leukocytes Negative Negative    Nitrite, UA Negative Negative    Lot Number [id number]     Expiration Date 10-21-25             Assessment and Plan     Diagnoses and all orders for this visit:    1. Dysuria (Primary)  -     Cancel: POC Urinalysis Dipstick  -     Cancel: Urine Culture - Urine, Urine, Clean Catch; Future    2. Acute cystitis without  hematuria  Assessment & Plan:  Patient has longstanding history of chronic cystitis, having been evaluated by urology. Patient was last seen by Dr. Vasques in 2021 at which time he put her on a regimen of preventative methenamine 1 g twice daily. Patient has failed to comply with that regimen and is noted to have urinary tract infections approximately every 6 months at this point.  Patient has already had 2 tablets of Levaquin she found at home making collection of urine moot at this point.  Will send in full course of Levaquin for a completed regimen.  Advised if no improved      3. Type 2 diabetes mellitus with diabetic polyneuropathy, without long-term current use of insulin  Assessment & Plan:  Patient's glucose had been very well-controlled until approximately 3 months ago when she discontinued her metformin thinking perhaps it was contributing to recent issues with bowel incontinence. At my urging she did resume her metformin with improvement in her A1c, but stated once she resumed metformin she became very gassy and wanted to discontinue. Subsequently metformin was discontinued, she has now been taking glipizide XL 10 mg daily. It is not time to recheck her A1c but she states her at home glucose measurements have been significantly improved, generally running 1 teens to high 90s.  Patient continues to utilize gabapentin 300 mg 3 times daily with excellent benefit in regards to her neuropathy      4. Type 2 diabetes mellitus with diabetic neuropathy, without long-term current use of insulin  -     gabapentin (NEURONTIN) 300 MG capsule; Take 1 capsule by mouth 3 (Three) Times a Day.  Dispense: 90 capsule; Refill: 0    Other orders  -     levoFLOXacin (LEVAQUIN) 500 MG tablet; Take 1 tablet by mouth Daily for 3 days.  Dispense: 3 tablet; Refill: 0               Follow Up  No follow-ups on file.    Nimo Hinds, APRN

## 2024-12-26 NOTE — ASSESSMENT & PLAN NOTE
Patient has longstanding history of chronic cystitis, having been evaluated by urology. Patient was last seen by Dr. Vasques in 2021 at which time he put her on a regimen of preventative methenamine 1 g twice daily. Patient has failed to comply with that regimen and is noted to have urinary tract infections approximately every 6 months at this point.  Patient has already had 2 tablets of Levaquin she found at home making collection of urine moot at this point.  Will send in full course of Levaquin for a completed regimen.  Advised if no improved

## 2025-01-09 DIAGNOSIS — F41.9 ANXIETY AND DEPRESSION: ICD-10-CM

## 2025-01-09 DIAGNOSIS — F32.A ANXIETY AND DEPRESSION: ICD-10-CM

## 2025-01-09 RX ORDER — ALPRAZOLAM 0.5 MG
0.5 TABLET ORAL 2 TIMES DAILY PRN
Qty: 60 TABLET | Refills: 0 | Status: SHIPPED | OUTPATIENT
Start: 2025-01-09

## 2025-01-11 DIAGNOSIS — E11.42 TYPE 2 DIABETES MELLITUS WITH DIABETIC POLYNEUROPATHY, WITHOUT LONG-TERM CURRENT USE OF INSULIN: ICD-10-CM

## 2025-01-13 RX ORDER — GLIPIZIDE 10 MG/1
10 TABLET, FILM COATED, EXTENDED RELEASE ORAL DAILY
Qty: 30 TABLET | Refills: 0 | Status: SHIPPED | OUTPATIENT
Start: 2025-01-13

## 2025-01-16 RX ORDER — TOLTERODINE 4 MG/1
4 CAPSULE, EXTENDED RELEASE ORAL DAILY
Qty: 30 CAPSULE | Refills: 0 | Status: SHIPPED | OUTPATIENT
Start: 2025-01-16

## 2025-01-27 DIAGNOSIS — E11.40 TYPE 2 DIABETES MELLITUS WITH DIABETIC NEUROPATHY, WITHOUT LONG-TERM CURRENT USE OF INSULIN: ICD-10-CM

## 2025-01-27 RX ORDER — GABAPENTIN 300 MG/1
300 CAPSULE ORAL 3 TIMES DAILY
Qty: 90 CAPSULE | Refills: 0 | Status: SHIPPED | OUTPATIENT
Start: 2025-01-27

## 2025-02-07 DIAGNOSIS — E11.42 TYPE 2 DIABETES MELLITUS WITH DIABETIC POLYNEUROPATHY, WITHOUT LONG-TERM CURRENT USE OF INSULIN: ICD-10-CM

## 2025-02-07 RX ORDER — GLIPIZIDE 10 MG/1
10 TABLET, FILM COATED, EXTENDED RELEASE ORAL DAILY
Qty: 30 TABLET | Refills: 0 | Status: SHIPPED | OUTPATIENT
Start: 2025-02-07

## 2025-02-13 DIAGNOSIS — F41.9 ANXIETY AND DEPRESSION: ICD-10-CM

## 2025-02-13 DIAGNOSIS — F32.A ANXIETY AND DEPRESSION: ICD-10-CM

## 2025-02-13 RX ORDER — ALPRAZOLAM 0.5 MG
0.5 TABLET ORAL 2 TIMES DAILY PRN
Qty: 60 TABLET | Refills: 0 | Status: SHIPPED | OUTPATIENT
Start: 2025-02-13

## 2025-02-17 RX ORDER — TOLTERODINE 4 MG/1
4 CAPSULE, EXTENDED RELEASE ORAL DAILY
Qty: 30 CAPSULE | Refills: 0 | Status: SHIPPED | OUTPATIENT
Start: 2025-02-17

## 2025-03-03 DIAGNOSIS — E11.40 TYPE 2 DIABETES MELLITUS WITH DIABETIC NEUROPATHY, WITHOUT LONG-TERM CURRENT USE OF INSULIN: ICD-10-CM

## 2025-03-05 RX ORDER — GABAPENTIN 300 MG/1
300 CAPSULE ORAL 3 TIMES DAILY
Qty: 90 CAPSULE | Refills: 0 | Status: SHIPPED | OUTPATIENT
Start: 2025-03-05

## 2025-03-13 DIAGNOSIS — E11.42 TYPE 2 DIABETES MELLITUS WITH DIABETIC POLYNEUROPATHY, WITHOUT LONG-TERM CURRENT USE OF INSULIN: ICD-10-CM

## 2025-03-13 RX ORDER — GLIPIZIDE 10 MG/1
10 TABLET, FILM COATED, EXTENDED RELEASE ORAL DAILY
Qty: 30 TABLET | Refills: 0 | Status: SHIPPED | OUTPATIENT
Start: 2025-03-13

## 2025-03-18 DIAGNOSIS — F32.A ANXIETY AND DEPRESSION: ICD-10-CM

## 2025-03-18 DIAGNOSIS — F41.9 ANXIETY AND DEPRESSION: ICD-10-CM

## 2025-03-19 RX ORDER — ALPRAZOLAM 0.5 MG
0.5 TABLET ORAL 2 TIMES DAILY PRN
Qty: 60 TABLET | Refills: 0 | Status: SHIPPED | OUTPATIENT
Start: 2025-03-19

## 2025-03-21 ENCOUNTER — OFFICE VISIT (OUTPATIENT)
Dept: FAMILY MEDICINE CLINIC | Facility: CLINIC | Age: 80
End: 2025-03-21
Payer: MEDICARE

## 2025-03-21 VITALS
TEMPERATURE: 97.2 F | BODY MASS INDEX: 28.13 KG/M2 | HEIGHT: 61 IN | OXYGEN SATURATION: 97 % | SYSTOLIC BLOOD PRESSURE: 122 MMHG | HEART RATE: 74 BPM | DIASTOLIC BLOOD PRESSURE: 76 MMHG | RESPIRATION RATE: 16 BRPM | WEIGHT: 149 LBS

## 2025-03-21 DIAGNOSIS — F32.A ANXIETY AND DEPRESSION: Primary | ICD-10-CM

## 2025-03-21 DIAGNOSIS — I10 PRIMARY HYPERTENSION: Chronic | ICD-10-CM

## 2025-03-21 DIAGNOSIS — Z79.899 CONTROLLED SUBSTANCE AGREEMENT SIGNED: ICD-10-CM

## 2025-03-21 DIAGNOSIS — K59.09 CHRONIC CONSTIPATION: ICD-10-CM

## 2025-03-21 DIAGNOSIS — I25.10 CORONARY ARTERY DISEASE INVOLVING NATIVE CORONARY ARTERY OF NATIVE HEART WITHOUT ANGINA PECTORIS: ICD-10-CM

## 2025-03-21 DIAGNOSIS — F41.9 ANXIETY AND DEPRESSION: Primary | ICD-10-CM

## 2025-03-21 DIAGNOSIS — Z00.00 MEDICARE ANNUAL WELLNESS VISIT, SUBSEQUENT: ICD-10-CM

## 2025-03-21 DIAGNOSIS — E11.42 TYPE 2 DIABETES MELLITUS WITH DIABETIC POLYNEUROPATHY, WITHOUT LONG-TERM CURRENT USE OF INSULIN: ICD-10-CM

## 2025-03-21 DIAGNOSIS — E03.9 ACQUIRED HYPOTHYROIDISM: ICD-10-CM

## 2025-03-21 DIAGNOSIS — G47.33 OBSTRUCTIVE SLEEP APNEA: ICD-10-CM

## 2025-03-21 DIAGNOSIS — E78.5 DYSLIPIDEMIA: ICD-10-CM

## 2025-03-21 DIAGNOSIS — N32.81 OVERACTIVE BLADDER: ICD-10-CM

## 2025-03-21 DIAGNOSIS — L21.9 SEBORRHEIC DERMATITIS OF SCALP: ICD-10-CM

## 2025-03-21 DIAGNOSIS — E55.9 VITAMIN D DEFICIENCY: ICD-10-CM

## 2025-03-21 RX ORDER — CLOBETASOL PROPIONATE 0.05 G/100ML
SHAMPOO TOPICAL 2 TIMES DAILY
Status: CANCELLED | OUTPATIENT
Start: 2025-03-21

## 2025-03-21 RX ORDER — KETOCONAZOLE 20 MG/ML
SHAMPOO, SUSPENSION TOPICAL 2 TIMES WEEKLY
Qty: 120 ML | Refills: 1 | Status: SHIPPED | OUTPATIENT
Start: 2025-03-24

## 2025-03-21 NOTE — ASSESSMENT & PLAN NOTE
Patient had well-controlled glucose for a number of years until she discontinued her metformin, feeling as though it may be contributing to a episode of bowel incontinence, however turns out that was due to constipation.  At my urging she did resume her metformin with improvement in her A1c, but stated when she resumed metformin she became very gassy and self discontinued.  Patient had been taking glipizide XL 10 mg daily.  She does check her glucose daily a.m. fasting with readings ranging 1 77-2 22 range.  She continues to utilize gabapentin 300 mg 3 times daily with excellent benefit in regards to her neuropathies.  Patient states she has appointment for eye exam next week..  Patient's A1c is 8.3%.  Given she is taking an intolerance to metformin will initiate Rybelsus 3 mg in the morning as well as continue glipizide 10 mg daily.  Patient should follow-up in 4 weeks to evaluate tolerance

## 2025-03-21 NOTE — PROGRESS NOTES
The ABCs of the Annual Wellness Visit  Subsequent Medicare Wellness Visit    Chief Complaint   Patient presents with    Medicare Wellness-subsequent      Subjective    History of Present Illness:  Earline Santacruz is a 80 y.o. female who presents for a Subsequent Medicare Wellness Visit.    The following portions of the patient's history were reviewed and   updated as appropriate: allergies, current medications, past family history, past medical history, past social history, past surgical history, and problem list.    Compared to one year ago, the patient feels her physical   health is worse.    Compared to one year ago, the patient feels her mental   health is the same.    Recent Hospitalizations:  She was not admitted to the hospital during the last year.       Current Medical Providers:  Patient Care Team:  Nimo Hinds APRN as PCP - General (Family Medicine)  Tata Albert MD as Consulting Physician (Cardiology)    Outpatient Medications Prior to Visit   Medication Sig Dispense Refill    ALPRAZolam (XANAX) 0.5 MG tablet Take 1 tablet by mouth twice daily as needed for anxiety 60 tablet 0    aspirin 81 MG EC tablet Take 1 tablet by mouth Daily. 90 tablet 3    coenzyme Q10 100 MG capsule Take 1 capsule by mouth Daily. 30 capsule 2    fluconazole (Diflucan) 150 MG tablet Take one tablet on day one and repeat on day three 2 tablet 0    gabapentin (NEURONTIN) 300 MG capsule TAKE 1 CAPSULE BY MOUTH THREE TIMES DAILY 90 capsule 0    glipizide (GLUCOTROL XL) 10 MG 24 hr tablet Take 1 tablet by mouth once daily 30 tablet 0    levothyroxine (SYNTHROID, LEVOTHROID) 50 MCG tablet Take 1 tablet by mouth Daily. 90 tablet 0    pantoprazole (PROTONIX) 20 MG EC tablet Take 2 tablets by mouth Daily.      rosuvastatin (CRESTOR) 20 MG tablet Take 1 tablet by mouth Daily. Need lab work 90 tablet 2    tolterodine LA (DETROL LA) 4 MG 24 hr capsule Take 1 capsule by mouth once daily 30 capsule 0    metFORMIN (GLUCOPHAGE)  500 MG tablet TAKE 1 TABLET BY MOUTH 2 (TWO) TIMES A DAY WITH MEALS. 180 tablet 0    furosemide (Lasix) 20 MG tablet Take 1 tablet by mouth Daily for 7 days. 7 tablet 0     No facility-administered medications prior to visit.       No opioid medication identified on active medication list. I have reviewed chart for other potential  high risk medication/s and harmful drug interactions in the elderly.        Aspirin is on active medication list. Aspirin use is indicated based on review of current medical condition/s. Pros and cons of this therapy have been discussed today. Benefits of this medication outweigh potential harm.  Patient has been encouraged to continue taking this medication.  .      Patient Active Problem List   Diagnosis    CAD (coronary artery disease)    Dyslipidemia    Type 2 diabetes mellitus with diabetic polyneuropathy    Hypothyroidism    Chronic pain    Pemphigoid    Obstructive sleep apnea    Gastroesophageal reflux disease without esophagitis    Hypertension    Zoster    Facial paralysis/[place] palsy    Urinary frequency    Chronic cystitis    Anxiety and depression    Medicare annual wellness visit, subsequent    Acute non-recurrent frontal sinusitis    Viral syndrome    Bronchitis    Chronic constipation    Sensorineural hearing loss (SNHL) of right ear    Lumbar strain    Overactive bladder    Recurrent cystitis    Bilateral lower extremity edema    Acute cystitis without hematuria    Seborrheic dermatitis of scalp     Advance Care Planning  Advance Directive is not on file.  ACP discussion was held with the patient during this visit. Patient does not have an advance directive, information provided.    Review of Systems   Constitutional:  Negative for appetite change, chills, fatigue and fever.   Eyes:  Negative for visual disturbance.   Respiratory:  Negative for cough, chest tightness and shortness of breath.    Cardiovascular:  Negative for chest pain, palpitations and leg swelling.  "  Gastrointestinal:  Negative for abdominal pain, constipation, diarrhea, nausea and vomiting.   Endocrine: Negative for polydipsia and polyuria.   Genitourinary:  Negative for difficulty urinating, dysuria, frequency, hematuria and urgency.   Musculoskeletal:  Negative for arthralgias and myalgias.   Skin:  Positive for rash.   Neurological:  Negative for dizziness, weakness and light-headedness.   Psychiatric/Behavioral:  Negative for agitation, self-injury, sleep disturbance and suicidal ideas. The patient is not nervous/anxious and is not hyperactive.         Objective    Vitals:    03/21/25 1002   BP: 122/76   BP Location: Left arm   Patient Position: Sitting   Cuff Size: Adult   Pulse: 74   Resp: 16   Temp: 97.2 °F (36.2 °C)   TempSrc: Temporal   SpO2: 97%   Weight: 67.6 kg (149 lb)   Height: 154.9 cm (61\")     Estimated body mass index is 28.15 kg/m² as calculated from the following:    Height as of this encounter: 154.9 cm (61\").    Weight as of this encounter: 67.6 kg (149 lb).           Does the patient have evidence of cognitive impairment? No    Physical Exam  Vitals reviewed.   Constitutional:       Appearance: Normal appearance.   HENT:      Head: Normocephalic.      Right Ear: Tympanic membrane, ear canal and external ear normal.      Left Ear: Tympanic membrane, ear canal and external ear normal.      Nose: Nose normal.      Mouth/Throat:      Mouth: Mucous membranes are moist. Mucous membranes are dry.      Pharynx: Oropharynx is clear.   Eyes:      Conjunctiva/sclera: Conjunctivae normal.   Cardiovascular:      Rate and Rhythm: Normal rate and regular rhythm.      Pulses: Normal pulses.      Heart sounds: Normal heart sounds.   Pulmonary:      Effort: Pulmonary effort is normal.      Breath sounds: Normal breath sounds.   Abdominal:      General: Bowel sounds are normal.      Palpations: Abdomen is soft.   Genitourinary:     Comments: Patient declines at this time  Musculoskeletal:         General: " Normal range of motion.      Cervical back: Neck supple.   Skin:     General: Skin is warm and dry.      Capillary Refill: Capillary refill takes less than 2 seconds.      Comments: Dry, erythematous rash to scalp noted.  Accompanied by moderate amount of flaking   Neurological:      Mental Status: She is alert and oriented to person, place, and time.   Psychiatric:         Mood and Affect: Mood normal.         Behavior: Behavior normal.       Lab Results   Component Value Date    CHLPL 172 03/21/2025    TRIG 278 (H) 03/21/2025    HDL 42 03/21/2025    LDL 84 03/21/2025    VLDL 46 (H) 03/21/2025    HGBA1C 8.3 (H) 03/21/2025        ECG 12 Lead    Date/Time: 3/21/2025 5:16 PM  Performed by: Nimo Hinds APRN    Authorized by: Nimo Hinds APRN  Comparison: compared with previous ECG from 3/6/2024  Similar to previous ECG  Rhythm: sinus rhythm  Rate: normal  ST Segments: ST segments normal  T Waves: T waves normal  QRS axis: normal  Other: no other findings    Clinical impression: normal ECG             HEALTH RISK ASSESSMENT    Smoking Status:  Social History     Tobacco Use   Smoking Status Never    Passive exposure: Never   Smokeless Tobacco Never     Alcohol Consumption:  Social History     Substance and Sexual Activity   Alcohol Use No     Fall Risk Screen:    STEADI Fall Risk Assessment was completed, and patient is at LOW risk for falls.Assessment completed on:3/21/2025    Depression Screening:      3/21/2025    10:02 AM   PHQ-2/PHQ-9 Depression Screening   Little interest or pleasure in doing things Not at all   Feeling down, depressed, or hopeless Not at all   How difficult have these problems made it for you to do your work, take care of things at home, or get along with other people? Not difficult at all       Health Habits and Functional and Cognitive Screening:      3/21/2025    10:03 AM   Functional & Cognitive Status   Do you have difficulty preparing food and eating? No   Do you have difficulty  bathing yourself, getting dressed or grooming yourself? No   Do you have difficulty using the toilet? No   Do you have difficulty moving around from place to place? No   Do you have trouble with steps or getting out of a bed or a chair? No   Current Diet Other   Dental Exam Up to date   Eye Exam Up to date   Exercise (times per week) 0 times per week   Current Exercises Include No Regular Exercise   Do you need help using the phone?  No   Are you deaf or do you have serious difficulty hearing?  No   Do you need help to go to places out of walking distance? No   Do you need help shopping? No   Do you need help preparing meals?  No   Do you need help with housework?  No   Do you need help with laundry? No   Do you need help taking your medications? No   Do you need help managing money? No   Do you ever drive or ride in a car without wearing a seat belt? No   Have you felt unusual stress, anger or loneliness in the last month? No   Who do you live with? Alone   If you need help, do you have trouble finding someone available to you? No   Have you been bothered in the last four weeks by sexual problems? No   Do you have difficulty concentrating, remembering or making decisions? No       Age-appropriate Screening Schedule:  Refer to the list below for future screening recommendations based on patient's age, sex and/or medical conditions. Orders for these recommended tests are listed in the plan section. The patient has been provided with a written plan.    Health Maintenance   Topic Date Due    URINE MICROALBUMIN-CREATININE RATIO (uACR)  Never done    TDAP/TD VACCINES (1 - Tdap) Never done    RSV Vaccine - Adults (1 - 1-dose 75+ series) Never done    DIABETIC EYE EXAM  03/03/2024    ANNUAL WELLNESS VISIT  06/30/2024    COVID-19 Vaccine (2 - 2024-25 season) 09/01/2024    INFLUENZA VACCINE  03/31/2025 (Originally 7/1/2024)    Pneumococcal Vaccine 50+ (2 of 2 - PPSV23) 06/07/2025 (Originally 12/12/2015)    DXA SCAN   06/10/2025 (Originally 1945)    HEMOGLOBIN A1C  09/21/2025    LIPID PANEL  03/21/2026    BMI FOLLOWUP  03/21/2026    COLORECTAL CANCER SCREENING  09/29/2030    HEPATITIS C SCREENING  Completed    ZOSTER VACCINE  Completed              Assessment & Plan   CMS Preventative Services Quick Reference  Risk Factors Identified During Encounter  None Identified  The above risks/problems have been discussed with the patient.  Follow up actions/plans if indicated are seen below in the Assessment/Plan Section.  Pertinent information has been shared with the patient in the After Visit Summary.    Diagnoses and all orders for this visit:    1. Anxiety and depression (Primary)  Assessment & Plan:  Patient continues to love her job as a  at [employer].  She states this has been very good for her mental health, not really struggling with any anxiety or depression currently. She has discontinued her antidepressant therapy. She has a longstanding pattern of anxiety with intermittent panic for which she has utilized alprazolam 0.5 mg twice daily as needed. Trev report reviewed and satisfactory. Will continue current very sparing dose of alprazolam for panic as needed       2. Coronary artery disease involving native coronary artery of native heart without angina pectoris  Assessment & Plan:  history of coronary artery disease, having undergone cardiac cath in April 2015 with signs of significant coronary artery disease involving the second diagonal branch which was ultimately too small for intervention. She had been lost to follow-up for quite some time until approximately 9 months ago at which time I referred her to Dr. Albert due to some subscapular discomfort and feelings of chronic indigestion. She underwent low risk stress test at that time.       3. Chronic constipation  Assessment & Plan:  Patient's bowels well-controlled with chronic regimen of daily MiraLAX.      4. Dyslipidemia  Assessment & Plan:  Patient  currently utilizing rosuvastatin 20 mg daily, rechecking lipids today.    Orders:  -     Lipid Panel; Future  -     Lipid Panel    5. Primary hypertension  Assessment & Plan:  Patient has diagnosis of hypertension but has not required any use of antihypertensive GYN medications in several years, BP remains well-controlled, 122/76 in office today    Orders:  -     ECG 12 Lead    6. Acquired hypothyroidism  Assessment & Plan:  Patient currently utilizing levothyroxine 50 mcg daily with continued euthyroid on lab at last check, rechecking thyroid levels today    Orders:  -     TSH Rfx On Abnormal To Free T4; Future  -     TSH Rfx On Abnormal To Free T4    7. Medicare annual wellness visit, subsequent  -     CBC & Differential; Future  -     Comprehensive Metabolic Panel; Future  -     Hepatitis C Antibody; Future  -     Hepatitis C Antibody  -     Comprehensive Metabolic Panel  -     CBC & Differential    8. Obstructive sleep apnea  Assessment & Plan:  Patient's diagnosis was mild, she does not utilize CPAP therapy      9. Overactive bladder  Assessment & Plan:  Patient has had longstanding issue with overactive bladder, though now worsening to the point she is experiencing frequent incontinence and wearing incontinence pads. She has been evaluated for bladder prolapse without abnormal findings.  Initiated Detrol 2 mg daily last visit with benefits, however she states she will be incontinent immediately upon awakening but improves throughout the day.  Patient advised she could increase to 4 mg daily and continue to monitor       10. Type 2 diabetes mellitus with diabetic polyneuropathy, without long-term current use of insulin  Assessment & Plan:  Patient had well-controlled glucose for a number of years until she discontinued her metformin, feeling as though it may be contributing to a episode of bowel incontinence, however turns out that was due to constipation.  At my urging she did resume her metformin with  improvement in her A1c, but stated when she resumed metformin she became very gassy and self discontinued.  Patient had been taking glipizide XL 10 mg daily.  She does check her glucose daily a.m. fasting with readings ranging 1 77-2 22 range.  She continues to utilize gabapentin 300 mg 3 times daily with excellent benefit in regards to her neuropathies.  Patient states she has appointment for eye exam next week..  Patient's A1c is 8.3%.  Given she is taking an intolerance to metformin will initiate Rybelsus 3 mg in the morning as well as continue glipizide 10 mg daily.  Patient should follow-up in 4 weeks to evaluate tolerance    Orders:  -     Hemoglobin A1c; Future  -     Cancel: POC Albumin/Creatinine Ratio Urine  -     Microalbumin / Creatinine Urine Ratio - Urine, Clean Catch; Future  -     Microalbumin / Creatinine Urine Ratio - Urine, Clean Catch  -     Hemoglobin A1c  -     Semaglutide 3 MG tablet; Take 1 tablet by mouth Every Morning.  Dispense: 90 tablet; Refill: 0    11. Vitamin D deficiency  -     Vitamin D,25-Hydroxy; Future  -     Vitamin D,25-Hydroxy    12. Controlled substance agreement signed  -     Urine Drug Screen - Urine, Clean Catch; Future  -     Urine Drug Screen - Urine, Clean Catch    13. Seborrheic dermatitis of scalp  Assessment & Plan:  Patient has been experiencing severely itchy and flaky scalp, has been utilizing Selsun Blue over-the-counter without any improvement in her symptoms.  She is being given prescription for ketoconazole shampoo in office today to be utilized twice weekly.      Other orders  -     ketoconazole (NIZORAL) 2 % shampoo; Apply  topically to the appropriate area as directed 2 (Two) Times a Week.  Dispense: 120 mL; Refill: 1        Follow Up:   Return in about 3 months (around 6/21/2025) for Next scheduled follow up.     An After Visit Summary and PPPS were made available to the patient.

## 2025-03-21 NOTE — PROGRESS NOTES
Venipuncture Blood Specimen Collection  Venipuncture performed in left arm by Reyna Mojica MA with good hemostasis. Patient tolerated the procedure well without complications.   03/21/25   Reyna Mojica MA

## 2025-03-22 LAB
25(OH)D3+25(OH)D2 SERPL-MCNC: 21.4 NG/ML (ref 30–100)
ALBUMIN SERPL-MCNC: 4.4 G/DL (ref 3.8–4.8)
ALP SERPL-CCNC: 68 IU/L (ref 44–121)
ALT SERPL-CCNC: 17 IU/L (ref 0–32)
AMPHETAMINES UR QL SCN: NEGATIVE NG/ML
AST SERPL-CCNC: 20 IU/L (ref 0–40)
BARBITURATES UR QL SCN: NEGATIVE NG/ML
BASOPHILS # BLD AUTO: 0.1 X10E3/UL (ref 0–0.2)
BASOPHILS NFR BLD AUTO: 1 %
BENZODIAZ UR QL SCN: POSITIVE NG/ML
BILIRUB SERPL-MCNC: 0.3 MG/DL (ref 0–1.2)
BUN SERPL-MCNC: 15 MG/DL (ref 8–27)
BUN/CREAT SERPL: 22 (ref 12–28)
BZE UR QL SCN: NEGATIVE NG/ML
CALCIUM SERPL-MCNC: 9.4 MG/DL (ref 8.7–10.3)
CANNABINOIDS UR QL SCN: NEGATIVE NG/ML
CHLORIDE SERPL-SCNC: 104 MMOL/L (ref 96–106)
CHOLEST SERPL-MCNC: 172 MG/DL (ref 100–199)
CO2 SERPL-SCNC: 24 MMOL/L (ref 20–29)
CREAT SERPL-MCNC: 0.69 MG/DL (ref 0.57–1)
CREAT UR-MCNC: 122.3 MG/DL (ref 20–300)
EGFRCR SERPLBLD CKD-EPI 2021: 88 ML/MIN/1.73
EOSINOPHIL # BLD AUTO: 0.3 X10E3/UL (ref 0–0.4)
EOSINOPHIL NFR BLD AUTO: 4 %
ERYTHROCYTE [DISTWIDTH] IN BLOOD BY AUTOMATED COUNT: 12.4 % (ref 11.7–15.4)
GLOBULIN SER CALC-MCNC: 2 G/DL (ref 1.5–4.5)
GLUCOSE SERPL-MCNC: 192 MG/DL (ref 70–99)
HBA1C MFR BLD: 8.3 % (ref 4.8–5.6)
HCT VFR BLD AUTO: 45.3 % (ref 34–46.6)
HCV IGG SERPL QL IA: NON REACTIVE
HDLC SERPL-MCNC: 42 MG/DL
HGB BLD-MCNC: 15.2 G/DL (ref 11.1–15.9)
IMM GRANULOCYTES # BLD AUTO: 0 X10E3/UL (ref 0–0.1)
IMM GRANULOCYTES NFR BLD AUTO: 0 %
LABORATORY COMMENT REPORT: ABNORMAL
LDLC SERPL CALC-MCNC: 84 MG/DL (ref 0–99)
LYMPHOCYTES # BLD AUTO: 3.7 X10E3/UL (ref 0.7–3.1)
LYMPHOCYTES NFR BLD AUTO: 45 %
MCH RBC QN AUTO: 32.3 PG (ref 26.6–33)
MCHC RBC AUTO-ENTMCNC: 33.6 G/DL (ref 31.5–35.7)
MCV RBC AUTO: 96 FL (ref 79–97)
METHADONE UR QL SCN: NEGATIVE NG/ML
MONOCYTES # BLD AUTO: 0.5 X10E3/UL (ref 0.1–0.9)
MONOCYTES NFR BLD AUTO: 6 %
NEUTROPHILS # BLD AUTO: 3.6 X10E3/UL (ref 1.4–7)
NEUTROPHILS NFR BLD AUTO: 44 %
OPIATES UR QL SCN: NEGATIVE NG/ML
OXYCODONE+OXYMORPHONE UR QL SCN: NEGATIVE NG/ML
PCP UR QL: NEGATIVE NG/ML
PH UR: 5.5 [PH] (ref 4.5–8.9)
PLATELET # BLD AUTO: 298 X10E3/UL (ref 150–450)
POTASSIUM SERPL-SCNC: 4.1 MMOL/L (ref 3.5–5.2)
PROPOXYPH UR QL SCN: NEGATIVE NG/ML
PROT SERPL-MCNC: 6.4 G/DL (ref 6–8.5)
RBC # BLD AUTO: 4.71 X10E6/UL (ref 3.77–5.28)
SODIUM SERPL-SCNC: 142 MMOL/L (ref 134–144)
TRIGL SERPL-MCNC: 278 MG/DL (ref 0–149)
TSH SERPL DL<=0.005 MIU/L-ACNC: 1.59 UIU/ML (ref 0.45–4.5)
VLDLC SERPL CALC-MCNC: 46 MG/DL (ref 5–40)
WBC # BLD AUTO: 8.2 X10E3/UL (ref 3.4–10.8)

## 2025-03-23 NOTE — ASSESSMENT & PLAN NOTE
history of coronary artery disease, having undergone cardiac cath in April 2015 with signs of significant coronary artery disease involving the second diagonal branch which was ultimately too small for intervention. She had been lost to follow-up for quite some time until approximately 9 months ago at which time I referred her to Dr. Albert due to some subscapular discomfort and feelings of chronic indigestion. She underwent low risk stress test at that time.

## 2025-03-23 NOTE — ASSESSMENT & PLAN NOTE
Patient has been experiencing severely itchy and flaky scalp, has been utilizing Selsun Blue over-the-counter without any improvement in her symptoms.  She is being given prescription for ketoconazole shampoo in office today to be utilized twice weekly.

## 2025-03-23 NOTE — ASSESSMENT & PLAN NOTE
Patient currently utilizing levothyroxine 50 mcg daily with continued euthyroid on lab at last check, rechecking thyroid levels today

## 2025-03-23 NOTE — ASSESSMENT & PLAN NOTE
Patient has diagnosis of hypertension but has not required any use of antihypertensive GYN medications in several years, BP remains well-controlled, 122/76 in office today

## 2025-03-23 NOTE — ASSESSMENT & PLAN NOTE
Patient continues to love her job as a  at [employer].  She states this has been very good for her mental health, not really struggling with any anxiety or depression currently. She has discontinued her antidepressant therapy. She has a longstanding pattern of anxiety with intermittent panic for which she has utilized alprazolam 0.5 mg twice daily as needed. Trev report reviewed and satisfactory. Will continue current very sparing dose of alprazolam for panic as needed

## 2025-03-25 ENCOUNTER — TELEPHONE (OUTPATIENT)
Dept: FAMILY MEDICINE CLINIC | Facility: CLINIC | Age: 80
End: 2025-03-25
Payer: MEDICARE

## 2025-03-25 NOTE — TELEPHONE ENCOUNTER
Caller: Zhane Santacruz    Relationship: Self    Best call back number: [phone]     What is the best time to reach you: ANYTIME    Who are you requesting to speak with (clinical staff, provider,  specific staff member): CLINICAL STAFF    Do you know the name of the person who called: ZHANE    What was the call regarding: PATIENT WOULD LIKE TO KNOW THE RESULTS OF HER MOST RECENT BLOOD WORK    PLEASE ADVISE

## 2025-03-25 NOTE — TELEPHONE ENCOUNTER
Informed pt Nimo is out of office today and I will have her review them tomorrow and call her with them.

## 2025-03-26 RX ORDER — ERGOCALCIFEROL 1.25 MG/1
50000 CAPSULE, LIQUID FILLED ORAL WEEKLY
Qty: 5 CAPSULE | Refills: 3 | Status: SHIPPED | OUTPATIENT
Start: 2025-03-26

## 2025-03-26 NOTE — TELEPHONE ENCOUNTER
Please let lisha know I reviewed her labs. Her A1C was 8.3%, we started her on the rybelsus 3mg daily during her visit and she is continuing her glipizide 10mg daily. I want her to check her sugar daily, come in 4 weeks and let me review her log. Her vitamin d level was low so I'm going to send a once weekly high dose vitamin d supplement for her. Her triglycerdies were high but that is likely due to her glucose running high and should improve as her sugar improves. We will recheck her lipids in three months too.       Called ands spoke with patient and advised her of lab results and she verbalized understanding

## 2025-03-31 RX ORDER — TOLTERODINE 4 MG/1
4 CAPSULE, EXTENDED RELEASE ORAL DAILY
Qty: 30 CAPSULE | Refills: 0 | Status: SHIPPED | OUTPATIENT
Start: 2025-03-31

## 2025-04-01 DIAGNOSIS — E11.42 TYPE 2 DIABETES MELLITUS WITH DIABETIC POLYNEUROPATHY, WITHOUT LONG-TERM CURRENT USE OF INSULIN: ICD-10-CM

## 2025-04-01 RX ORDER — GLIPIZIDE 10 MG/1
10 TABLET, FILM COATED, EXTENDED RELEASE ORAL DAILY
Qty: 30 TABLET | Refills: 0 | Status: SHIPPED | OUTPATIENT
Start: 2025-04-01

## 2025-04-10 ENCOUNTER — OFFICE VISIT (OUTPATIENT)
Dept: CARDIOLOGY | Facility: CLINIC | Age: 80
End: 2025-04-10
Payer: MEDICARE

## 2025-04-10 VITALS
HEART RATE: 95 BPM | HEIGHT: 61 IN | OXYGEN SATURATION: 95 % | DIASTOLIC BLOOD PRESSURE: 78 MMHG | SYSTOLIC BLOOD PRESSURE: 136 MMHG | BODY MASS INDEX: 27.32 KG/M2 | WEIGHT: 144.7 LBS

## 2025-04-10 DIAGNOSIS — E78.2 MIXED HYPERLIPIDEMIA: ICD-10-CM

## 2025-04-10 DIAGNOSIS — R60.0 BILATERAL LOWER EXTREMITY EDEMA: ICD-10-CM

## 2025-04-10 DIAGNOSIS — I25.10 CORONARY ARTERY DISEASE INVOLVING NATIVE CORONARY ARTERY OF NATIVE HEART WITHOUT ANGINA PECTORIS: Primary | ICD-10-CM

## 2025-04-10 DIAGNOSIS — K21.9 GASTROESOPHAGEAL REFLUX DISEASE WITHOUT ESOPHAGITIS: ICD-10-CM

## 2025-04-10 PROCEDURE — 1160F RVW MEDS BY RX/DR IN RCRD: CPT | Performed by: NURSE PRACTITIONER

## 2025-04-10 PROCEDURE — 3078F DIAST BP <80 MM HG: CPT | Performed by: NURSE PRACTITIONER

## 2025-04-10 PROCEDURE — 3075F SYST BP GE 130 - 139MM HG: CPT | Performed by: NURSE PRACTITIONER

## 2025-04-10 PROCEDURE — 1159F MED LIST DOCD IN RCRD: CPT | Performed by: NURSE PRACTITIONER

## 2025-04-10 PROCEDURE — 99214 OFFICE O/P EST MOD 30 MIN: CPT | Performed by: NURSE PRACTITIONER

## 2025-04-10 RX ORDER — PANTOPRAZOLE SODIUM 20 MG/1
40 TABLET, DELAYED RELEASE ORAL DAILY
Qty: 30 TABLET | Refills: 1 | Status: SHIPPED | OUTPATIENT
Start: 2025-04-10

## 2025-04-10 NOTE — ASSESSMENT & PLAN NOTE
She has a known history of having small vessel coronary artery disease.  Her disease was in a vessel that was too small for intervention.    She completed a stress echo 5/29/2024 and this is consistent with a low risk study for myocardial ischemia.    She denies any chest pain or shortness of breath.  Her symptoms are stable.    She is on medical management including and we will continue:  -Aspirin 81 mg daily  -Statin    Is also encouraged good blood pressure control and good diabetes control

## 2025-04-10 NOTE — PROGRESS NOTES
[facility] Provider Note     Date:   04/10/2025   Name: Earline Santacruz  :   1945  PCP: Nimo Hinds APRN    Chief Complaint   Patient presents with    Coronary Artery Disease       Subjective     History of Present Illness  Earline Santacruz is a 80 y.o. female who presents today for follow-up on her known history of small vessel coronary artery disease.    She reports that her complaint is that she is having bilateral lower extremity edema.  She reports she has tried to use compression socks but it just does not seem very helpful.  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  She is standing 8 hours a day 4 days/week.  She reports the edema is worse on the days that she works.  She reports even on her day off that she does have some edema in her lower legs.  But she reports it is much improved on her days off.    He reports that she loves her job at [employer] as the door  and she does not want to quit just because her legs are swelling.  She reports that she does stop it [employer] doughnuts and has a chocolate doughnut almost every day.  We discussed this this may not be helpful for her diabetes and that diabetes out-of-control can result in bilateral lower extremity edema.  She was not aware no she will work harder for better diabetic control.    He also reports that she has a history of reflux and GERD and has been on medication in the past for it.  She has been off of her medication and she is having a reoccurrence of symptoms.  She does report that it is acid and it is a burning sensation in the back of her throat.  She denies any chest pain or shortness of air.    She reports that sometimes she gets dizzy when she rolls over in the bed.  She is not sure if this is an ear problem or vertigo but it is a very mild complaint and only happens change of position in bed.  Reports that she bends over frequently taking care of her 4 dogs but she does not  report dizziness like when bending over and standing back up.        Cardiac history  1.  Left heart cath 4/21/2015  FINAL IMPRESSIONS:   1.  There was significant coronary artery disease that involved the second  diagonal branch, but this was a 0.5 mm in diameter and too small for intervention.   2.  Based on the mild calcification noted in multiple arteries proximally,there was a fair amount of plaque burden within the walls of the vessel, although there was minimal disease noted within the vessel itself.   3.  Mildly elevated left ventricular end-diastolic pressure at the time of cardiac catheterization with evidence of diastolic dysfunction by echocardiogram.    Stress echo test 5/29/2024  - Normal stress echo consistent with a low risk study for myocardial ischemia     Reports Denies   Chest Pain [] [x]   Shortness of Air [] [x]   Palpitations [] [x]   Edema [x] []   Dizziness [x] []   Syncope [] [x]       Allergies   Allergen Reactions    Sulfa Antibiotics Headache     Headaches       Current Outpatient Medications:     ALPRAZolam (XANAX) 0.5 MG tablet, Take 1 tablet by mouth twice daily as needed for anxiety, Disp: 60 tablet, Rfl: 0    aspirin 81 MG EC tablet, Take 1 tablet by mouth Daily., Disp: 90 tablet, Rfl: 3    coenzyme Q10 100 MG capsule, Take 1 capsule by mouth Daily., Disp: 30 capsule, Rfl: 2    gabapentin (NEURONTIN) 300 MG capsule, TAKE 1 CAPSULE BY MOUTH THREE TIMES DAILY, Disp: 90 capsule, Rfl: 0    glipizide (GLUCOTROL XL) 10 MG 24 hr tablet, Take 1 tablet by mouth once daily, Disp: 30 tablet, Rfl: 0    ketoconazole (NIZORAL) 2 % shampoo, Apply  topically to the appropriate area as directed 2 (Two) Times a Week., Disp: 120 mL, Rfl: 1    levothyroxine (SYNTHROID, LEVOTHROID) 50 MCG tablet, Take 1 tablet by mouth Daily., Disp: 90 tablet, Rfl: 0    pantoprazole (PROTONIX) 20 MG EC tablet, Take 2 tablets by mouth Daily., Disp: 30 tablet, Rfl: 1    rosuvastatin (CRESTOR) 20 MG tablet, Take 1 tablet  "by mouth Daily. Need lab work, Disp: 90 tablet, Rfl: 2    Semaglutide 3 MG tablet, Take 1 tablet by mouth Every Morning., Disp: 90 tablet, Rfl: 0    tolterodine LA (DETROL LA) 4 MG 24 hr capsule, Take 1 capsule by mouth once daily, Disp: 30 capsule, Rfl: 0    vitamin D (ERGOCALCIFEROL) 1.25 MG (44630 UT) capsule capsule, Take 1 capsule by mouth 1 (One) Time Per Week., Disp: 5 capsule, Rfl: 3    Past Medical History:   Diagnosis Date    Diabetes mellitus     Hyperlipidemia     Hypertension     Panic attacks       Past Surgical History:   Procedure Laterality Date    APPENDECTOMY      BREAST LUMPECTOMY      RIGHT     CATARACT EXTRACTION, BILATERAL      HAND SURGERY      LEFT     TOTAL ABDOMINAL HYSTERECTOMY       Family History   Problem Relation Age of Onset    No Known Problems Mother     No Known Problems Father      Social History     Socioeconomic History    Marital status:    Tobacco Use    Smoking status: Never     Passive exposure: Never    Smokeless tobacco: Never   Vaping Use    Vaping status: Never Used   Substance and Sexual Activity    Alcohol use: No    Drug use: No    Sexual activity: Defer       Objective     Vital Signs:  /78 (BP Location: Right arm, Patient Position: Sitting, Cuff Size: Adult)   Pulse 95   Ht 154.9 cm (61\")   Wt 65.6 kg (144 lb 11.2 oz)   SpO2 95%   BMI 27.34 kg/m²   Estimated body mass index is 27.34 kg/m² as calculated from the following:    Height as of this encounter: 154.9 cm (61\").    Weight as of this encounter: 65.6 kg (144 lb 11.2 oz).               Physical Exam  Constitutional:       Appearance: Normal appearance. She is well-developed.   HENT:      Head: Normocephalic and atraumatic.      Nose: Nose normal.      Mouth/Throat:      Mouth: Mucous membranes are moist.   Eyes:      General: No scleral icterus.     Pupils: Pupils are equal, round, and reactive to light.   Neck:      Vascular: No carotid bruit.   Cardiovascular:      Rate and Rhythm: Normal " rate and regular rhythm.      Pulses: Normal pulses.           Radial pulses are 2+ on the right side and 2+ on the left side.        Dorsalis pedis pulses are 2+ on the right side and 2+ on the left side.        Posterior tibial pulses are 2+ on the right side and 2+ on the left side.      Heart sounds: Normal heart sounds. No murmur heard.  Pulmonary:      Effort: Pulmonary effort is normal.      Breath sounds: Normal breath sounds. No wheezing or rhonchi.   Abdominal:      General: Bowel sounds are normal.   Musculoskeletal:      Right lower leg: Right lower leg edema: trace.      Left lower leg: Left lower leg edema: trace.   Skin:     General: Skin is warm and dry.      Capillary Refill: Capillary refill takes less than 2 seconds.      Coloration: Skin is not cyanotic.      Nails: There is no clubbing.   Neurological:      Mental Status: She is alert and oriented to person, place, and time.      Motor: No weakness.      Gait: Gait normal.   Psychiatric:         Mood and Affect: Mood normal.         Behavior: Behavior normal. Behavior is cooperative.         Thought Content: Thought content normal.         Cognition and Memory: Memory normal.           Labs reviewed: 3/21/2025 CBC, CMP, hemoglobin A1c, lipid panel and TSH, vitamin D          Assessment and Plan     Diagnoses and all orders for this visit:    1. Coronary artery disease involving native coronary artery of native heart without angina pectoris (Primary)  Assessment & Plan:  She has a known history of having small vessel coronary artery disease.  Her disease was in a vessel that was too small for intervention.    She completed a stress echo 5/29/2024 and this is consistent with a low risk study for myocardial ischemia.    She denies any chest pain or shortness of breath.  Her symptoms are stable.    She is on medical management including and we will continue:  -Aspirin 81 mg daily  -Statin    Is also encouraged good blood pressure control and good  diabetes control      2. Gastroesophageal reflux disease without esophagitis  Assessment & Plan:  Reports had been on Protonix 40 mg daily in the past but felt like she had received the full benefit and was off this medication.    Now she reports that she is having some reflux and this is described as acid in her esophagus with the burning sensation.    Plan:    Restart Protonix 20 mg    Check with pharmacist for best time of day to take this medication    Let her PCP know that she is restarting    Orders:  -     pantoprazole (PROTONIX) 20 MG EC tablet; Take 2 tablets by mouth Daily.  Dispense: 30 tablet; Refill: 1    3. Bilateral lower extremity edema  Assessment & Plan:  She reports that her bilateral lower extremity edema is worse on the days that she stands 8 hours working at [employer].    She reports her bilateral lower extremity edema is present even on her days off but much improved.    She reports that she did try some Lasix but did not really improve it.    PCP has been following her labs and no evidence of kidney disease or thyroid disease that may be contributing to her edema.    Her last stress echo was May 29, 2024 and this was a normal stress echo low risk study no evidence of diastolic dysfunction    Plan:    She is advised to get bilateral lower extremity compression socks that have support on the foot and the calf.  She is advised compression socks on during the day and off at night.    She is advised low-sodium diet    She is advised to watch her diabetic control because diabetes out-of-control can result in bilateral lower extremity edema    She is advised to increase her water intake    She is advised to elevate her legs above the level of her heart 15 minutes 3 times a day    We will let her try these lifestyle modifications and see her back in about 3 months to see how things are going      4. Mixed hyperlipidemia  Assessment & Plan:     Total Cholesterol 172 143 149   Triglycerides 278 High  98  95   HDL Cholesterol 42 65 51   VLDL Cholesterol [place] 46 High  18 18   LDL Chol Calc (NI[facility]) 84 60 80       We discussed that her triglycerides are elevated.  We discussed that this has more to do with her diabetes and too many sugars and carbohydrates.  During this discussion I found out she is having a chocolate doughnut from [employer] almost every day.    She is advised to stop eating a chocolate doughnut nearly every day.  She is encouraged to have a doughnut once a week and to take her medications for diabetes and improve her diabetic diet.    Her total cholesterol and LDL are in excellent control.  She is on a statin and should continue statin.          Recommendations: Report if any new/changing symptoms immediately, Limit salt, Elevate legs, and Compression hose          Follow Up  Return in about 4 months (around 8/10/2025) for CAD/edema.  Patient was given instructions and counseling regarding her condition or for health maintenance advice. Please see specific information pulled into the AVS if appropriate.

## 2025-04-10 NOTE — ASSESSMENT & PLAN NOTE
Total Cholesterol 172 143 149   Triglycerides 278 High  98 95   HDL Cholesterol 42 65 51   VLDL Cholesterol Wilfrid 46 High  18 18   LDL Chol Calc ([facility]) 84 60 80       We discussed that her triglycerides are elevated.  We discussed that this has more to do with her diabetes and too many sugars and carbohydrates.  During this discussion I found out she is having a chocolate doughnut from [employer] almost every day.    She is advised to stop eating a chocolate doughnut nearly every day.  She is encouraged to have a doughnut once a week and to take her medications for diabetes and improve her diabetic diet.    Her total cholesterol and LDL are in excellent control.  She is on a statin and should continue statin.

## 2025-04-10 NOTE — ASSESSMENT & PLAN NOTE
Reports had been on Protonix 40 mg daily in the past but felt like she had received the full benefit and was off this medication.    Now she reports that she is having some reflux and this is described as acid in her esophagus with the burning sensation.    Plan:    Restart Protonix 20 mg    Check with pharmacist for best time of day to take this medication    Let her PCP know that she is restarting

## 2025-04-10 NOTE — ASSESSMENT & PLAN NOTE
She reports that her bilateral lower extremity edema is worse on the days that she stands 8 hours working at [employer].    She reports her bilateral lower extremity edema is present even on her days off but much improved.    She reports that she did try some Lasix but did not really improve it.    PCP has been following her labs and no evidence of kidney disease or thyroid disease that may be contributing to her edema.    Her last stress echo was May 29, 2024 and this was a normal stress echo low risk study no evidence of diastolic dysfunction    Plan:    She is advised to get bilateral lower extremity compression socks that have support on the foot and the calf.  She is advised compression socks on during the day and off at night.    She is advised low-sodium diet    She is advised to watch her diabetic control because diabetes out-of-control can result in bilateral lower extremity edema    She is advised to increase her water intake    She is advised to elevate her legs above the level of her heart 15 minutes 3 times a day    We will let her try these lifestyle modifications and see her back in about 3 months to see how things are going

## 2025-04-23 ENCOUNTER — TELEPHONE (OUTPATIENT)
Dept: FAMILY MEDICINE CLINIC | Facility: CLINIC | Age: 80
End: 2025-04-23
Payer: MEDICARE

## 2025-04-23 RX ORDER — CYCLOBENZAPRINE HCL 5 MG
5 TABLET ORAL 3 TIMES DAILY PRN
Qty: 15 TABLET | Refills: 0 | Status: SHIPPED | OUTPATIENT
Start: 2025-04-23

## 2025-04-23 NOTE — TELEPHONE ENCOUNTER
Prescription for cyclobenazprine sent to pharmacy. If symptoms persist will need to come in for further evalaution

## 2025-04-28 ENCOUNTER — TELEPHONE (OUTPATIENT)
Dept: FAMILY MEDICINE CLINIC | Facility: CLINIC | Age: 80
End: 2025-04-28
Payer: MEDICARE

## 2025-04-28 ENCOUNTER — READMISSION MANAGEMENT (OUTPATIENT)
Dept: CALL CENTER | Facility: HOSPITAL | Age: 80
End: 2025-04-28
Payer: MEDICARE

## 2025-04-28 RX ORDER — TOLTERODINE 4 MG/1
4 CAPSULE, EXTENDED RELEASE ORAL DAILY
Qty: 30 CAPSULE | Refills: 0 | Status: SHIPPED | OUTPATIENT
Start: 2025-04-28

## 2025-04-28 NOTE — TELEPHONE ENCOUNTER
Caller: Earline Santacruz    Relationship to patient: Self    Best call back number: [phone]     New or established patient?  [] New  [x] Established  Date of discharge:  04/26/2025    Facility discharged from: [facility]     Diagnosis/Symptoms: UTI     Length of stay (If applicable): 04/24/2025 - 04/26/2025    Specialty Only: Did you see a Religion health provider?    [] Yes  [x] No  If so, who? N/A    Additional Details: HAS A HOSPITAL F/U WITH PCP ON 05/01/2025

## 2025-04-28 NOTE — OUTREACH NOTE
Prep Survey      Flowsheet Row Responses   Confucian[facility] facility patient discharged from? Non-BH   Is LACE score < 7 ? Non-BH Discharge   Eligibility [facility]   Date of Admission 04/24/25   Date of Discharge 04/26/25   Discharge Disposition Home or Self Care   Discharge diagnosis UTI   Does the patient have one of the following disease processes/diagnoses(primary or secondary)? Other   Prep survey completed? Yes            Karen CERVANTES - Registered Nurse

## 2025-04-29 ENCOUNTER — TRANSITIONAL CARE MANAGEMENT TELEPHONE ENCOUNTER (OUTPATIENT)
Dept: CALL CENTER | Facility: HOSPITAL | Age: 80
End: 2025-04-29
Payer: MEDICARE

## 2025-04-29 NOTE — OUTREACH NOTE
Call Center TCM Note      Flowsheet Row Responses   [facility] [place] patient discharged from? Non-  [[facility]]   Does the patient have one of the following disease processes/diagnoses(primary or secondary)? Other   TCM attempt successful? Yes   Call start time 1309   Call end time 1311   Discharge diagnosis UTI   Person spoke with today (if not patient) and relationship Patient   Meds reviewed with patient/caregiver? Yes   Is the patient having any side effects they believe may be caused by any medication additions or changes? No   Does the patient have all medications ordered at discharge? Yes   Is the patient taking all medications as directed (includes completed medication regime)? Yes   [facility] May 1   Does the patient have an appointment with their PCP within 7-14 days of discharge? Yes   Psychosocial issues? No   Did the patient receive a copy of their discharge instructions? Yes   Nursing interventions Reviewed instructions with patient   What is the patient's perception of their health status since discharge? Improving   Is the patient/caregiver able to teach back signs and symptoms related to disease process for when to call PCP? Yes   Is the patient/caregiver able to teach back signs and symptoms related to disease process for when to call 911? Yes   Is the patient/caregiver able to teach back the hierarchy of who to call/visit for symptoms/problems? PCP, Specialist, Home health nurse, Urgent Care, ED, 911 Yes   If the patient is a current smoker, are they able to teach back resources for cessation? Not a smoker   TCM call completed? Yes   Wrap up additional comments Patient reports she is doing great. No needs at his time.   Call end time 1311   Would this patient benefit from a Referral to Amb Social Work? No   Is the patient interested in additional calls from an ambulatory ? Marta Shankar  Registered Nurse    4/29/2025, 13:11 EDT

## 2025-04-30 DIAGNOSIS — E11.40 TYPE 2 DIABETES MELLITUS WITH DIABETIC NEUROPATHY, WITHOUT LONG-TERM CURRENT USE OF INSULIN: ICD-10-CM

## 2025-04-30 RX ORDER — GABAPENTIN 300 MG/1
300 CAPSULE ORAL 3 TIMES DAILY
Qty: 90 CAPSULE | Refills: 0 | Status: SHIPPED | OUTPATIENT
Start: 2025-04-30

## 2025-05-01 ENCOUNTER — OFFICE VISIT (OUTPATIENT)
Dept: FAMILY MEDICINE CLINIC | Facility: CLINIC | Age: 80
End: 2025-05-01
Payer: MEDICARE

## 2025-05-01 VITALS
BODY MASS INDEX: 27.19 KG/M2 | OXYGEN SATURATION: 99 % | SYSTOLIC BLOOD PRESSURE: 112 MMHG | HEIGHT: 61 IN | RESPIRATION RATE: 18 BRPM | DIASTOLIC BLOOD PRESSURE: 78 MMHG | WEIGHT: 144 LBS | HEART RATE: 95 BPM | TEMPERATURE: 98.7 F

## 2025-05-01 DIAGNOSIS — N30.00 ACUTE CYSTITIS WITHOUT HEMATURIA: ICD-10-CM

## 2025-05-01 DIAGNOSIS — I10 PRIMARY HYPERTENSION: Chronic | ICD-10-CM

## 2025-05-01 DIAGNOSIS — E11.42 TYPE 2 DIABETES MELLITUS WITH DIABETIC POLYNEUROPATHY, WITHOUT LONG-TERM CURRENT USE OF INSULIN: ICD-10-CM

## 2025-05-01 DIAGNOSIS — G47.33 OBSTRUCTIVE SLEEP APNEA: Primary | ICD-10-CM

## 2025-05-01 DIAGNOSIS — N30.90 RECURRENT CYSTITIS: ICD-10-CM

## 2025-05-01 DIAGNOSIS — I25.10 CORONARY ARTERY DISEASE INVOLVING NATIVE CORONARY ARTERY OF NATIVE HEART WITHOUT ANGINA PECTORIS: ICD-10-CM

## 2025-05-01 RX ORDER — TIZANIDINE 2 MG/1
2 TABLET ORAL EVERY 6 HOURS PRN
Qty: 20 TABLET | Refills: 0 | Status: SHIPPED | OUTPATIENT
Start: 2025-05-01

## 2025-05-01 RX ORDER — TIZANIDINE HYDROCHLORIDE 2 MG/1
2 CAPSULE, GELATIN COATED ORAL 3 TIMES DAILY
Qty: 20 CAPSULE | Refills: 0 | Status: SHIPPED | OUTPATIENT
Start: 2025-05-01 | End: 2025-05-01

## 2025-05-01 NOTE — ASSESSMENT & PLAN NOTE
Patient had well-controlled glucose for a number of years until she discontinued her metformin, feeling as though it may be contributing to a episode of bowel incontinence, however turns out that was due to constipation. At my urging she did resume her metformin with improvement in her A1c, but stated when she resumed metformin she became very gassy and self discontinued. Patient had been taking glipizide XL 10 mg daily.  Her last visit approximately 4 weeks ago patient was initiated on Rybelsus 3 mg every morning.  Her A1c 4 weeks ago was 8.3%.  Patient states she does not feel that her Rybelsus is providing much benefit as she was requiring sliding scale insulin during her overnight admission for UTI.  Patient also states Rybelsus is financial burden with co-pay of $190.  Patient is agreeable to trial GLP-1 medication, either Ozempic or tirzepatide whichever is most affordable under her coverage plan.  She does not want to utilize her Rybelsus prior to initiation of GLP-1 given it is already been paid for.  Follow-up 3-month

## 2025-05-01 NOTE — ASSESSMENT & PLAN NOTE
She has history of frequent urinary tract infections, having been evaluated by urology, Dr. Vasques in the past. She was put on prophylaxis of memantine but has failed to be compliant stating it tastes terrible. On multiple occasions I had encouraged her to get back with Dr. Vasques due to increased frequency of her UTI issues over the last 6 months but she declined.  Patient was last referred again to Dr. Pfeiffer in August 2024 but failed to keep that appointment as well.  Discussed with her recent hospitalization we should get her back on board with urology to feel other options.

## 2025-05-01 NOTE — ASSESSMENT & PLAN NOTE
Patient has longstanding history of chronic cystitis, having been evaluated by urology. Patient was last seen by Dr. Vasques in 2021 at which time he put her on a regimen of preventative methenamine 1 g twice daily. Patient has failed to comply with that regimen and is noted to have urinary tract infections approximately every 6 months at this point.

## 2025-05-01 NOTE — ASSESSMENT & PLAN NOTE
Patient does not currently utilize any antihypertensive medications, blood pressure 112/78 in office today

## 2025-05-01 NOTE — PROGRESS NOTES
"    Office Note     Name: Earline Santacruz    : 1945     MRN: [id number]     Chief Complaint  Hospital Follow Up Visit    Subjective     History of Present Illness:  Earline Santacruz is a 80 y.o. female who presents today for ***    Review of Systems    Objective     Past Medical History:   Diagnosis Date   • Diabetes mellitus    • Hyperlipidemia    • Hypertension    • Panic attacks      Past Surgical History:   Procedure Laterality Date   • APPENDECTOMY     • BREAST LUMPECTOMY      RIGHT    • CATARACT EXTRACTION, BILATERAL     • HAND SURGERY      LEFT    • TOTAL ABDOMINAL HYSTERECTOMY       Family History   Problem Relation Age of Onset   • No Known Problems Mother    • No Known Problems Father        Vital Signs  /78 (BP Location: Left arm, Patient Position: Sitting, Cuff Size: Adult)   Pulse 95   Temp 98.7 °F (37.1 °C) (Temporal)   Resp 18   Ht 154.9 cm (61\")   Wt 65.3 kg (144 lb)   SpO2 99%   BMI 27.21 kg/m²   Estimated body mass index is 27.21 kg/m² as calculated from the following:    Height as of this encounter: 154.9 cm (61\").    Weight as of this encounter: 65.3 kg (144 lb).  Facility age limit for growth %edwardo is 20 years.    Physical Exam     {The following data was reviewed by (Optional):22493}  {Ambulatory Labs (Optional):11850}  {Data reviewed (Optional):32800:::1}     POCT Results (if applicable):  Results for orders placed or performed in visit on 25   Urine Drug Screen - Urine, Clean Catch    Collection Time: 25 10:05 AM    Specimen: Urine, Clean Catch    Urine  Release to mil   Result Value Ref Range    Amphetamine, Urine Qual Negative Vkgfdc=7054 ng/mL    Barbiturates Screen, Urine Negative Hsbrmx=837 ng/mL    Benzodiazepine Screen, Urine Positive (A) Dvupfa=966 ng/mL    THC Screen, Urine Negative Cutoff=20 ng/mL    Cocaine Screen, Urine Negative Tunvox=146 ng/mL    Opiate Screen, Urine Negative Rsoqhr=016 ng/mL    Oxycodone/Oxymorphone, Urine Negative " Fpmemr=138 ng/mL    Phencyclidine (PCP), Urine Negative Cutoff=25 ng/mL    Methadone Screen, Urine Negative Swfcul=537 ng/mL    Propoxyphene Screen Negative Jfanyy=081 ng/mL    Creatinine, Urine 122.3 20.0 - 300.0 mg/dL    pH, UA 5.5 4.5 - 8.9    Please note Comment    Hepatitis C Antibody    Collection Time: 03/21/25 10:05 AM    Specimen: Arm, Left; Blood    Blood  Release to mil   Result Value Ref Range    Hep C Virus Ab Non Reactive Non Reactive   Vitamin D,25-Hydroxy    Collection Time: 03/21/25 10:05 AM    Specimen: Arm, Left; Blood    Blood  Release to mil   Result Value Ref Range    25 Hydroxy, Vitamin D 21.4 (L) 30.0 - 100.0 ng/mL   TSH Rfx On Abnormal To Free T4    Collection Time: 03/21/25 10:05 AM    Specimen: Arm, Left; Blood    Blood  Release to mil   Result Value Ref Range    TSH 1.590 0.450 - 4.500 uIU/mL   Lipid Panel    Collection Time: 03/21/25 10:05 AM    Specimen: Arm, Left; Blood    Blood  Release to mil   Result Value Ref Range    Total Cholesterol 172 100 - 199 mg/dL    Triglycerides 278 (H) 0 - 149 mg/dL    HDL Cholesterol 42 >39 mg/dL    VLDL Cholesterol Wilfrid 46 (H) 5 - 40 mg/dL    LDL Chol Calc (NIH) 84 0 - 99 mg/dL   Hemoglobin A1c    Collection Time: 03/21/25 10:05 AM    Specimen: Arm, Left; Blood    Blood  Release to mil   Result Value Ref Range    Hemoglobin A1C 8.3 (H) 4.8 - 5.6 %   Comprehensive Metabolic Panel    Collection Time: 03/21/25 10:05 AM    Specimen: Arm, Left; Blood    Blood  Release to mil   Result Value Ref Range    Glucose 192 (H) 70 - 99 mg/dL    BUN 15 8 - 27 mg/dL    Creatinine 0.69 0.57 - 1.00 mg/dL    EGFR Result 88 >59 mL/min/1.73    BUN/Creatinine Ratio 22 12 - 28    Sodium 142 134 - 144 mmol/L    Potassium 4.1 3.5 - 5.2 mmol/L    Chloride 104 96 - 106 mmol/L    Total CO2 24 20 - 29 mmol/L    Calcium 9.4 8.7 - 10.3 mg/dL    Total Protein 6.4 6.0 - 8.5 g/dL    Albumin 4.4 3.8 - 4.8 g/dL    Globulin 2.0 1.5 - 4.5 g/dL    Total Bilirubin 0.3 0.0 - 1.2 mg/dL     Alkaline Phosphatase 68 44 - 121 IU/L    AST (SGOT) 20 0 - 40 IU/L    ALT (SGPT) 17 0 - 32 IU/L   CBC & Differential    Collection Time: 03/21/25 10:05 AM    Specimen: Arm, Left; Blood    Blood  Release to mil   Result Value Ref Range    WBC 8.2 3.4 - 10.8 x10E3/uL    RBC 4.71 3.77 - 5.28 x10E6/uL    Hemoglobin 15.2 11.1 - 15.9 g/dL    Hematocrit 45.3 34.0 - 46.6 %    MCV 96 79 - 97 fL    MCH 32.3 26.6 - 33.0 pg    MCHC 33.6 31.5 - 35.7 g/dL    RDW 12.4 11.7 - 15.4 %    Platelets 298 150 - 450 x10E3/uL    Neutrophil Rel % 44 Not Estab. %    Lymphocyte Rel % 45 Not Estab. %    Monocyte Rel % 6 Not Estab. %    Eosinophil Rel % 4 Not Estab. %    Basophil Rel % 1 Not Estab. %    Neutrophils Absolute 3.6 1.4 - 7.0 x10E3/uL    Lymphocytes Absolute 3.7 (H) 0.7 - 3.1 x10E3/uL    Monocytes Absolute 0.5 0.1 - 0.9 x10E3/uL    Eosinophils Absolute 0.3 0.0 - 0.4 x10E3/uL    Basophils Absolute 0.1 0.0 - 0.2 x10E3/uL    Immature Granulocyte Rel % 0 Not Estab. %    Immature Grans Absolute 0.0 0.0 - 0.1 x10E3/uL            Assessment and Plan     Diagnoses and all orders for this visit:    1. Obstructive sleep apnea (Primary)    2. Type 2 diabetes mellitus with diabetic polyneuropathy, without long-term current use of insulin           {Time Spent (Optional):55090}    Vaccine Counseling:  {RBWIMMCOUNSEL (Optional):75918}    Advanced Care Planning:   {Advance Directive Status:08203}    Smoking Cessation:   {time:09115}    Follow Up  No follow-ups on file.    Nimo Hinds, APRN

## 2025-05-01 NOTE — ASSESSMENT & PLAN NOTE
She has a known history of having small vessel coronary artery disease.  Her disease was in a vessel that was too small for intervention.    She completed a stress echo 5/29/2024 and this is consistent with a low risk study for myocardial ischemia.   She denies any chest pain or shortness of breath.     She is on medical management including and we will   -Aspirin 81 mg daily  -Statin

## 2025-05-01 NOTE — PROGRESS NOTES
Transitional Care Follow Up Visit  Subjective     Earline Santacruz is a 80 y.o. female who presents for a transitional care management visit.    Within 48 business hours after discharge our office contacted her via telephone to coordinate her care and needs.      I reviewed and discussed the details of that call along with the discharge summary, hospital problems, inpatient lab results, inpatient diagnostic studies, and consultation reports with Earline.     Current outpatient and discharge medications have been reconciled for the patient.  Reviewed by: Nimo Hinds, NESHA          4/28/2025     2:57 PM   Date of TCM Phone Call   [facility]   Date of Admission 4/24/2025   Date of Discharge 4/26/2025   Discharge Disposition Home or Self Care     Risk for Readmission (LACE) No data recorded    History of Present Illness   Course During Hospital Stay: Patient presents today for transitional care follow-up after admission to [facility] April 25 through April 26 for urethritis and cystitis.  Patient denies that she had any urinary symptoms, despite longstanding pattern of recurrent cystitis over the last couple of years.  Patient states she became very confused, prompting her granddaughter to take her to the emergency department.  Patient was ultimately diagnosed with the above-mentioned diagnoses.  She was treated with IV antibiotics, sent home with oral dose of cefdinir which she completed today.  Patient also followed for chronic conditions of type 2 diabetes, hypertension.  Blood pressure very well-controlled in office today, 112/78.  Regards to her type 2 diabetes, Patient had well-controlled glucose for a number of years until she discontinued her metformin, feeling as though it may be contributing to a episode of bowel incontinence, however turns out that was due to constipation. At my urging she did resume her metformin with improvement in her A1c, but stated when she  "resumed metformin she became very gassy and self discontinued. Patient had been taking glipizide XL 10 mg daily.  Her last visit approximately 4 weeks ago patient was initiated on Rybelsus 3 mg every morning.  Her A1c 4 weeks ago was 8.3%.  Patient states she does not feel that her Rybelsus is providing much benefit as she was requiring sliding scale insulin during her overnight admission for UTI.  Patient also states Rybelsus is financial burden with co-pay of $190.  She has no further complaints or concern     The following portions of the patient's history were reviewed and updated as appropriate: allergies, current medications, past family history, past medical history, past social history, past surgical history, and problem list.    Review of Systems   Constitutional:  Positive for fatigue. Negative for chills and fever.   Respiratory:  Negative for cough, chest tightness, shortness of breath and wheezing.    Cardiovascular:  Negative for chest pain, palpitations and leg swelling.   Gastrointestinal:  Negative for abdominal pain, constipation, diarrhea, nausea and vomiting.   Endocrine: Negative for polydipsia and polyuria.   Genitourinary:  Negative for difficulty urinating, flank pain, frequency, hematuria, pelvic pain and urgency.   Musculoskeletal:  Positive for back pain.   Neurological:  Negative for dizziness, weakness, light-headedness and headaches.   Psychiatric/Behavioral:  Negative for agitation, self-injury, sleep disturbance and suicidal ideas. The patient is not nervous/anxious.        Objective   /78 (BP Location: Left arm, Patient Position: Sitting, Cuff Size: Adult)   Pulse 95   Temp 98.7 °F (37.1 °C) (Temporal)   Resp 18   Ht 154.9 cm (61\")   Wt 65.3 kg (144 lb)   SpO2 99%   BMI 27.21 kg/m²   Physical Exam  Vitals reviewed.   Constitutional:       Appearance: Normal appearance.   HENT:      Head: Normocephalic and atraumatic.      Right Ear: Tympanic membrane, ear canal and " external ear normal.      Left Ear: Tympanic membrane, ear canal and external ear normal.      Mouth/Throat:      Mouth: Mucous membranes are moist.      Pharynx: Oropharynx is clear.   Eyes:      Conjunctiva/sclera: Conjunctivae normal.      Pupils: Pupils are equal, round, and reactive to light.   Cardiovascular:      Rate and Rhythm: Normal rate and regular rhythm.      Pulses: Normal pulses.      Heart sounds: Normal heart sounds.   Pulmonary:      Effort: Pulmonary effort is normal.      Breath sounds: Normal breath sounds.   Abdominal:      General: Bowel sounds are normal. There is no distension.      Palpations: Abdomen is soft.      Tenderness: There is no abdominal tenderness. There is no right CVA tenderness, left CVA tenderness or guarding.   Musculoskeletal:      Cervical back: Neck supple.   Skin:     General: Skin is warm and dry.   Neurological:      Mental Status: She is alert and oriented to person, place, and time.         Assessment & Plan   Diagnoses and all orders for this visit:    1. Obstructive sleep apnea (Primary)  Assessment & Plan:  Patient's diagnosis was mild, she does not utilize CPAP therapy       2. Type 2 diabetes mellitus with diabetic polyneuropathy, without long-term current use of insulin  Assessment & Plan:  Patient had well-controlled glucose for a number of years until she discontinued her metformin, feeling as though it may be contributing to a episode of bowel incontinence, however turns out that was due to constipation. At my urging she did resume her metformin with improvement in her A1c, but stated when she resumed metformin she became very gassy and self discontinued. Patient had been taking glipizide XL 10 mg daily.  Her last visit approximately 4 weeks ago patient was initiated on Rybelsus 3 mg every morning.  Her A1c 4 weeks ago was 8.3%.  Patient states she does not feel that her Rybelsus is providing much benefit as she was requiring sliding scale insulin during  her overnight admission for UTI.  Patient also states Rybelsus is financial burden with co-pay of $190.  Patient is agreeable to trial GLP-1 medication, either Ozempic or tirzepatide whichever is most affordable under her coverage plan.  She does not want to utilize her Rybelsus prior to initiation of GLP-1 given it is already been paid for.  Follow-up 3-month    Orders:  -     Tirzepatide 2.5 MG/0.5ML solution auto-injector; Inject 2.5 mg under the skin into the appropriate area as directed 1 (One) Time Per Week.  Dispense: 0.5 mL; Refill: 2    3. Acute cystitis without hematuria  Assessment & Plan:  Patient has longstanding history of chronic cystitis, having been evaluated by urology. Patient was last seen by Dr. Vasques in 2021 at which time he put her on a regimen of preventative methenamine 1 g twice daily. Patient has failed to comply with that regimen and is noted to have urinary tract infections approximately every 6 months at this point.       4. Coronary artery disease involving native coronary artery of native heart without angina pectoris  Assessment & Plan:  She has a known history of having small vessel coronary artery disease.  Her disease was in a vessel that was too small for intervention.    She completed a stress echo 5/29/2024 and this is consistent with a low risk study for myocardial ischemia.   She denies any chest pain or shortness of breath.     She is on medical management including and we will   -Aspirin 81 mg daily  -Statin      5. Primary hypertension  Assessment & Plan:  Patient does not currently utilize any antihypertensive medications, blood pressure 112/78 in office today      6. Recurrent cystitis  Assessment & Plan:  She has history of frequent urinary tract infections, having been evaluated by urology, Dr. Vasques in the past. She was put on prophylaxis of memantine but has failed to be compliant stating it tastes terrible. On multiple occasions I had encouraged her to get back  with Dr. Vasques due to increased frequency of her UTI issues over the last 6 months but she declined.  Patient was last referred again to Dr. Pfeiffer in August 2024 but failed to keep that appointment as well.  Discussed with her recent hospitalization we should get her back on board with urology to feel other options.      Other orders  -     Discontinue: TiZANidine (ZANAFLEX) 2 MG capsule; Take 1 capsule by mouth 3 (Three) Times a Day.  Dispense: 20 capsule; Refill: 0  -     tiZANidine (ZANAFLEX) 2 MG tablet; Take 1 tablet by mouth Every 6 (Six) Hours As Needed for Muscle Spasms.  Dispense: 20 tablet; Refill: 0

## 2025-05-02 DIAGNOSIS — F41.9 ANXIETY AND DEPRESSION: ICD-10-CM

## 2025-05-02 DIAGNOSIS — F32.A ANXIETY AND DEPRESSION: ICD-10-CM

## 2025-05-02 RX ORDER — ALPRAZOLAM 0.5 MG
0.5 TABLET ORAL 2 TIMES DAILY PRN
Qty: 60 TABLET | Refills: 0 | Status: SHIPPED | OUTPATIENT
Start: 2025-05-02

## 2025-05-02 RX ORDER — LEVOTHYROXINE SODIUM 50 UG/1
50 TABLET ORAL DAILY
Qty: 90 TABLET | Refills: 0 | Status: SHIPPED | OUTPATIENT
Start: 2025-05-02

## 2025-05-07 ENCOUNTER — CLINICAL SUPPORT (OUTPATIENT)
Dept: FAMILY MEDICINE CLINIC | Facility: CLINIC | Age: 80
End: 2025-05-07
Payer: MEDICARE

## 2025-05-07 DIAGNOSIS — M54.50 LUMBAR BACK PAIN: Primary | ICD-10-CM

## 2025-05-07 DIAGNOSIS — M25.559 HIP PAIN, UNSPECIFIED LATERALITY: Primary | ICD-10-CM

## 2025-05-07 PROCEDURE — 96372 THER/PROPH/DIAG INJ SC/IM: CPT | Performed by: NURSE PRACTITIONER

## 2025-05-07 RX ORDER — METHYLPREDNISOLONE ACETATE 80 MG/ML
80 INJECTION, SUSPENSION INTRA-ARTICULAR; INTRALESIONAL; INTRAMUSCULAR; SOFT TISSUE ONCE
Status: COMPLETED | OUTPATIENT
Start: 2025-05-07 | End: 2025-05-07

## 2025-05-07 RX ADMIN — METHYLPREDNISOLONE ACETATE 80 MG: 80 INJECTION, SUSPENSION INTRA-ARTICULAR; INTRALESIONAL; INTRAMUSCULAR; SOFT TISSUE at 11:57

## 2025-05-07 NOTE — PROGRESS NOTES
I have administered her shot today in our office. She has tolerated this well and has waited the 15 minutes with no reaction. TF

## 2025-05-21 ENCOUNTER — OFFICE VISIT (OUTPATIENT)
Dept: FAMILY MEDICINE CLINIC | Facility: CLINIC | Age: 80
End: 2025-05-21
Payer: MEDICARE

## 2025-05-21 VITALS
OXYGEN SATURATION: 97 % | SYSTOLIC BLOOD PRESSURE: 122 MMHG | RESPIRATION RATE: 16 BRPM | HEIGHT: 61 IN | TEMPERATURE: 97.3 F | HEART RATE: 90 BPM | DIASTOLIC BLOOD PRESSURE: 76 MMHG | WEIGHT: 144 LBS | BODY MASS INDEX: 27.19 KG/M2

## 2025-05-21 DIAGNOSIS — I10 PRIMARY HYPERTENSION: Chronic | ICD-10-CM

## 2025-05-21 DIAGNOSIS — F41.9 ANXIETY AND DEPRESSION: Primary | ICD-10-CM

## 2025-05-21 DIAGNOSIS — E11.42 TYPE 2 DIABETES MELLITUS WITH DIABETIC POLYNEUROPATHY, WITHOUT LONG-TERM CURRENT USE OF INSULIN: ICD-10-CM

## 2025-05-21 DIAGNOSIS — F32.A ANXIETY AND DEPRESSION: Primary | ICD-10-CM

## 2025-05-21 LAB
EXPIRATION DATE: NORMAL
Lab: NORMAL
POC ALBUMIN, URINE: 10 MG/L
POC CREATININE, URINE: 10 MG/DL
POC URINE ALB/CREA RATIO: <30

## 2025-05-21 NOTE — ASSESSMENT & PLAN NOTE
Patient had well-controlled glucose for a number of years until she discontinued her metformin, feeling as though it may be contributing to a episode of bowel incontinence, however turns out that was due to constipation. At my urging she did resume her metformin with improvement in her A1c, but stated when she resumed metformin she became very gassy and self discontinued yet again. Patient had been taking glipizide XL 10 mg daily only, however that did not continue troll her glucose adequately.  Subsequently she was initiated on Rybelsus every morning.  Patient has also been intermittently checking her glucose at home feeling that the Rybelsus was not providing much benefit.  Rybelsus has also been a financial burden with a $190 co-pay.  During her last visit patient was agreeable to trial GLP-1 injection.  Patient was prescribed tirzepatide 2.5 mg weekly, however was told by the pharmacy that would be a $1000 co-pay which was clearly a financial burden.  - Will attempt and see if semaglutide is a cheaper option, she has been given a sample of semaglutide to be taken at 0.25 injection weekly dosing.  Will initiate that dose given she has no adverse side effects and it provides better insurance coverage.  She will also continue her glipizide 10 mg daily.  -Follow-up in 3 months

## 2025-05-21 NOTE — PROGRESS NOTES
Office Note     Name: Earline Santacruz    : 1945     MRN: [id number]     Chief Complaint  follow up medicine     Subjective     History of Present Illness:  Earline Santacruz is a 80 y.o. female who presents today for follow-up after changing her diabetes medications 4 weeks ago.  Patient follow-up for other chronic conditions including hypertension, anxiety and depression.  Her blood pressure is very well-controlled in office today, 122/76. Patient had well-controlled glucose for a number of years until she discontinued her metformin, feeling as though it may be contributing to a episode of bowel incontinence, however turns out that was due to constipation. At my urging she did resume her metformin with improvement in her A1c, but stated when she resumed metformin she became very gassy and self discontinued yet again. Patient had been taking glipizide XL 10 mg daily only, however that did not continue troll her glucose adequately.  Subsequently she was initiated on Rybelsus every morning.  Patient has also been intermittently checking her glucose at home feeling that the Rybelsus was not providing much benefit.  Rybelsus has also been a financial burden with a $190 co-pay.  During her last visit patient was agreeable to trial GLP-1 injection.  Patient was prescribed tirzepatide 2.5 mg weekly, however was told by the pharmacy that would be a $1000 co-pay which was clearly a financial burden.  She has no further complaints or concerns  Review of Systems   Constitutional:  Negative for chills, fatigue and fever.   Gastrointestinal:  Negative for abdominal pain, constipation, diarrhea, nausea and vomiting.   Genitourinary:  Positive for urinary incontinence.   Neurological:  Negative for dizziness, weakness, light-headedness, numbness and headache.       Objective     Past Medical History:   Diagnosis Date    Diabetes mellitus     Hyperlipidemia     Hypertension     Panic attacks      Past Surgical  "History:   Procedure Laterality Date    APPENDECTOMY      BREAST LUMPECTOMY      RIGHT     CATARACT EXTRACTION, BILATERAL      HAND SURGERY      LEFT     TOTAL ABDOMINAL HYSTERECTOMY       Family History   Problem Relation Age of Onset    No Known Problems Mother     No Known Problems Father        Vital Signs  /76 (BP Location: Left arm, Patient Position: Sitting, Cuff Size: Adult)   Pulse 90   Temp 97.3 °F (36.3 °C) (Temporal)   Resp 16   Ht 154.9 cm (61\")   Wt 65.3 kg (144 lb)   SpO2 97%   BMI 27.21 kg/m²   Estimated body mass index is 27.21 kg/m² as calculated from the following:    Height as of this encounter: 154.9 cm (61\").    Weight as of this encounter: 65.3 kg (144 lb).  Facility age limit for growth %edwardo is 20 years.    Physical Exam  Vitals reviewed.   Constitutional:       Appearance: Normal appearance.   Cardiovascular:      Rate and Rhythm: Normal rate and regular rhythm.      Pulses: Normal pulses.      Heart sounds: Normal heart sounds.   Pulmonary:      Effort: Pulmonary effort is normal.      Breath sounds: Normal breath sounds.   Abdominal:      General: Bowel sounds are normal.      Palpations: Abdomen is soft.   Musculoskeletal:      Cervical back: Neck supple.   Feet:      Right foot:      Protective Sensation: 6 sites tested.  6 sites sensed.      Left foot:      Protective Sensation: 6 sites tested.  6 sites sensed.   Skin:     General: Skin is warm and dry.   Neurological:      Mental Status: She is alert and oriented to person, place, and time.          POCT Results (if applicable):  Results for orders placed or performed in visit on 05/21/25   POC Albumin/Creatinine Ratio Urine    Collection Time: 05/21/25 10:28 AM    Specimen: Urine   Result Value Ref Range    POC ALBUMIN, URINE 10 mg/L    POC CREATININE, URINE 10 mg/dL    POC Urine Albumin Creatinine Ratio <30 <30    Lot Number [id number]     Expiration Date 10,302,026             Assessment and Plan     Diagnoses and " all orders for this visit:    1. Anxiety and depression (Primary)  Assessment & Plan:  Patient continues to love her job as a  at [employer].  She states this has been very good for her mental health, not really struggling with any anxiety or depression currently. She has discontinued her antidepressant therapy. She has a longstanding pattern of anxiety with intermittent panic for which she has utilized alprazolam 0.5 mg twice daily as needed. Trev report reviewed and satisfactory. Will continue current very sparing dose of alprazolam for panic as needed       2. Type 2 diabetes mellitus with diabetic polyneuropathy, without long-term current use of insulin  Assessment & Plan:   Patient had well-controlled glucose for a number of years until she discontinued her metformin, feeling as though it may be contributing to a episode of bowel incontinence, however turns out that was due to constipation. At my urging she did resume her metformin with improvement in her A1c, but stated when she resumed metformin she became very gassy and self discontinued yet again. Patient had been taking glipizide XL 10 mg daily only, however that did not continue troll her glucose adequately.  Subsequently she was initiated on Rybelsus every morning.  Patient has also been intermittently checking her glucose at home feeling that the Rybelsus was not providing much benefit.  Rybelsus has also been a financial burden with a $190 co-pay.  During her last visit patient was agreeable to trial GLP-1 injection.  Patient was prescribed tirzepatide 2.5 mg weekly, however was told by the pharmacy that would be a $1000 co-pay which was clearly a financial burden.  - Will attempt and see if semaglutide is a cheaper option, she has been given a sample of semaglutide to be taken at 0.25 injection weekly dosing.  Will initiate that dose given she has no adverse side effects and it provides better insurance coverage.  She will also continue her  glipizide 10 mg daily.  -Follow-up in 3 months    Orders:  -     Semaglutide,0.25 or 0.5MG/DOS, (OZEMPIC) 2 MG/1.5ML solution pen-injector; Inject 0.25 mg under the skin into the appropriate area as directed 1 (One) Time Per Week.  Dispense: 1.5 mL; Refill: 2  -     POC Albumin/Creatinine Ratio Urine    3. Primary hypertension  Assessment & Plan:  Blood pressure very well-controlled in office today, 120/76.  Patient does not currently utilize antihypertensive agents             Follow Up  No follow-ups on file.    Nimo Hinds, APRN

## 2025-05-23 NOTE — ASSESSMENT & PLAN NOTE
Blood pressure very well-controlled in office today, 120/76.  Patient does not currently utilize antihypertensive agents

## 2025-05-28 DIAGNOSIS — E11.42 TYPE 2 DIABETES MELLITUS WITH DIABETIC POLYNEUROPATHY, WITHOUT LONG-TERM CURRENT USE OF INSULIN: ICD-10-CM

## 2025-05-28 RX ORDER — GLIPIZIDE 10 MG/1
10 TABLET, FILM COATED, EXTENDED RELEASE ORAL DAILY
Qty: 30 TABLET | Refills: 0 | Status: SHIPPED | OUTPATIENT
Start: 2025-05-28

## 2025-05-28 RX ORDER — TOLTERODINE 4 MG/1
4 CAPSULE, EXTENDED RELEASE ORAL DAILY
Qty: 30 CAPSULE | Refills: 0 | Status: SHIPPED | OUTPATIENT
Start: 2025-05-28

## 2025-06-06 ENCOUNTER — OFFICE VISIT (OUTPATIENT)
Dept: FAMILY MEDICINE CLINIC | Facility: CLINIC | Age: 80
End: 2025-06-06
Payer: MEDICARE

## 2025-06-06 VITALS
SYSTOLIC BLOOD PRESSURE: 122 MMHG | WEIGHT: 144 LBS | OXYGEN SATURATION: 97 % | DIASTOLIC BLOOD PRESSURE: 78 MMHG | HEART RATE: 87 BPM | HEIGHT: 61 IN | RESPIRATION RATE: 18 BRPM | BODY MASS INDEX: 27.19 KG/M2 | TEMPERATURE: 98.7 F

## 2025-06-06 DIAGNOSIS — E11.42 TYPE 2 DIABETES MELLITUS WITH DIABETIC POLYNEUROPATHY, WITHOUT LONG-TERM CURRENT USE OF INSULIN: Primary | ICD-10-CM

## 2025-06-06 DIAGNOSIS — I10 PRIMARY HYPERTENSION: Chronic | ICD-10-CM

## 2025-06-06 DIAGNOSIS — K59.09 CHRONIC CONSTIPATION: ICD-10-CM

## 2025-06-06 NOTE — PROGRESS NOTES
Office Note     Name: Earline Santacruz    : 1945     MRN: [id number]     Chief Complaint  Constipation    Subjective     History of Present Illness:  Earline Santacruz is a 80 y.o. female who presents today for evaluation of constipation.  Patient has has longterm issues with constipation throughout the years, however has worsened since initiation of semaglutide for her type 2 diabetes patient endorses utilization of MiraLAX and prunes without any improvement in her constipation.  She denies any abdominal pain, bloating, cramping, rectal bleeding.  Patient states that she could do better with fiber intake as well as water consumption.  Her blood pressure exhibiting excellent control in office today, 122/78.  Since starting semaglutide she endorses improvement in glucose, her most elevated reading being in the 120 range. Patient had well-controlled glucose for a number of years until she discontinued her metformin, feeling as though it may be contributing to a episode of bowel incontinence, however turns out that was due to constipation. At my urging she did resume her metformin with improvement in her A1c, but stated when she resumed metformin she became very gassy and self discontinued yet again. Patient had been taking glipizide XL 10 mg daily only, however that did not continue troll her glucose adequately. Subsequently she was initiated on Rybelsus every morning. Patient has also been intermittently checking her glucose at home feeling that the Rybelsus was not providing much benefit. Rybelsus has also been a financial burden with a $190 co-pay. During her last visit patient was agreeable to trial GLP-1 injection. Patient was prescribed tirzepatide 2.5 mg weekly, however was told by the pharmacy that would be a $1000 co-pay which was clearly a financial burden.  Subsequently we were able to provide patient with samples for semaglutide 0.25 mg weekly .  Review of Systems   Constitutional:   "Negative for chills, fatigue and fever.   Respiratory:  Negative for cough and shortness of breath.    Cardiovascular:  Negative for chest pain and leg swelling.   Gastrointestinal:  Positive for constipation. Negative for abdominal distention, abdominal pain, blood in stool, diarrhea, nausea, vomiting and GERD.   Neurological:  Negative for dizziness, weakness, light-headedness and headache.       Objective     Past Medical History:   Diagnosis Date    Diabetes mellitus     Hyperlipidemia     Hypertension     Panic attacks      Past Surgical History:   Procedure Laterality Date    APPENDECTOMY      BREAST LUMPECTOMY      RIGHT     CATARACT EXTRACTION, BILATERAL      HAND SURGERY      LEFT     TOTAL ABDOMINAL HYSTERECTOMY       Family History   Problem Relation Age of Onset    No Known Problems Mother     No Known Problems Father        Vital Signs  /78 (BP Location: Left arm, Patient Position: Sitting, Cuff Size: Adult)   Pulse 87   Temp 98.7 °F (37.1 °C) (Temporal)   Resp 18   Ht 154.9 cm (61\")   Wt 65.3 kg (144 lb)   SpO2 97%   BMI 27.21 kg/m²   Estimated body mass index is 27.21 kg/m² as calculated from the following:    Height as of this encounter: 154.9 cm (61\").    Weight as of this encounter: 65.3 kg (144 lb).  Facility age limit for growth %edwardo is 20 years.    Physical Exam  Vitals reviewed.   Constitutional:       Appearance: Normal appearance.   Cardiovascular:      Rate and Rhythm: Normal rate and regular rhythm.      Pulses: Normal pulses.      Heart sounds: Normal heart sounds.   Pulmonary:      Effort: Pulmonary effort is normal.      Breath sounds: Normal breath sounds.   Abdominal:      General: Bowel sounds are normal. There is no distension.      Palpations: Abdomen is soft.      Tenderness: There is no abdominal tenderness. There is no guarding or rebound.      Hernia: No hernia is present.   Musculoskeletal:      Cervical back: Neck supple.   Skin:     General: Skin is warm and " dry.   Neurological:      Mental Status: She is alert and oriented to person, place, and time.          POCT Results (if applicable):  Results for orders placed or performed in visit on 05/21/25   POC Albumin/Creatinine Ratio Urine    Collection Time: 05/21/25 10:28 AM    Specimen: Urine   Result Value Ref Range    POC ALBUMIN, URINE 10 mg/L    POC CREATININE, URINE 10 mg/dL    POC Urine Albumin Creatinine Ratio <30 <30    Lot Number [id number]     Expiration Date 10,302,026             Assessment and Plan     Diagnoses and all orders for this visit:    1. Type 2 diabetes mellitus with diabetic polyneuropathy, without long-term current use of insulin (Primary)  Assessment & Plan:  Since starting semaglutide she endorses improvement in glucose, her most elevated reading being in the 120 range. Patient had well-controlled glucose for a number of years until she discontinued her metformin, feeling as though it may be contributing to a episode of bowel incontinence, however turns out that was due to constipation. At my urging she did resume her metformin with improvement in her A1c, but stated when she resumed metformin she became very gassy and self discontinued yet again. Patient had been taking glipizide XL 10 mg daily only, however that did not continue troll her glucose adequately. Subsequently she was initiated on Rybelsus every morning. Patient has also been intermittently checking her glucose at home feeling that the Rybelsus was not providing much benefit. Rybelsus has also been a financial burden with a $190 co-pay. During her last visit patient was agreeable to trial GLP-1 injection. Patient was prescribed tirzepatide 2.5 mg weekly, however was told by the pharmacy that would be a $1000 co-pay which was clearly a financial burden.  Subsequently we were able to provide patient with samples for semaglutide 0.25 mg weekly       2. Primary hypertension  Assessment & Plan:  Blood pressure in office today,  122/78.  Currently require any antihypertensive agents.      3. Chronic constipation  Assessment & Plan:  Patient has has longterm issues with constipation throughout the years, however has worsened since initiation of semaglutide for her type 2 diabetes patient endorses utilization of MiraLAX and prunes without any improvement in her constipation.  She denies any abdominal pain, bloating, cramping, rectal bleeding.  Patient states that she could do better with fiber intake as well as water consumption.    - Advised to obtain magnesium citrate, utilizing half a bottle with first dose.  If no improvement towards bowel movement within the first 6 hours may finish bottle.  Advised to continue daily MiraLAX and increase water consumption with goal of 60 to 100 ounces daily             Follow Up  Return if symptoms worsen or fail to improve.    Nimo Hinds, APRN

## 2025-06-12 DIAGNOSIS — E11.40 TYPE 2 DIABETES MELLITUS WITH DIABETIC NEUROPATHY, WITHOUT LONG-TERM CURRENT USE OF INSULIN: ICD-10-CM

## 2025-06-12 RX ORDER — GABAPENTIN 300 MG/1
300 CAPSULE ORAL 3 TIMES DAILY
Qty: 90 CAPSULE | Refills: 0 | Status: SHIPPED | OUTPATIENT
Start: 2025-06-12 | End: 2025-06-16 | Stop reason: SDUPTHER

## 2025-06-12 NOTE — ASSESSMENT & PLAN NOTE
Since starting semaglutide she endorses improvement in glucose, her most elevated reading being in the 120 range. Patient had well-controlled glucose for a number of years until she discontinued her metformin, feeling as though it may be contributing to a episode of bowel incontinence, however turns out that was due to constipation. At my urging she did resume her metformin with improvement in her A1c, but stated when she resumed metformin she became very gassy and self discontinued yet again. Patient had been taking glipizide XL 10 mg daily only, however that did not continue troll her glucose adequately. Subsequently she was initiated on Rybelsus every morning. Patient has also been intermittently checking her glucose at home feeling that the Rybelsus was not providing much benefit. Rybelsus has also been a financial burden with a $190 co-pay. During her last visit patient was agreeable to trial GLP-1 injection. Patient was prescribed tirzepatide 2.5 mg weekly, however was told by the pharmacy that would be a $1000 co-pay which was clearly a financial burden.  Subsequently we were able to provide patient with samples for semaglutide 0.25 mg weekly

## 2025-06-12 NOTE — ASSESSMENT & PLAN NOTE
Patient has has longterm issues with constipation throughout the years, however has worsened since initiation of semaglutide for her type 2 diabetes patient endorses utilization of MiraLAX and prunes without any improvement in her constipation.  She denies any abdominal pain, bloating, cramping, rectal bleeding.  Patient states that she could do better with fiber intake as well as water consumption.    - Advised to obtain magnesium citrate, utilizing half a bottle with first dose.  If no improvement towards bowel movement within the first 6 hours may finish bottle.  Advised to continue daily MiraLAX and increase water consumption with goal of 60 to 100 ounces daily

## 2025-06-16 DIAGNOSIS — E11.40 TYPE 2 DIABETES MELLITUS WITH DIABETIC NEUROPATHY, WITHOUT LONG-TERM CURRENT USE OF INSULIN: ICD-10-CM

## 2025-06-16 RX ORDER — GABAPENTIN 300 MG/1
300 CAPSULE ORAL 3 TIMES DAILY
Qty: 90 CAPSULE | Refills: 0 | OUTPATIENT
Start: 2025-06-16 | End: 2025-07-16

## 2025-06-16 RX ORDER — GABAPENTIN 300 MG/1
300 CAPSULE ORAL 3 TIMES DAILY
Qty: 90 CAPSULE | Refills: 0 | Status: SHIPPED | OUTPATIENT
Start: 2025-06-16

## 2025-06-26 DIAGNOSIS — E11.42 TYPE 2 DIABETES MELLITUS WITH DIABETIC POLYNEUROPATHY, WITHOUT LONG-TERM CURRENT USE OF INSULIN: ICD-10-CM

## 2025-06-26 RX ORDER — TOLTERODINE 4 MG/1
4 CAPSULE, EXTENDED RELEASE ORAL DAILY
Qty: 30 CAPSULE | Refills: 0 | Status: SHIPPED | OUTPATIENT
Start: 2025-06-26

## 2025-06-26 RX ORDER — GLIPIZIDE 10 MG/1
10 TABLET, FILM COATED, EXTENDED RELEASE ORAL DAILY
Qty: 30 TABLET | Refills: 0 | Status: SHIPPED | OUTPATIENT
Start: 2025-06-26

## 2025-06-30 ENCOUNTER — TELEPHONE (OUTPATIENT)
Dept: FAMILY MEDICINE CLINIC | Facility: CLINIC | Age: 80
End: 2025-06-30
Payer: MEDICARE

## 2025-06-30 NOTE — TELEPHONE ENCOUNTER
Caller: Earline Santacruz    Relationship: Self    Best call back number: [phone]     What medication are you requesting: ANTIBIOTIC    What are your current symptoms: CHRONIC UTI'S. IS HAVING FREQUENCY.    How long have you been experiencing symptoms: TODAY    Have you had these symptoms before:    [x] Yes  [] No    Have you been treated for these symptoms before:   [x] Yes  [] No    If a prescription is needed, what is your preferred pharmacy and phone number: [address] [phone] [facility] [phone]      Additional notes: IS WORKING ALL DAY TODAY AND TOMORROW SO CAN'T COME IN UNTIL WEDNESDAY.  PLEASE CALL TO ADVISE IF NEEDS TO BE SEEN.

## 2025-07-01 NOTE — TELEPHONE ENCOUNTER
I just received this message this morning and I have spoke with Earline and have let her know that she will need an appt. She states that with her working she has made an appt for the urgent care. TF

## 2025-07-02 DIAGNOSIS — F41.9 ANXIETY AND DEPRESSION: ICD-10-CM

## 2025-07-02 DIAGNOSIS — F32.A ANXIETY AND DEPRESSION: ICD-10-CM

## 2025-07-03 RX ORDER — ALPRAZOLAM 0.5 MG
0.5 TABLET ORAL 2 TIMES DAILY PRN
Qty: 60 TABLET | Refills: 0 | Status: SHIPPED | OUTPATIENT
Start: 2025-07-03

## 2025-07-03 RX ORDER — ALPRAZOLAM 0.5 MG
0.5 TABLET ORAL 2 TIMES DAILY PRN
Qty: 60 TABLET | Refills: 0 | OUTPATIENT
Start: 2025-07-03

## 2025-07-07 DIAGNOSIS — E11.42 TYPE 2 DIABETES MELLITUS WITH DIABETIC POLYNEUROPATHY, WITHOUT LONG-TERM CURRENT USE OF INSULIN: ICD-10-CM

## 2025-07-07 RX ORDER — GLIPIZIDE 10 MG/1
10 TABLET, FILM COATED, EXTENDED RELEASE ORAL DAILY
Qty: 30 TABLET | Refills: 0 | Status: SHIPPED | OUTPATIENT
Start: 2025-07-07

## 2025-07-28 RX ORDER — TOLTERODINE 4 MG/1
4 CAPSULE, EXTENDED RELEASE ORAL DAILY
Qty: 30 CAPSULE | Refills: 0 | Status: SHIPPED | OUTPATIENT
Start: 2025-07-28

## 2025-07-28 RX ORDER — LEVOTHYROXINE SODIUM 50 UG/1
50 TABLET ORAL DAILY
Qty: 90 TABLET | Refills: 0 | Status: SHIPPED | OUTPATIENT
Start: 2025-07-28

## 2025-08-05 RX ORDER — TOLTERODINE 4 MG/1
4 CAPSULE, EXTENDED RELEASE ORAL DAILY
Qty: 30 CAPSULE | Refills: 0 | OUTPATIENT
Start: 2025-08-05

## 2025-08-05 RX ORDER — LEVOTHYROXINE SODIUM 50 UG/1
50 TABLET ORAL DAILY
Qty: 90 TABLET | Refills: 0 | OUTPATIENT
Start: 2025-08-05

## 2025-08-07 ENCOUNTER — OFFICE VISIT (OUTPATIENT)
Dept: FAMILY MEDICINE CLINIC | Facility: CLINIC | Age: 80
End: 2025-08-07
Payer: MEDICARE

## 2025-08-07 VITALS
WEIGHT: 141 LBS | TEMPERATURE: 97.9 F | HEIGHT: 61 IN | SYSTOLIC BLOOD PRESSURE: 118 MMHG | RESPIRATION RATE: 16 BRPM | BODY MASS INDEX: 26.62 KG/M2 | DIASTOLIC BLOOD PRESSURE: 82 MMHG | HEART RATE: 96 BPM | OXYGEN SATURATION: 98 %

## 2025-08-07 DIAGNOSIS — R30.0 DYSURIA: ICD-10-CM

## 2025-08-07 DIAGNOSIS — E11.42 TYPE 2 DIABETES MELLITUS WITH DIABETIC POLYNEUROPATHY, WITHOUT LONG-TERM CURRENT USE OF INSULIN: ICD-10-CM

## 2025-08-07 DIAGNOSIS — N30.90 RECURRENT CYSTITIS: Primary | ICD-10-CM

## 2025-08-07 RX ORDER — METHENAMINE HIPPURATE 1000 MG/1
1 TABLET ORAL 2 TIMES DAILY WITH MEALS
Qty: 180 TABLET | Refills: 2 | Status: SHIPPED | OUTPATIENT
Start: 2025-08-07 | End: 2025-11-05

## 2025-08-07 RX ORDER — CIPROFLOXACIN 500 MG/1
500 TABLET, FILM COATED ORAL 2 TIMES DAILY
Qty: 10 TABLET | Refills: 0 | Status: SHIPPED | OUTPATIENT
Start: 2025-08-07

## 2025-08-08 DIAGNOSIS — E11.42 TYPE 2 DIABETES MELLITUS WITH DIABETIC POLYNEUROPATHY, WITHOUT LONG-TERM CURRENT USE OF INSULIN: ICD-10-CM

## 2025-08-15 DIAGNOSIS — F41.9 ANXIETY AND DEPRESSION: ICD-10-CM

## 2025-08-15 DIAGNOSIS — F32.A ANXIETY AND DEPRESSION: ICD-10-CM

## 2025-08-15 DIAGNOSIS — E11.40 TYPE 2 DIABETES MELLITUS WITH DIABETIC NEUROPATHY, WITHOUT LONG-TERM CURRENT USE OF INSULIN: ICD-10-CM

## 2025-08-15 RX ORDER — GABAPENTIN 300 MG/1
300 CAPSULE ORAL 3 TIMES DAILY
Qty: 90 CAPSULE | Refills: 0 | Status: SHIPPED | OUTPATIENT
Start: 2025-08-15

## 2025-08-15 RX ORDER — ALPRAZOLAM 0.5 MG
0.5 TABLET ORAL 2 TIMES DAILY PRN
Qty: 60 TABLET | Refills: 0 | Status: SHIPPED | OUTPATIENT
Start: 2025-08-15

## 2025-08-21 ENCOUNTER — OFFICE VISIT (OUTPATIENT)
Dept: FAMILY MEDICINE CLINIC | Facility: CLINIC | Age: 80
End: 2025-08-21
Payer: MEDICARE

## 2025-08-21 VITALS
OXYGEN SATURATION: 97 % | HEART RATE: 74 BPM | SYSTOLIC BLOOD PRESSURE: 116 MMHG | WEIGHT: 141 LBS | RESPIRATION RATE: 16 BRPM | BODY MASS INDEX: 26.62 KG/M2 | HEIGHT: 61 IN | DIASTOLIC BLOOD PRESSURE: 70 MMHG | TEMPERATURE: 98.3 F

## 2025-08-21 DIAGNOSIS — L25.9 CONTACT DERMATITIS, UNSPECIFIED CONTACT DERMATITIS TYPE, UNSPECIFIED TRIGGER: ICD-10-CM

## 2025-08-21 DIAGNOSIS — B37.2 YEAST DERMATITIS: Primary | ICD-10-CM

## 2025-08-21 RX ORDER — TRIAMCINOLONE ACETONIDE 1 MG/G
1 CREAM TOPICAL 2 TIMES DAILY
Qty: 80 G | Refills: 1 | Status: SHIPPED | OUTPATIENT
Start: 2025-08-21

## 2025-08-21 RX ORDER — METHYLPREDNISOLONE 4 MG/1
TABLET ORAL
Qty: 21 TABLET | Refills: 0 | Status: SHIPPED | OUTPATIENT
Start: 2025-08-21

## 2025-08-21 RX ORDER — NYSTATIN 100000 U/G
1 CREAM TOPICAL 2 TIMES DAILY
Qty: 30 G | Refills: 1 | Status: SHIPPED | OUTPATIENT
Start: 2025-08-21

## 2025-08-28 PROBLEM — L25.9 CONTACT DERMATITIS: Status: ACTIVE | Noted: 2025-08-28
